# Patient Record
Sex: MALE | Race: WHITE | Employment: FULL TIME | URBAN - METROPOLITAN AREA
[De-identification: names, ages, dates, MRNs, and addresses within clinical notes are randomized per-mention and may not be internally consistent; named-entity substitution may affect disease eponyms.]

---

## 2017-01-09 ENCOUNTER — OFFICE VISIT (OUTPATIENT)
Dept: FAMILY MEDICINE CLINIC | Facility: CLINIC | Age: 52
End: 2017-01-09

## 2017-01-09 ENCOUNTER — HOSPITAL ENCOUNTER (OUTPATIENT)
Dept: CT IMAGING | Facility: HOSPITAL | Age: 52
Discharge: HOME | End: 2017-01-09
Attending: INTERNAL MEDICINE

## 2017-01-09 VITALS
WEIGHT: 286.25 LBS | HEIGHT: 72 IN | OXYGEN SATURATION: 98 % | RESPIRATION RATE: 16 BRPM | HEART RATE: 87 BPM | DIASTOLIC BLOOD PRESSURE: 90 MMHG | SYSTOLIC BLOOD PRESSURE: 122 MMHG | TEMPERATURE: 97 F | BODY MASS INDEX: 38.77 KG/M2

## 2017-01-09 DIAGNOSIS — R07.9 CHEST PAIN, UNSPECIFIED TYPE: ICD-10-CM

## 2017-01-09 DIAGNOSIS — E66.09 NON MORBID OBESITY DUE TO EXCESS CALORIES: ICD-10-CM

## 2017-01-09 DIAGNOSIS — I10 ESSENTIAL HYPERTENSION: Primary | ICD-10-CM

## 2017-01-09 DIAGNOSIS — Z13.9 SCREENING: ICD-10-CM

## 2017-01-09 PROCEDURE — 99214 OFFICE O/P EST MOD 30 MIN: CPT | Performed by: FAMILY MEDICINE

## 2017-01-09 RX ORDER — LISINOPRIL 40 MG/1
40 TABLET ORAL DAILY
Qty: 30 TABLET | Refills: 3 | Status: SHIPPED | OUTPATIENT
Start: 2017-01-09 | End: 2017-02-08 | Stop reason: WASHOUT

## 2017-01-09 NOTE — PROGRESS NOTES
Jacek Ross is a 54year old male. HPI:   Joesph Figueredo is here for follow up after he was hosptialized for chest pain last month, had TCM appt then here for recheck on condition and BP, his BP at work emily been 604-092 and diastolic  99-98.  He feels better a clubbing or edema    ASSESSMENT AND PLAN:   Essential hypertension  (primary encounter diagnosis)  Non morbid obesity due to excess calories  Chest pain, unspecified type    Can double up  The lisinopril to 40 mg daily, and RTC in 1 month to rechceck BP ca

## 2017-01-11 ENCOUNTER — TELEPHONE (OUTPATIENT)
Dept: FAMILY MEDICINE CLINIC | Facility: CLINIC | Age: 52
End: 2017-01-11

## 2017-01-11 NOTE — TELEPHONE ENCOUNTER
CT Heart Score received via fax from [facility]. Written order from Dr. J Luis Gramajo: Can notify negative, which is great. Pt notified by phone and verbalized understanding.

## 2017-02-06 ENCOUNTER — OFFICE VISIT (OUTPATIENT)
Dept: FAMILY MEDICINE CLINIC | Facility: CLINIC | Age: 52
End: 2017-02-06

## 2017-02-06 VITALS
RESPIRATION RATE: 16 BRPM | WEIGHT: 287 LBS | HEART RATE: 88 BPM | BODY MASS INDEX: 39 KG/M2 | SYSTOLIC BLOOD PRESSURE: 120 MMHG | DIASTOLIC BLOOD PRESSURE: 79 MMHG | TEMPERATURE: 97 F

## 2017-02-06 DIAGNOSIS — J30.89 SEASONAL ALLERGIC RHINITIS DUE TO OTHER ALLERGIC TRIGGER: ICD-10-CM

## 2017-02-06 DIAGNOSIS — I10 ESSENTIAL HYPERTENSION: Primary | ICD-10-CM

## 2017-02-06 DIAGNOSIS — E66.9 OBESITY (BMI 35.0-39.9 WITHOUT COMORBIDITY): ICD-10-CM

## 2017-02-06 DIAGNOSIS — Z77.090 ASBESTOS EXPOSURE: ICD-10-CM

## 2017-02-06 PROCEDURE — 99214 OFFICE O/P EST MOD 30 MIN: CPT | Performed by: FAMILY MEDICINE

## 2017-02-06 NOTE — PROGRESS NOTES
Thompson Harvey is a 54year old male. HPI:   Patient presents for recheck of his hypertension. Pt has been taking medications as instructed, no medication side effects, home BP monitoring in the range of 132-509'O systolic and 42-45'G diastolic.     Wt Alcohol Use: Yes           0.0 oz/week       0 Standard drinks or equivalent per week       Comment: rare        REVIEW OF SYSTEMS:   GENERAL HEALTH: feels well otherwise  SKIN: denies any unusual skin lesions or rashes  RESPIRATORY: d

## 2017-05-08 ENCOUNTER — OFFICE VISIT (OUTPATIENT)
Dept: FAMILY MEDICINE CLINIC | Facility: CLINIC | Age: 52
End: 2017-05-08

## 2017-05-08 VITALS
SYSTOLIC BLOOD PRESSURE: 130 MMHG | DIASTOLIC BLOOD PRESSURE: 90 MMHG | RESPIRATION RATE: 16 BRPM | HEART RATE: 96 BPM | BODY MASS INDEX: 39 KG/M2 | TEMPERATURE: 98 F | WEIGHT: 288.63 LBS

## 2017-05-08 DIAGNOSIS — J30.89 SEASONAL ALLERGIC RHINITIS DUE TO OTHER ALLERGIC TRIGGER: ICD-10-CM

## 2017-05-08 DIAGNOSIS — I10 ESSENTIAL HYPERTENSION: Primary | ICD-10-CM

## 2017-05-08 DIAGNOSIS — Z00.00 ROUTINE HEALTH MAINTENANCE: ICD-10-CM

## 2017-05-08 DIAGNOSIS — E66.9 OBESITY (BMI 35.0-39.9 WITHOUT COMORBIDITY): ICD-10-CM

## 2017-05-08 PROCEDURE — 99214 OFFICE O/P EST MOD 30 MIN: CPT | Performed by: FAMILY MEDICINE

## 2017-05-08 RX ORDER — LISINOPRIL 40 MG/1
TABLET ORAL
Refills: 3 | COMMUNITY
Start: 2017-04-12 | End: 2017-05-08

## 2017-05-08 RX ORDER — LISINOPRIL 40 MG/1
TABLET ORAL
Qty: 90 TABLET | Refills: 3 | Status: SHIPPED | OUTPATIENT
Start: 2017-05-08 | End: 2018-05-05

## 2017-05-08 NOTE — PROGRESS NOTES
Anibal Mcdermott is a 50year old male. HPI:   Patient presents for recheck of his hypertension.  Pt has been taking medications as instructed, no medication side effects, home BP monitoring in the range of 437-618'V systolic and 76-49'I diastolic.he den Disp: 23 g Rfl: 0   Albuterol Sulfate HFA (PROAIR HFA) 108 (90 BASE) MCG/ACT Inhalation Aero Soln Inhale 2 puffs into the lungs every 6 (six) hours as needed for Wheezing.  Disp: 1 Inhaler Rfl: 0      Past Medical History   Diagnosis Date   • Kidney stones

## 2017-05-10 RX ORDER — LISINOPRIL 40 MG/1
TABLET ORAL
Qty: 30 TABLET | Refills: 3 | OUTPATIENT
Start: 2017-05-10

## 2017-11-08 ENCOUNTER — OFFICE VISIT (OUTPATIENT)
Dept: FAMILY MEDICINE CLINIC | Facility: CLINIC | Age: 52
End: 2017-11-08

## 2017-11-08 VITALS
HEART RATE: 70 BPM | TEMPERATURE: 98 F | SYSTOLIC BLOOD PRESSURE: 138 MMHG | DIASTOLIC BLOOD PRESSURE: 82 MMHG | OXYGEN SATURATION: 98 %

## 2017-11-08 DIAGNOSIS — J01.10 ACUTE NON-RECURRENT FRONTAL SINUSITIS: Primary | ICD-10-CM

## 2017-11-08 PROCEDURE — 99213 OFFICE O/P EST LOW 20 MIN: CPT | Performed by: PHYSICIAN ASSISTANT

## 2017-11-08 RX ORDER — AMOXICILLIN AND CLAVULANATE POTASSIUM 875; 125 MG/1; MG/1
1 TABLET, FILM COATED ORAL 2 TIMES DAILY
Qty: 20 TABLET | Refills: 0 | Status: SHIPPED | OUTPATIENT
Start: 2017-11-08 | End: 2017-11-18

## 2017-11-08 NOTE — PATIENT INSTRUCTIONS
1. Augmentin 875 mg twice daily for 10 days. 2.  Continue Flonase.     3.  Encourage fluids, humidifier/vaporizor at bedside, elevate head of bed (sleep with extra pillow), vapor rub to chest, steam therapy if no fever, warm compresses for sinus pressure · Over-the-counter decongestants may be used unless a similar medicine was prescribed. Nasal sprays work the fastest. Use one that contains phenylephrine or oxymetazoline. First blow the nose gently. Then use the spray.  Do not use these medicines more ofte © 0388-3736 [employer] [address] [facility], [facility] [place]. All rights reserved. This information is not intended as a substitute for professional medical care. Always follow your healthcare professional's instructions.

## 2017-11-08 NOTE — PROGRESS NOTES
CHIEF COMPLAINT:   No chief complaint on file. HPI:   Pk Mate is a 50year old male who presents for cold symptoms for  2  weeks. Symptoms have progressed into sinus congestion and been worsening since onset.  Sinus congestion/pain is described SKIN: no rashes or abnormal skin lesions  HEENT: See HPI. LUNGS: denies shortness of breath or wheezing, See HPI  CARDIOVASCULAR: denies chest pain or palpitations   GI: denies N/V/C or abdominal pain  NEURO: + sinus headaches.   No numbness or tingling 3.  Encourage fluids, humidifier/vaporizor at bedside, elevate head of bed (sleep with extra pillow), vapor rub to chest, steam therapy if no fever, warm compresses for sinus pressure if no fever, salt water gargles for sore throat, lozenges for sore throa · Over-the-counter decongestants may be used unless a similar medicine was prescribed. Nasal sprays work the fastest. Use one that contains phenylephrine or oxymetazoline. First blow the nose gently. Then use the spray.  Do not use these medicines more ofte © 2733-8951 [employer] [address]. [address] [facility], [facility] [place]. All rights reserved. This information is not intended as a substitute for professional medical care. Always follow your healthcare professional's instructions.             The

## 2018-02-11 ENCOUNTER — HOSPITAL ENCOUNTER (EMERGENCY)
Facility: HOSPITAL | Age: 53
Discharge: HOME | End: 2018-02-11
Attending: EMERGENCY MEDICINE
Payer: COMMERCIAL

## 2018-02-11 ENCOUNTER — APPOINTMENT (OUTPATIENT)
Dept: CT IMAGING | Facility: HOSPITAL | Age: 53
End: 2018-02-11
Attending: EMERGENCY MEDICINE
Payer: COMMERCIAL

## 2018-02-11 VITALS
RESPIRATION RATE: 14 BRPM | SYSTOLIC BLOOD PRESSURE: 127 MMHG | DIASTOLIC BLOOD PRESSURE: 76 MMHG | HEART RATE: 93 BPM | WEIGHT: 280 LBS | HEIGHT: 72 IN | OXYGEN SATURATION: 98 % | BODY MASS INDEX: 37.93 KG/M2 | TEMPERATURE: 99 F

## 2018-02-11 DIAGNOSIS — S76.211A GROIN STRAIN, RIGHT, INITIAL ENCOUNTER: Primary | ICD-10-CM

## 2018-02-11 LAB
ALBUMIN SERPL-MCNC: 3.7 G/DL (ref 3.5–4.8)
ALP LIVER SERPL-CCNC: 60 U/L (ref 45–117)
ALT SERPL-CCNC: 37 U/L (ref 17–63)
AST SERPL-CCNC: 23 U/L (ref 15–41)
BASOPHILS # BLD AUTO: 0.02 X10(3) UL (ref 0–0.1)
BASOPHILS NFR BLD AUTO: 0.3 %
BILIRUB SERPL-MCNC: 0.5 MG/DL (ref 0.1–2)
BILIRUB UR QL STRIP.AUTO: NEGATIVE
BUN BLD-MCNC: 19 MG/DL (ref 8–20)
CALCIUM BLD-MCNC: 9.4 MG/DL (ref 8.3–10.3)
CHLORIDE: 107 MMOL/L (ref 101–111)
CLARITY UR REFRACT.AUTO: CLEAR
CO2: 23 MMOL/L (ref 22–32)
COLOR UR AUTO: YELLOW
CREAT BLD-MCNC: 0.88 MG/DL (ref 0.7–1.3)
EOSINOPHIL # BLD AUTO: 0.09 X10(3) UL (ref 0–0.3)
EOSINOPHIL NFR BLD AUTO: 1.5 %
ERYTHROCYTE [DISTWIDTH] IN BLOOD BY AUTOMATED COUNT: 12.1 % (ref 11.5–16)
GLUCOSE BLD-MCNC: 110 MG/DL (ref 70–99)
GLUCOSE UR STRIP.AUTO-MCNC: NEGATIVE MG/DL
HCT VFR BLD AUTO: 44.1 % (ref 37–53)
HGB BLD-MCNC: 14.7 G/DL (ref 13–17)
IMMATURE GRANULOCYTE COUNT: 0.01 X10(3) UL (ref 0–1)
IMMATURE GRANULOCYTE RATIO %: 0.2 %
KETONES UR STRIP.AUTO-MCNC: NEGATIVE MG/DL
LEUKOCYTE ESTERASE UR QL STRIP.AUTO: NEGATIVE
LYMPHOCYTES # BLD AUTO: 1.87 X10(3) UL (ref 0.9–4)
LYMPHOCYTES NFR BLD AUTO: 30.3 %
M PROTEIN MFR SERPL ELPH: 7.4 G/DL (ref 6.1–8.3)
MCH RBC QN AUTO: 30.2 PG (ref 27–33.2)
MCHC RBC AUTO-ENTMCNC: 33.3 G/DL (ref 31–37)
MCV RBC AUTO: 90.7 FL (ref 80–99)
MONOCYTES # BLD AUTO: 0.46 X10(3) UL (ref 0.1–1)
MONOCYTES NFR BLD AUTO: 7.4 %
NEUTROPHIL ABS PRELIM: 3.73 X10 (3) UL (ref 1.3–6.7)
NEUTROPHILS # BLD AUTO: 3.73 X10(3) UL (ref 1.3–6.7)
NEUTROPHILS NFR BLD AUTO: 60.3 %
NITRITE UR QL STRIP.AUTO: NEGATIVE
PH UR STRIP.AUTO: 5 [PH] (ref 4.5–8)
PLATELET # BLD AUTO: 221 10(3)UL (ref 150–450)
POTASSIUM SERPL-SCNC: 4.1 MMOL/L (ref 3.6–5.1)
PROT UR STRIP.AUTO-MCNC: NEGATIVE MG/DL
RBC # BLD AUTO: 4.86 X10(6)UL (ref 4.3–5.7)
RBC UR QL AUTO: NEGATIVE
RED CELL DISTRIBUTION WIDTH-SD: 40.3 FL (ref 35.1–46.3)
SODIUM SERPL-SCNC: 138 MMOL/L (ref 136–144)
SP GR UR STRIP.AUTO: 1.02 (ref 1–1.03)
UROBILINOGEN UR STRIP.AUTO-MCNC: <2 MG/DL
WBC # BLD AUTO: 6.2 X10(3) UL (ref 4–13)

## 2018-02-11 PROCEDURE — 99284 EMERGENCY DEPT VISIT MOD MDM: CPT

## 2018-02-11 PROCEDURE — 36415 COLL VENOUS BLD VENIPUNCTURE: CPT

## 2018-02-11 PROCEDURE — 80053 COMPREHEN METABOLIC PANEL: CPT | Performed by: EMERGENCY MEDICINE

## 2018-02-11 PROCEDURE — 81003 URINALYSIS AUTO W/O SCOPE: CPT | Performed by: EMERGENCY MEDICINE

## 2018-02-11 PROCEDURE — 85025 COMPLETE CBC W/AUTO DIFF WBC: CPT | Performed by: EMERGENCY MEDICINE

## 2018-02-11 PROCEDURE — 74176 CT ABD & PELVIS W/O CONTRAST: CPT | Performed by: EMERGENCY MEDICINE

## 2018-02-11 NOTE — ED PROVIDER NOTES
Patient Seen in: [facility] Emergency Department    History   Patient presents with:  Abdomen/Flank Pain (GI/)    Stated Complaint: Abd pain     HPI    31-year-old male complaining of right lower quadrant abdominal pain the patient states this sta exam shows regular rate and rhythm without murmurs.   Abdomen soft with moderate right lower quadrant tenderness as slightly below McBurney's point just above the pubic bone there is no palpable masses no rebound the scrotum testicle penis appear normal is

## 2018-02-11 NOTE — ED INITIAL ASSESSMENT (HPI)
Pt here with R groin and low abdominal pain radiating around to back, +nausea and dizzy, pt reports pain after shoveling snow yesterday.  Hx kidney stones

## 2018-03-05 ENCOUNTER — OFFICE VISIT (OUTPATIENT)
Dept: FAMILY MEDICINE CLINIC | Facility: CLINIC | Age: 53
End: 2018-03-05

## 2018-03-05 ENCOUNTER — HOSPITAL ENCOUNTER (OUTPATIENT)
Dept: GENERAL RADIOLOGY | Age: 53
Discharge: HOME | End: 2018-03-05
Attending: FAMILY MEDICINE
Payer: COMMERCIAL

## 2018-03-05 VITALS
TEMPERATURE: 98 F | DIASTOLIC BLOOD PRESSURE: 84 MMHG | RESPIRATION RATE: 16 BRPM | WEIGHT: 289.81 LBS | HEIGHT: 72.5 IN | SYSTOLIC BLOOD PRESSURE: 128 MMHG | HEART RATE: 94 BPM | BODY MASS INDEX: 38.83 KG/M2

## 2018-03-05 DIAGNOSIS — M54.42 BILATERAL LOW BACK PAIN WITH BILATERAL SCIATICA, UNSPECIFIED CHRONICITY: Primary | ICD-10-CM

## 2018-03-05 DIAGNOSIS — R20.2 PARESTHESIA OF BOTH LOWER EXTREMITIES: ICD-10-CM

## 2018-03-05 DIAGNOSIS — J30.89 SEASONAL ALLERGIC RHINITIS DUE TO OTHER ALLERGIC TRIGGER: ICD-10-CM

## 2018-03-05 DIAGNOSIS — M54.42 BILATERAL LOW BACK PAIN WITH BILATERAL SCIATICA, UNSPECIFIED CHRONICITY: ICD-10-CM

## 2018-03-05 DIAGNOSIS — M54.41 BILATERAL LOW BACK PAIN WITH BILATERAL SCIATICA, UNSPECIFIED CHRONICITY: Primary | ICD-10-CM

## 2018-03-05 DIAGNOSIS — M54.41 BILATERAL LOW BACK PAIN WITH BILATERAL SCIATICA, UNSPECIFIED CHRONICITY: ICD-10-CM

## 2018-03-05 PROCEDURE — 72110 X-RAY EXAM L-2 SPINE 4/>VWS: CPT | Performed by: FAMILY MEDICINE

## 2018-03-05 PROCEDURE — 99214 OFFICE O/P EST MOD 30 MIN: CPT | Performed by: FAMILY MEDICINE

## 2018-03-05 RX ORDER — METAXALONE 800 MG/1
TABLET ORAL
Qty: 15 TABLET | Refills: 0 | Status: SHIPPED | OUTPATIENT
Start: 2018-03-05 | End: 2018-04-04

## 2018-03-05 RX ORDER — PREDNISONE 10 MG/1
TABLET ORAL
Qty: 18 TABLET | Refills: 0 | Status: SHIPPED | OUTPATIENT
Start: 2018-03-05 | End: 2018-04-04

## 2018-03-05 NOTE — PROGRESS NOTES
Ninoska Tay is a 48year old male.   HPI:   Davin Hardwick is here for probems with his lower back, that he initially developed several months ago,  He cannot recall the mechanism of injury, he was not able to work due to the [employer] [address] , and fortunately he did not hav denies any unusual skin lesions or rashes  RESPIRATORY: denies shortness of breath with exertion  CARDIOVASCULAR: denies chest pain on exertion  GI: denies abdominal pain and denies heartburn  NEURO: denies headaches, paresthesia in both LE, low back pain

## 2018-03-07 ENCOUNTER — TELEPHONE (OUTPATIENT)
Dept: FAMILY MEDICINE CLINIC | Facility: CLINIC | Age: 53
End: 2018-03-07

## 2018-03-07 RX ORDER — LORAZEPAM 0.5 MG/1
TABLET ORAL
Qty: 2 TABLET | Refills: 0 | Status: SHIPPED | OUTPATIENT
Start: 2018-03-07 | End: 2018-04-04

## 2018-03-07 NOTE — TELEPHONE ENCOUNTER
Well I can;t make him unconscious, but I can call something in that might relax him for the test, in which case someone should drive him

## 2018-03-07 NOTE — TELEPHONE ENCOUNTER
Pt is having an MRI on Monday and is wondering if he can get something to sedate him.    Please return call to [phone]

## 2018-03-09 ENCOUNTER — OFFICE VISIT (OUTPATIENT)
Dept: FAMILY MEDICINE CLINIC | Facility: CLINIC | Age: 53
End: 2018-03-09

## 2018-03-09 VITALS
DIASTOLIC BLOOD PRESSURE: 86 MMHG | RESPIRATION RATE: 14 BRPM | TEMPERATURE: 99 F | BODY MASS INDEX: 39 KG/M2 | WEIGHT: 291 LBS | HEART RATE: 96 BPM | OXYGEN SATURATION: 98 % | SYSTOLIC BLOOD PRESSURE: 142 MMHG

## 2018-03-09 DIAGNOSIS — R20.2 PARESTHESIA OF BOTH LOWER EXTREMITIES: ICD-10-CM

## 2018-03-09 DIAGNOSIS — N45.1 EPIDIDYMITIS: Primary | ICD-10-CM

## 2018-03-09 DIAGNOSIS — M54.42 ACUTE BILATERAL LOW BACK PAIN WITH BILATERAL SCIATICA: ICD-10-CM

## 2018-03-09 DIAGNOSIS — M54.41 ACUTE BILATERAL LOW BACK PAIN WITH BILATERAL SCIATICA: ICD-10-CM

## 2018-03-09 DIAGNOSIS — M54.16 LUMBAR RADICULOPATHY: ICD-10-CM

## 2018-03-09 PROCEDURE — 99214 OFFICE O/P EST MOD 30 MIN: CPT | Performed by: FAMILY MEDICINE

## 2018-03-09 RX ORDER — SULFAMETHOXAZOLE AND TRIMETHOPRIM 800; 160 MG/1; MG/1
1 TABLET ORAL 2 TIMES DAILY
Qty: 20 TABLET | Refills: 0 | Status: SHIPPED | OUTPATIENT
Start: 2018-03-09 | End: 2018-04-04

## 2018-03-09 RX ORDER — TRAMADOL HYDROCHLORIDE 50 MG/1
50 TABLET ORAL EVERY 6 HOURS PRN
Qty: 30 TABLET | Refills: 0 | Status: SHIPPED | OUTPATIENT
Start: 2018-03-09 | End: 2018-04-04

## 2018-03-09 NOTE — PROGRESS NOTES
Anibal Mcdermott is a 48year old male.   HPI:   Waqas Hancock is here for probems with his lower back, that he initially developed several months ago,  He cannot recall the mechanism of injury, he was not able to work due to the [employer] , and fortunately he did not hav Comment: rare       REVIEW OF SYSTEMS:   GENERAL HEALTH: feels well otherwise  SKIN: denies any unusual skin lesions or rashes  RESPIRATORY: denies shortness of breath with exertion  CARDIOVASCULAR: denies chest pain on exertion  GI: denies abdominal pain

## 2018-03-12 ENCOUNTER — HOSPITAL ENCOUNTER (OUTPATIENT)
Dept: MRI IMAGING | Facility: HOSPITAL | Age: 53
Discharge: HOME | End: 2018-03-12
Attending: FAMILY MEDICINE
Payer: COMMERCIAL

## 2018-03-12 DIAGNOSIS — R20.2 PARESTHESIA OF BOTH LOWER EXTREMITIES: ICD-10-CM

## 2018-03-12 DIAGNOSIS — M54.41 BILATERAL LOW BACK PAIN WITH BILATERAL SCIATICA, UNSPECIFIED CHRONICITY: ICD-10-CM

## 2018-03-12 DIAGNOSIS — M54.42 BILATERAL LOW BACK PAIN WITH BILATERAL SCIATICA, UNSPECIFIED CHRONICITY: ICD-10-CM

## 2018-03-12 PROCEDURE — 72148 MRI LUMBAR SPINE W/O DYE: CPT | Performed by: FAMILY MEDICINE

## 2018-03-13 ENCOUNTER — TELEPHONE (OUTPATIENT)
Dept: FAMILY MEDICINE CLINIC | Facility: CLINIC | Age: 53
End: 2018-03-13

## 2018-03-13 DIAGNOSIS — R20.2 PARESTHESIA OF BOTH LOWER EXTREMITIES: ICD-10-CM

## 2018-03-13 DIAGNOSIS — M54.42 ACUTE BILATERAL LOW BACK PAIN WITH BILATERAL SCIATICA: Primary | ICD-10-CM

## 2018-03-13 DIAGNOSIS — M54.41 ACUTE BILATERAL LOW BACK PAIN WITH BILATERAL SCIATICA: Primary | ICD-10-CM

## 2018-03-13 NOTE — TELEPHONE ENCOUNTER
----- Message from Liz Conklin DO sent at 3/13/2018 10:14 AM CDT -----  Can notify Chuy Colunga that his MRI shows some wear and tear changes, but no big disc herniation or compression of the spinal cord, I think he might benefit from some PT to help with his b

## 2018-03-13 NOTE — TELEPHONE ENCOUNTER
Pt advised of results- verbalized understanding. Pt stated he has PT through his work- he would just need a copy of the order. He requested that we mail it to him.   I advised mail does have to go through the hospital and can take over a week- pt was okay

## 2018-03-15 ENCOUNTER — TELEPHONE (OUTPATIENT)
Dept: FAMILY MEDICINE CLINIC | Facility: CLINIC | Age: 53
End: 2018-03-15

## 2018-03-15 DIAGNOSIS — N50.0 ATROPHIC TESTICLE: Primary | ICD-10-CM

## 2018-03-15 NOTE — TELEPHONE ENCOUNTER
Per Dr. Gerson Chris pt is to see Dr. Yari Chavez [phone]    Referral placed and authorized.     Tried to call pt at number provided and received busy signal- will try back

## 2018-03-26 ENCOUNTER — TELEPHONE (OUTPATIENT)
Dept: FAMILY MEDICINE CLINIC | Facility: CLINIC | Age: 53
End: 2018-03-26

## 2018-03-26 ENCOUNTER — MED REC SCAN ONLY (OUTPATIENT)
Dept: FAMILY MEDICINE CLINIC | Facility: CLINIC | Age: 53
End: 2018-03-26

## 2018-03-26 DIAGNOSIS — Z12.5 PROSTATE CANCER SCREENING: ICD-10-CM

## 2018-03-26 DIAGNOSIS — I10 ESSENTIAL HYPERTENSION: Primary | ICD-10-CM

## 2018-03-26 DIAGNOSIS — Z13.220 LIPID SCREENING: ICD-10-CM

## 2018-03-26 NOTE — TELEPHONE ENCOUNTER
Pt requesting labs for upcoming apptment for physical exam DOS 4/4/17. Pt. Would like to review labs at his physical apptment. Although pt is new to [facility], he is an established pt who has [facility] insurance and changing PCP to Dr. Coty De Leon.   Pt is requesting an

## 2018-03-28 ENCOUNTER — APPOINTMENT (OUTPATIENT)
Dept: LAB | Age: 53
End: 2018-03-28
Attending: FAMILY MEDICINE
Payer: COMMERCIAL

## 2018-03-28 DIAGNOSIS — Z13.220 LIPID SCREENING: ICD-10-CM

## 2018-03-28 DIAGNOSIS — I10 ESSENTIAL HYPERTENSION: ICD-10-CM

## 2018-03-28 DIAGNOSIS — Z12.5 PROSTATE CANCER SCREENING: ICD-10-CM

## 2018-03-28 PROCEDURE — 80053 COMPREHEN METABOLIC PANEL: CPT

## 2018-03-28 PROCEDURE — 36415 COLL VENOUS BLD VENIPUNCTURE: CPT

## 2018-03-28 PROCEDURE — 80061 LIPID PANEL: CPT

## 2018-04-04 ENCOUNTER — OFFICE VISIT (OUTPATIENT)
Dept: FAMILY MEDICINE CLINIC | Facility: CLINIC | Age: 53
End: 2018-04-04

## 2018-04-04 VITALS
WEIGHT: 290 LBS | HEIGHT: 72 IN | TEMPERATURE: 98 F | SYSTOLIC BLOOD PRESSURE: 136 MMHG | RESPIRATION RATE: 16 BRPM | DIASTOLIC BLOOD PRESSURE: 84 MMHG | HEART RATE: 80 BPM | BODY MASS INDEX: 39.28 KG/M2

## 2018-04-04 DIAGNOSIS — M54.42 ACUTE BILATERAL LOW BACK PAIN WITH BILATERAL SCIATICA: ICD-10-CM

## 2018-04-04 DIAGNOSIS — I10 ESSENTIAL HYPERTENSION: ICD-10-CM

## 2018-04-04 DIAGNOSIS — Z00.00 ANNUAL PHYSICAL EXAM: Primary | ICD-10-CM

## 2018-04-04 DIAGNOSIS — M54.41 ACUTE BILATERAL LOW BACK PAIN WITH BILATERAL SCIATICA: ICD-10-CM

## 2018-04-04 DIAGNOSIS — M25.551 RIGHT HIP PAIN: ICD-10-CM

## 2018-04-04 DIAGNOSIS — N50.0 ATROPHIC TESTICLE: ICD-10-CM

## 2018-04-04 DIAGNOSIS — N20.0 KIDNEY STONES: ICD-10-CM

## 2018-04-04 PROBLEM — R20.2 PARESTHESIA OF BOTH LOWER EXTREMITIES: Status: RESOLVED | Noted: 2018-03-09 | Resolved: 2018-04-04

## 2018-04-04 PROCEDURE — 99396 PREV VISIT EST AGE 40-64: CPT | Performed by: FAMILY MEDICINE

## 2018-04-04 NOTE — PROGRESS NOTES
Patient presents with:  Establish Care  Physical     HPI:   Sharad Guzman is a 48year old male who presents for a complete physical exam. He is a new patient to this office. Was seeing Анна Terry, but recently moved to [place].      Last colonoscopy:  Was CREATSERUM 0.91 03/28/2018 03:26 PM   CA 9.1 03/28/2018 03:26 PM   ALB 4.0 03/28/2018 03:26 PM   TP 7.6 03/28/2018 03:26 PM   ALKPHO 54 03/28/2018 03:26 PM   AST 29 03/28/2018 03:26 PM   ALT 43 03/28/2018 03:26 PM   BILT 0.8 03/28/2018 03:26 PM both ears  Nose: Nares normal. Septum midline. Mucosa normal. No drainage or sinus tenderness.   Throat: lips, mucosa, and tongue normal; teeth and gums normal  Neck: no adenopathy, no JVD, supple, symmetrical, trachea midline and thyroid not enlarged, symm hydrated  Not interested in Flomax due to SE  If pains, will refer to urology. RTC annually.

## 2018-04-09 ENCOUNTER — HOSPITAL ENCOUNTER (OUTPATIENT)
Dept: GENERAL RADIOLOGY | Age: 53
Discharge: HOME | End: 2018-04-09
Attending: FAMILY MEDICINE
Payer: COMMERCIAL

## 2018-04-09 DIAGNOSIS — M25.551 RIGHT HIP PAIN: ICD-10-CM

## 2018-04-09 PROCEDURE — 73502 X-RAY EXAM HIP UNI 2-3 VIEWS: CPT | Performed by: FAMILY MEDICINE

## 2018-05-05 RX ORDER — LISINOPRIL 40 MG/1
TABLET ORAL
Qty: 90 TABLET | Refills: 3 | Status: SHIPPED | OUTPATIENT
Start: 2018-05-05 | End: 2018-06-21

## 2018-05-05 NOTE — TELEPHONE ENCOUNTER
Last refill: 5- #90 with 3 refills  Last Visit: 3-  Next Visit: No future appointments. Forward to Dr. Wellington Cuadra please advise on refills. Pt's PCP is different, is this still your pt Dr. Wellington Cuadra? Thanks.

## 2018-06-21 ENCOUNTER — APPOINTMENT (OUTPATIENT)
Dept: ULTRASOUND IMAGING | Facility: HOSPITAL | Age: 53
End: 2018-06-21
Attending: EMERGENCY MEDICINE
Payer: COMMERCIAL

## 2018-06-21 ENCOUNTER — APPOINTMENT (OUTPATIENT)
Dept: GENERAL RADIOLOGY | Facility: HOSPITAL | Age: 53
End: 2018-06-21
Payer: COMMERCIAL

## 2018-06-21 ENCOUNTER — APPOINTMENT (OUTPATIENT)
Dept: CV DIAGNOSTICS | Facility: HOSPITAL | Age: 53
End: 2018-06-21
Attending: HOSPITALIST
Payer: COMMERCIAL

## 2018-06-21 ENCOUNTER — HOSPITAL ENCOUNTER (OUTPATIENT)
Facility: HOSPITAL | Age: 53
Setting detail: OBSERVATION
Discharge: HOME | End: 2018-06-22
Attending: EMERGENCY MEDICINE | Admitting: HOSPITALIST
Payer: COMMERCIAL

## 2018-06-21 DIAGNOSIS — R07.89 CHEST PAIN, ATYPICAL: Primary | ICD-10-CM

## 2018-06-21 PROCEDURE — 71045 X-RAY EXAM CHEST 1 VIEW: CPT | Performed by: EMERGENCY MEDICINE

## 2018-06-21 PROCEDURE — 99219 INITIAL OBSERVATION CARE,LEVL II: CPT | Performed by: HOSPITALIST

## 2018-06-21 PROCEDURE — 93306 TTE W/DOPPLER COMPLETE: CPT | Performed by: HOSPITALIST

## 2018-06-21 PROCEDURE — 76700 US EXAM ABDOM COMPLETE: CPT | Performed by: EMERGENCY MEDICINE

## 2018-06-21 RX ORDER — ASPIRIN 325 MG
325 TABLET ORAL DAILY
Status: DISCONTINUED | OUTPATIENT
Start: 2018-06-21 | End: 2018-06-22

## 2018-06-21 RX ORDER — PRAVASTATIN SODIUM 10 MG
10 TABLET ORAL NIGHTLY
Status: DISCONTINUED | OUTPATIENT
Start: 2018-06-21 | End: 2018-06-22

## 2018-06-21 RX ORDER — FEXOFENADINE HCL 180 MG/1
180 TABLET ORAL DAILY
COMMUNITY
End: 2018-08-20 | Stop reason: ALTCHOICE

## 2018-06-21 RX ORDER — TRIAMCINOLONE ACETONIDE 55 UG/1
2 SPRAY, METERED NASAL DAILY
COMMUNITY
End: 2020-08-26

## 2018-06-21 RX ORDER — ASPIRIN 81 MG/1
324 TABLET, CHEWABLE ORAL ONCE
Status: COMPLETED | OUTPATIENT
Start: 2018-06-21 | End: 2018-06-21

## 2018-06-21 RX ORDER — NITROGLYCERIN 0.4 MG/1
0.4 TABLET SUBLINGUAL EVERY 5 MIN PRN
Status: DISCONTINUED | OUTPATIENT
Start: 2018-06-21 | End: 2018-06-22

## 2018-06-21 RX ORDER — LISINOPRIL 40 MG/1
40 TABLET ORAL DAILY
Status: ON HOLD | COMMUNITY
End: 2018-06-22

## 2018-06-21 RX ORDER — SODIUM CHLORIDE 9 MG/ML
INJECTION, SOLUTION INTRAVENOUS CONTINUOUS
Status: ACTIVE | OUTPATIENT
Start: 2018-06-21 | End: 2018-06-21

## 2018-06-21 RX ORDER — LISINOPRIL 40 MG/1
40 TABLET ORAL DAILY
Status: DISCONTINUED | OUTPATIENT
Start: 2018-06-21 | End: 2018-06-22

## 2018-06-21 RX ORDER — HEPARIN SODIUM 5000 [USP'U]/ML
5000 INJECTION, SOLUTION INTRAVENOUS; SUBCUTANEOUS EVERY 8 HOURS SCHEDULED
Status: DISCONTINUED | OUTPATIENT
Start: 2018-06-21 | End: 2018-06-22

## 2018-06-21 RX ORDER — ACETAMINOPHEN 325 MG/1
650 TABLET ORAL EVERY 6 HOURS PRN
Status: DISCONTINUED | OUTPATIENT
Start: 2018-06-21 | End: 2018-06-22

## 2018-06-21 RX ORDER — ASPIRIN 81 MG/1
324 TABLET, CHEWABLE ORAL ONCE
Status: DISCONTINUED | OUTPATIENT
Start: 2018-06-21 | End: 2018-06-21

## 2018-06-21 RX ORDER — ONDANSETRON 2 MG/ML
4 INJECTION INTRAMUSCULAR; INTRAVENOUS EVERY 4 HOURS PRN
Status: DISCONTINUED | OUTPATIENT
Start: 2018-06-21 | End: 2018-06-22

## 2018-06-21 RX ORDER — POTASSIUM CHLORIDE 20 MEQ/1
40 TABLET, EXTENDED RELEASE ORAL ONCE
Status: COMPLETED | OUTPATIENT
Start: 2018-06-21 | End: 2018-06-21

## 2018-06-21 NOTE — ED INITIAL ASSESSMENT (HPI)
Pt here for chest pain and abdomen discomfort since yesterday. States he feels dizzy when episode starts. Been having some coughing and some mild SOB with exertion this past 2 weeks. Denies leg pain, fever, denies any recent travel or long plane rides.  Pt

## 2018-06-21 NOTE — ED PROVIDER NOTES
Patient Seen in: [facility] Emergency Department    History   Patient presents with:  Chest Pain Angina (cardiovascular)    Stated Complaint: chest pain    HPI    Patient is a 53-year-old male presents emergency room with a history of intermittent u (Temporal)   Resp 18   Ht 182.9 cm (6')   Wt 129.3 kg   SpO2 95%   BMI 38.65 kg/m²         Physical Exam    GENERAL: Well-developed, well-nourished male sitting up breathing easily in no apparent distress. Patient is nontoxic in appearance.   HEENT: Head i Please view results for these tests on the individual orders. RAINBOW DRAW BLUE   RAINBOW DRAW LAVENDER   RAINBOW DRAW LIGHT GREEN   RAINBOW DRAW GOLD   CBC W/ DIFFERENTIAL     EKG    Rate, intervals and axes as noted on EKG Report.   Rate: noted above. Patient liver function tests, troponin, and lipase are normal.  Patient's coags are unremarkable. Patient was placed on a continuous pulse ox and cardiac monitor. Patient given aspirin here in the emergency room.   In light of symptoms of ch

## 2018-06-21 NOTE — H&P
[facility]  History and Physical     Tracy Olmedo Patient Status:  Emergency    1965 MRN [id number]   Location [facility] [facility] Attending Stephanie Cruz MD   Hosp Day # 0 PCP Go Nickerson MD     Chief Complain oriented x 3. HEENT: Normocephalic atraumatic. Moist mucous membranes. EOM-I. PERRLA. Anicteric. Neck: No lymphadenopathy. No JVD. No carotid bruits. Respiratory: Clear to auscultation bilaterally. No wheezes. No rhonchi.   Cardiovascular: S1, S2. Regula

## 2018-06-21 NOTE — PROGRESS NOTES
06/21/18 1531 06/21/18 1534 06/21/18 1535   Vital Signs   /85 129/80 121/88   BP Location Left arm Right arm Left arm   BP Method Automatic Automatic Automatic   Patient Position Lying Sitting Standing     Orthostatics negative

## 2018-06-21 NOTE — CONSULTS
[facility]  Cardiology Consultation    Tania Anne Patient Status:  Observation    1965 MRN [id number]   [facility] 2NE-A Attending Belén Henriquez MD   Hosp Day # 0 PCP Horace Uriostegui MD     Reason for Consultation:  Chest gayle (Porcine) 5000 UNIT/ML injection 5,000 Units, 5,000 Units, Subcutaneous, Q8H [facility]  •  acetaminophen (TYLENOL) tab 650 mg, 650 mg, Oral, Q6H PRN  •  Pravastatin Sodium (PRAVACHOL) tab 10 mg, 10 mg, Oral, Nightly    Review of Systems:  A comprehensive review 0.93 06/21/2018   PTP 12.8 06/21/2018    06/21/2018   TROP <0.046 06/21/2018       Imaging:  EKG normal sinus rhythm with PVC  Echo 2016 with normal LVEF, normal study  Stress echo two years ago negative for ischemia    Impression:  Principal Proble

## 2018-06-22 ENCOUNTER — APPOINTMENT (OUTPATIENT)
Dept: CV DIAGNOSTICS | Facility: HOSPITAL | Age: 53
End: 2018-06-22
Attending: NURSE PRACTITIONER
Payer: COMMERCIAL

## 2018-06-22 VITALS
SYSTOLIC BLOOD PRESSURE: 112 MMHG | WEIGHT: 285 LBS | OXYGEN SATURATION: 98 % | RESPIRATION RATE: 18 BRPM | BODY MASS INDEX: 38.6 KG/M2 | DIASTOLIC BLOOD PRESSURE: 84 MMHG | TEMPERATURE: 99 F | HEART RATE: 88 BPM | HEIGHT: 72 IN

## 2018-06-22 PROCEDURE — 93017 CV STRESS TEST TRACING ONLY: CPT | Performed by: NURSE PRACTITIONER

## 2018-06-22 PROCEDURE — 78452 HT MUSCLE IMAGE SPECT MULT: CPT | Performed by: NURSE PRACTITIONER

## 2018-06-22 PROCEDURE — 93018 CV STRESS TEST I&R ONLY: CPT | Performed by: NURSE PRACTITIONER

## 2018-06-22 RX ORDER — METOPROLOL SUCCINATE 25 MG/1
25 TABLET, EXTENDED RELEASE ORAL
Status: DISCONTINUED | OUTPATIENT
Start: 2018-06-23 | End: 2018-06-22

## 2018-06-22 RX ORDER — PRAVASTATIN SODIUM 10 MG
10 TABLET ORAL NIGHTLY
Qty: 30 TABLET | Refills: 3 | Status: SHIPPED | OUTPATIENT
Start: 2018-06-22 | End: 2018-06-22

## 2018-06-22 RX ORDER — LISINOPRIL 40 MG/1
20 TABLET ORAL DAILY
Qty: 30 TABLET | Refills: 0 | Status: SHIPPED | OUTPATIENT
Start: 2018-06-22 | End: 2018-10-24

## 2018-06-22 RX ORDER — MAGNESIUM OXIDE 400 MG (241.3 MG MAGNESIUM) TABLET
400 TABLET DAILY
Qty: 30 TABLET | Refills: 0 | Status: ON HOLD | OUTPATIENT
Start: 2018-06-22 | End: 2018-10-25

## 2018-06-22 RX ORDER — METOPROLOL SUCCINATE 25 MG/1
25 TABLET, EXTENDED RELEASE ORAL
Qty: 60 TABLET | Refills: 0 | Status: SHIPPED | OUTPATIENT
Start: 2018-06-23 | End: 2018-08-13

## 2018-06-22 RX ORDER — PRAVASTATIN SODIUM 10 MG
10 TABLET ORAL NIGHTLY
Qty: 30 TABLET | Refills: 3 | Status: SHIPPED | OUTPATIENT
Start: 2018-06-22 | End: 2018-08-20

## 2018-06-22 NOTE — DISCHARGE SUMMARY
[facility] [place] [facility]  DISCHARGE SUMMARY     Lollie Closs Patient Status:  Observation    1965 MRN [id number]   [facility] 2NE-A Attending Sotero Dalal MD   Hosp Day # 0 PCP Jurgen Cuevas MD     Date of Admission: 2018  Date of Tabs  Commonly known as:  ALLEGRA      Take 180 mg by mouth daily. Refills:  0     lisinopril 40 MG Tabs  Commonly known as:  PRINIVIL,ZESTRIL      Take 40 mg by mouth daily. Refills:  0     TAB-A-MADDY MAXIMUM Tabs      Take 1 tablet by mouth daily.

## 2018-06-22 NOTE — PLAN OF CARE
Problem: Patient/Family Goals  Goal: Patient/Family Long Term Goal  Patient's Long Term Goal: Resolve chest pain     Interventions:  - pain management   - See additional Care Plan goals for specific interventions    Outcome: Progressing    Goal: Patient/Fa

## 2018-06-22 NOTE — PROGRESS NOTES
Nuclear stress test    Pt completed treadmill portion of stress test walking 10:41 on Stone protocol  Pt achieved  (92% APMHR)  Pt denied cardiac symptoms   Occasional isolated PVCs seen in recovery  Nuclear images pending  Pt tolerated well.     Joleen Fails

## 2018-06-22 NOTE — PLAN OF CARE
Patient discharged home  IV and monitor removed  Reviewed discharge instructions with patient who verbalized understanding  Patient to [employer] with RN

## 2018-06-22 NOTE — PROGRESS NOTES
06/22/18 3302   Clinical Encounter Type   Visited With Patient   Per consult,  invited patient into an Advance Directives conversation. Liss Nichols was created as per the patient's request. Honored patient's wishes.  The original [facility] was handed o

## 2018-06-22 NOTE — PLAN OF CARE
CARDIOVASCULAR - ADULT    • Maintains optimal cardiac output and hemodynamic stability Progressing    • Absence of cardiac arrhythmias or at baseline Progressing        **NSR on tele, VSS. Denies any CP, SOB, palpitations, or dizziness at this time.

## 2018-06-22 NOTE — PROGRESS NOTES
[facility]  Progress Note    Nj Able Patient Status:  Observation    1965 MRN [id number]   [facility] 2NE-A Attending Himanshu Weaver MD   Hosp Day # 0 PCP Cuba Moura MD       Assessment:    · Chest pain  · HTN  · Hyper Nightly           Klever Ordoñez MD  6/22/2018  9:30 AM

## 2018-08-13 RX ORDER — METOPROLOL SUCCINATE 25 MG/1
TABLET, EXTENDED RELEASE ORAL
Qty: 60 TABLET | Refills: 6 | Status: SHIPPED | OUTPATIENT
Start: 2018-08-13 | End: 2018-09-12 | Stop reason: WASHOUT

## 2018-08-13 NOTE — TELEPHONE ENCOUNTER
Last refilled on 6/23/18 for # 60 with 0 refills (pt has different PCP in Epic)  Last seen on 3/9/18, /84  Future Appointments  Date Time Provider Steve Ardon   8/20/2018 3:15 PM Jenny Franz MD [facility] 3 [facility] [place]        Thank you.

## 2018-08-20 ENCOUNTER — OFFICE VISIT (OUTPATIENT)
Dept: FAMILY MEDICINE CLINIC | Facility: CLINIC | Age: 53
End: 2018-08-20

## 2018-08-20 VITALS
DIASTOLIC BLOOD PRESSURE: 80 MMHG | RESPIRATION RATE: 12 BRPM | SYSTOLIC BLOOD PRESSURE: 130 MMHG | HEIGHT: 72 IN | TEMPERATURE: 98 F | WEIGHT: 290 LBS | HEART RATE: 84 BPM | BODY MASS INDEX: 39.28 KG/M2

## 2018-08-20 DIAGNOSIS — I10 ESSENTIAL HYPERTENSION: ICD-10-CM

## 2018-08-20 DIAGNOSIS — I49.3 FREQUENT PVCS: Primary | ICD-10-CM

## 2018-08-20 DIAGNOSIS — E66.9 OBESITY (BMI 35.0-39.9 WITHOUT COMORBIDITY): ICD-10-CM

## 2018-08-20 DIAGNOSIS — G47.9 SLEEP DIFFICULTIES: ICD-10-CM

## 2018-08-20 PROCEDURE — 1111F DSCHRG MED/CURRENT MED MERGE: CPT | Performed by: FAMILY MEDICINE

## 2018-08-20 PROCEDURE — 99214 OFFICE O/P EST MOD 30 MIN: CPT | Performed by: FAMILY MEDICINE

## 2018-08-20 RX ORDER — LORATADINE 10 MG/1
10 CAPSULE, LIQUID FILLED ORAL NIGHTLY
COMMUNITY
End: 2020-08-26

## 2018-08-20 RX ORDER — ATENOLOL 25 MG/1
12.5 TABLET ORAL DAILY
Qty: 30 TABLET | Refills: 0 | Status: SHIPPED | OUTPATIENT
Start: 2018-08-20 | End: 2018-10-05

## 2018-08-20 NOTE — PROGRESS NOTES
Patient presents with:  Hospital F/U: chest pain      HPI:   Anca Bergman is a 48year old male who presents to the office for f/u after hospitalization for chest pain. Was admitted with chest discomfort, lightheadedness 6/21-6/22/18.   Workup was negat 2. Sleep difficulties  Likely medication SE. Trial of atenolol in lieu of metoprolol  Can consider 10mg melatonin as well. 3. Obesity (BMI 35.0-39.9 without comorbidity)  Need to get this down  290lbs - work on portion sizes.       4. Essential hyp

## 2018-09-23 ENCOUNTER — HOSPITAL ENCOUNTER (EMERGENCY)
Facility: HOSPITAL | Age: 53
Discharge: HOME | End: 2018-09-23
Attending: EMERGENCY MEDICINE
Payer: COMMERCIAL

## 2018-09-23 ENCOUNTER — APPOINTMENT (OUTPATIENT)
Dept: ULTRASOUND IMAGING | Facility: HOSPITAL | Age: 53
End: 2018-09-23
Attending: EMERGENCY MEDICINE
Payer: COMMERCIAL

## 2018-09-23 VITALS
BODY MASS INDEX: 38.6 KG/M2 | DIASTOLIC BLOOD PRESSURE: 72 MMHG | HEIGHT: 72 IN | RESPIRATION RATE: 14 BRPM | HEART RATE: 81 BPM | TEMPERATURE: 98 F | SYSTOLIC BLOOD PRESSURE: 103 MMHG | WEIGHT: 285 LBS | OXYGEN SATURATION: 97 %

## 2018-09-23 DIAGNOSIS — S86.911A MUSCLE STRAIN OF RIGHT LOWER LEG, INITIAL ENCOUNTER: Primary | ICD-10-CM

## 2018-09-23 PROCEDURE — 99284 EMERGENCY DEPT VISIT MOD MDM: CPT

## 2018-09-23 PROCEDURE — 93971 EXTREMITY STUDY: CPT | Performed by: EMERGENCY MEDICINE

## 2018-09-23 NOTE — ED NOTES
Patient is resting comfortably. Pt reevaluated by er physician. Informed of his ultrasound report and plan of care.  Pt verbalizing understanding

## 2018-09-23 NOTE — ED PROVIDER NOTES
Patient Seen in: [facility] Emergency Department    History   Patient presents with:  Deep Vein Thrombosis (cardiovascular)    Stated Complaint: R/O DVT RIGHT LEG    HPI    22-year-old male presents with pain to the right lower extremity.   Symptoms Lungs: good air exchange and clear   Heart: regular rate rhythm and no murmur   Extremities: Negative Homans. No discoloration to the right calf. No swelling to the right calf.   On deep palpation of the proximal quadriceps on the right side he has some the right lower extremity. Instructed to follow-up with PCP in 1-2 days. To return with any concerns.             Disposition and Plan     Clinical Impression:  Muscle strain of right lower leg, initial encounter  (primary encounter diagnosis)    Disposit

## 2018-10-05 ENCOUNTER — TELEPHONE (OUTPATIENT)
Dept: FAMILY MEDICINE CLINIC | Facility: CLINIC | Age: 53
End: 2018-10-05

## 2018-10-05 DIAGNOSIS — I49.3 FREQUENT PVCS: ICD-10-CM

## 2018-10-05 RX ORDER — ATENOLOL 25 MG/1
TABLET ORAL
Qty: 60 TABLET | Refills: 0 | Status: SHIPPED | OUTPATIENT
Start: 2018-10-05 | End: 2018-10-22

## 2018-10-05 NOTE — TELEPHONE ENCOUNTER
Patient requesting to speak to nurse regarding his atenalol medication. Patient was advised to take a full tablet if half of tablet did not work. Patient stated medication is not helping would like to change to something else.    Please contact patient at c

## 2018-10-05 NOTE — TELEPHONE ENCOUNTER
Called and talked to patient explained Dr Mami Burton recommendations and then sent in new order for atenolol

## 2018-10-05 NOTE — TELEPHONE ENCOUNTER
Called and talked to patient he is taking the full 25 mg of atenolol and is still having PVC's by the end of the day, but he is able to sleep on this medication. He is wondering if he needs a different medication or to increase this one.

## 2018-10-05 NOTE — TELEPHONE ENCOUNTER
Happy to hear the sleeping is getting better. We can try a momentary increase in the med - increase to 1.5 or 2 pills and see if that doesn't calm the heart down a little more.   Certainly at those higher doses watch for grogginess, lightheaded, etc.   Diana Vasquez

## 2018-10-19 DIAGNOSIS — I49.3 FREQUENT PVCS: ICD-10-CM

## 2018-10-22 ENCOUNTER — TELEPHONE (OUTPATIENT)
Dept: FAMILY MEDICINE CLINIC | Facility: CLINIC | Age: 53
End: 2018-10-22

## 2018-10-22 RX ORDER — ATENOLOL 25 MG/1
12.5 TABLET ORAL DAILY
Qty: 30 TABLET | Refills: 2 | Status: SHIPPED | OUTPATIENT
Start: 2018-10-22 | End: 2018-10-22

## 2018-10-22 NOTE — TELEPHONE ENCOUNTER
Reviewed directive with pt and he verbalized understanding. Metoprolol Succinate ER does not come in 12.5 mg.I 'm not sure if it is advisable to break a 25 mg tablet. Please advise. Routed to Dr Jose Angel Walters.

## 2018-10-22 NOTE — TELEPHONE ENCOUNTER
I tried to order Metoprolol Tartrate 12.5 mg, but it states that it cannot be ordered. Please advise. Routed to Dr Rosalva Levine.

## 2018-10-22 NOTE — TELEPHONE ENCOUNTER
TC from patient taking Atenolol and doesn't like the way it makes him feel. He states he feels dizzy and \"out of it\".   Please call back at [phone]

## 2018-10-22 NOTE — TELEPHONE ENCOUNTER
Lets have him stop the atenolol, and go on the metoprolol ER at a lower dose - 12.5mg ER  Se if this doesn't help energy some, and stop the sleep issues he was experiencing    Ok to given 30 days for this trial here

## 2018-10-22 NOTE — TELEPHONE ENCOUNTER
LOV 8/20/18 and at that time, pt was switched from Metoprolol to Atenolol because pt found that he was waking up several times nightly. Pt reports that BP yesterday was 128/83, HR 71. He states that BP has been between 120-130/80's.     Pt has been [place]

## 2018-10-24 ENCOUNTER — APPOINTMENT (OUTPATIENT)
Dept: GENERAL RADIOLOGY | Facility: HOSPITAL | Age: 53
End: 2018-10-24
Attending: EMERGENCY MEDICINE
Payer: COMMERCIAL

## 2018-10-24 ENCOUNTER — HOSPITAL ENCOUNTER (OUTPATIENT)
Facility: HOSPITAL | Age: 53
Setting detail: OBSERVATION
Discharge: HOME HEALTH | End: 2018-10-25
Attending: EMERGENCY MEDICINE | Admitting: HOSPITALIST
Payer: COMMERCIAL

## 2018-10-24 ENCOUNTER — APPOINTMENT (OUTPATIENT)
Dept: CT IMAGING | Facility: HOSPITAL | Age: 53
End: 2018-10-24
Attending: INTERNAL MEDICINE
Payer: COMMERCIAL

## 2018-10-24 ENCOUNTER — PRIOR ORIGINAL RECORDS (OUTPATIENT)
Dept: OTHER | Age: 53
End: 2018-10-24

## 2018-10-24 DIAGNOSIS — I10 BENIGN ESSENTIAL HTN: ICD-10-CM

## 2018-10-24 DIAGNOSIS — R53.83 OTHER FATIGUE: ICD-10-CM

## 2018-10-24 DIAGNOSIS — R00.2 PALPITATIONS: Primary | ICD-10-CM

## 2018-10-24 DIAGNOSIS — R07.9 CHEST PAIN OF UNCERTAIN ETIOLOGY: ICD-10-CM

## 2018-10-24 PROBLEM — R73.9 HYPERGLYCEMIA: Status: ACTIVE | Noted: 2018-10-24

## 2018-10-24 PROCEDURE — 71045 X-RAY EXAM CHEST 1 VIEW: CPT | Performed by: EMERGENCY MEDICINE

## 2018-10-24 PROCEDURE — 99219 INITIAL OBSERVATION CARE,LEVL II: CPT | Performed by: INTERNAL MEDICINE

## 2018-10-24 PROCEDURE — 71275 CT ANGIOGRAPHY CHEST: CPT | Performed by: INTERNAL MEDICINE

## 2018-10-24 RX ORDER — ONDANSETRON 2 MG/ML
4 INJECTION INTRAMUSCULAR; INTRAVENOUS EVERY 6 HOURS PRN
Status: DISCONTINUED | OUTPATIENT
Start: 2018-10-24 | End: 2018-10-25

## 2018-10-24 RX ORDER — METOPROLOL TARTRATE 50 MG/1
50 TABLET, FILM COATED ORAL ONCE AS NEEDED
Status: COMPLETED | OUTPATIENT
Start: 2018-10-25 | End: 2018-10-24

## 2018-10-24 RX ORDER — METOPROLOL TARTRATE 5 MG/5ML
INJECTION INTRAVENOUS
Status: DISCONTINUED
Start: 2018-10-24 | End: 2018-10-24 | Stop reason: WASHOUT

## 2018-10-24 RX ORDER — ALPRAZOLAM 0.5 MG/1
0.25 TABLET ORAL ONCE AS NEEDED
Status: DISCONTINUED | OUTPATIENT
Start: 2018-10-25 | End: 2018-10-25

## 2018-10-24 RX ORDER — NITROGLYCERIN 0.4 MG/1
TABLET SUBLINGUAL
Status: COMPLETED
Start: 2018-10-24 | End: 2018-10-24

## 2018-10-24 RX ORDER — ACETAMINOPHEN 325 MG/1
650 TABLET ORAL EVERY 6 HOURS PRN
Status: DISCONTINUED | OUTPATIENT
Start: 2018-10-24 | End: 2018-10-25

## 2018-10-24 RX ORDER — POTASSIUM CHLORIDE 20 MEQ/1
40 TABLET, EXTENDED RELEASE ORAL ONCE
Status: COMPLETED | OUTPATIENT
Start: 2018-10-24 | End: 2018-10-24

## 2018-10-24 RX ORDER — METOPROLOL TARTRATE 50 MG/1
100 TABLET, FILM COATED ORAL ONCE
Status: DISCONTINUED | OUTPATIENT
Start: 2018-10-24 | End: 2018-10-24

## 2018-10-24 RX ORDER — NITROGLYCERIN 0.4 MG/1
0.4 TABLET SUBLINGUAL ONCE
Status: COMPLETED | OUTPATIENT
Start: 2018-10-24 | End: 2018-10-24

## 2018-10-24 RX ORDER — METOPROLOL TARTRATE 50 MG/1
100 TABLET, FILM COATED ORAL ONCE
Status: COMPLETED | OUTPATIENT
Start: 2018-10-24 | End: 2018-10-24

## 2018-10-24 RX ORDER — MULTIPLE VITAMINS W/ MINERALS TAB 9MG-400MCG
1 TAB ORAL DAILY
Status: DISCONTINUED | OUTPATIENT
Start: 2018-10-24 | End: 2018-10-25

## 2018-10-24 RX ORDER — METOPROLOL TARTRATE 50 MG/1
50 TABLET, FILM COATED ORAL ONCE
Status: COMPLETED | OUTPATIENT
Start: 2018-10-24 | End: 2018-10-24

## 2018-10-24 RX ORDER — ASPIRIN 81 MG/1
324 TABLET, CHEWABLE ORAL ONCE
Status: COMPLETED | OUTPATIENT
Start: 2018-10-24 | End: 2018-10-24

## 2018-10-24 RX ORDER — METOCLOPRAMIDE HYDROCHLORIDE 5 MG/ML
10 INJECTION INTRAMUSCULAR; INTRAVENOUS EVERY 8 HOURS PRN
Status: DISCONTINUED | OUTPATIENT
Start: 2018-10-24 | End: 2018-10-25

## 2018-10-24 RX ORDER — MAGNESIUM OXIDE 400 MG (241.3 MG MAGNESIUM) TABLET
400 TABLET DAILY
Status: DISCONTINUED | OUTPATIENT
Start: 2018-10-24 | End: 2018-10-25

## 2018-10-24 RX ORDER — METOPROLOL TARTRATE 5 MG/5ML
5 INJECTION INTRAVENOUS ONCE
Status: COMPLETED | OUTPATIENT
Start: 2018-10-24 | End: 2018-10-24

## 2018-10-24 RX ORDER — METOPROLOL TARTRATE 50 MG/1
50 TABLET, FILM COATED ORAL ONCE
Status: DISCONTINUED | OUTPATIENT
Start: 2018-10-25 | End: 2018-10-25

## 2018-10-24 RX ORDER — LISINOPRIL 20 MG/1
40 TABLET ORAL DAILY
Status: DISCONTINUED | OUTPATIENT
Start: 2018-10-25 | End: 2018-10-25

## 2018-10-24 RX ORDER — HEPARIN SODIUM 5000 [USP'U]/ML
5000 INJECTION, SOLUTION INTRAVENOUS; SUBCUTANEOUS EVERY 8 HOURS SCHEDULED
Status: DISCONTINUED | OUTPATIENT
Start: 2018-10-24 | End: 2018-10-25

## 2018-10-24 RX ORDER — METOPROLOL TARTRATE 50 MG/1
100 TABLET, FILM COATED ORAL ONCE
Status: DISCONTINUED | OUTPATIENT
Start: 2018-10-25 | End: 2018-10-25

## 2018-10-24 RX ORDER — LORAZEPAM 2 MG/ML
0.5 INJECTION INTRAMUSCULAR ONCE
Status: COMPLETED | OUTPATIENT
Start: 2018-10-24 | End: 2018-10-24

## 2018-10-24 RX ORDER — LISINOPRIL 40 MG/1
40 TABLET ORAL DAILY
Status: ON HOLD | COMMUNITY
End: 2018-10-25

## 2018-10-24 RX ORDER — METOPROLOL TARTRATE 50 MG/1
50 TABLET, FILM COATED ORAL ONCE
Status: DISCONTINUED | OUTPATIENT
Start: 2018-10-24 | End: 2018-10-24

## 2018-10-24 RX ORDER — CETIRIZINE HYDROCHLORIDE 10 MG/1
10 TABLET ORAL DAILY
Status: DISCONTINUED | OUTPATIENT
Start: 2018-10-24 | End: 2018-10-25

## 2018-10-24 RX ORDER — FLUTICASONE PROPIONATE 50 MCG
2 SPRAY, SUSPENSION (ML) NASAL NIGHTLY
Status: DISCONTINUED | OUTPATIENT
Start: 2018-10-24 | End: 2018-10-25

## 2018-10-24 NOTE — PLAN OF CARE
NURSING ADMISSION NOTE      Patient admitted via cart  oriented to room. Safety precautions initiated. Bed in low position. Call light in reach.

## 2018-10-24 NOTE — ED PROVIDER NOTES
Patient Seen in: [facility] Emergency Department    History   Patient presents with:  Chest Pain Angina (cardiovascular)    Stated Complaint: chest pain, skakey, dizzy, BP was up.     HPI    Chest pain-patient presents to the emergency department sta (36.8 °C) (Temporal)   Resp 18   Ht 182.9 cm (6')   Wt 127.9 kg   SpO2 95%   BMI 38.25 kg/m²         Physical Exam    Pleasant obese middle-aged man lying on the emergency department bed he appears slightly anxious his HEENT exam reveals dry oral mucosa a EKG, for seen in this field, there does seem to be an intraventricular conduction delay when made in comparison to an EKG dated June 21, 2018, there are no significant EKG changes to suggest ischemia              XR CHEST AP PORTABLE  (CPT=71045)    (Resul

## 2018-10-24 NOTE — CONSULTS
[facility]  Report of Consultation    Anastasia Hernandez Patient Status:  Observation    1965 MRN [id number]   [facility] 0SW-A Attending Alexander Oliveros MD   Hosp Day # 0 PCP Negrita Boland MD     Reason for Consultation: palpit drugs.     Allergies:    Bactrim [Sulfametho*    RASH    Medications:    Current Facility-Administered Medications:  Potassium Chloride ER (K-DUR M20) CR tab 40 mEq 40 mEq Oral Once   [START ON 10/25/2018] influenza vaccine split quad (FLULAVAL) ages 5 flakito Benign essential HTN     Obesity (BMI 35.0-39.9 without comorbidity)     Asbestos exposure     Acute bilateral low back pain with bilateral sciatica     Chest pain, atypical     Hyperglycemia     Palpitations     Chest pain of uncertain etiology     Other

## 2018-10-24 NOTE — H&P
[facility]  History and Physical     Tariqlena Naylor Patient Status:  Emergency    1965 MRN [id number]   Location 6[facility] Attending Caesar Tracey MD   [address] Day # 0 PCP Jalen Polanco MD     Chief Complaint: Encounter:  metoprolol Tartrate 25 MG Oral Tab Take 0.5 tablets (12.5 mg total) by mouth daily. Disp: 30 tablet Rfl: 0   Loratadine (CLARITIN) 10 MG Oral Cap Take by mouth.  Disp:  Rfl:    lisinopril 40 MG Oral Tab Take 0.5 tablets (20 mg total) by mouth da 40   BILT  1.0   TP  8.1       Estimated Creatinine Clearance: 87.2 mL/min (based on SCr of 1.1 mg/dL).     Recent Labs   Lab  10/24/18   0611   PTP  13.2   INR  0.96       Recent Labs   Lab  10/24/18   0611   TROP  <0.046       Imaging: Imaging data revie

## 2018-10-24 NOTE — PLAN OF CARE
CARDIOVASCULAR - ADULT    • Maintains optimal cardiac output and hemodynamic stability Progressing    • Absence of cardiac arrhythmias or at baseline Progressing        METABOLIC/FLUID AND ELECTROLYTES - ADULT    • Electrolytes maintained within normal lopez

## 2018-10-24 NOTE — HISTORICAL OFFICE NOTE
Adelita Rob  [phone]  : 1965  ACCOUNT:  [id number]  PCP: Deneen Pal MD  CARDIOLOGIST: Patrick Cm MD  [facility]  Admitted: 2016  Discharged: 2016    DISCHARGE SUMMARY    DISCHARGE DIAGNOSES:  1. Chest pain.    2. Hyperten substernal chest discomfort that he says would come in waves lasting for 20-30 seconds at a time. These episodes lasted and were bothersome enough that he went to the emergency room at [facility].  Evidently the workup there was negative although that is not av vicoden    MEDS: Aspirin 325 Mg, 1 PO QD    VITAL SIGNS: B/P - 138/108, Pulse - 88, Weight - 246.00, Height - 70   CONS: well developed, well nourished. HEAD/FACE: no trauma and normocephalic. EYES: conjunctivae not injected and no xanthelasma.  ENT: mucosa fluttering heart sensation in his chest. We will see him back here in the office after he has his stress test, echocardiogram, and 24-hour Holter monitor.  I suspect that we will not have a positive stress test. He may be having some supraventricular arrhyt

## 2018-10-25 VITALS
OXYGEN SATURATION: 97 % | RESPIRATION RATE: 18 BRPM | WEIGHT: 282 LBS | SYSTOLIC BLOOD PRESSURE: 108 MMHG | HEIGHT: 72 IN | TEMPERATURE: 98 F | HEART RATE: 75 BPM | BODY MASS INDEX: 38.19 KG/M2 | DIASTOLIC BLOOD PRESSURE: 67 MMHG

## 2018-10-25 PROCEDURE — 99217 OBSERVATION CARE DISCHARGE: CPT | Performed by: INTERNAL MEDICINE

## 2018-10-25 RX ORDER — MAGNESIUM OXIDE 400 MG (241.3 MG MAGNESIUM) TABLET
400 TABLET DAILY
Qty: 30 TABLET | Refills: 0 | Status: SHIPPED | COMMUNITY
Start: 2018-10-25

## 2018-10-25 RX ORDER — LISINOPRIL 40 MG/1
40 TABLET ORAL DAILY
Refills: 0 | Status: SHIPPED | COMMUNITY
Start: 2018-10-25 | End: 2019-05-01

## 2018-10-25 RX ORDER — POTASSIUM CHLORIDE 20 MEQ/1
10 TABLET, EXTENDED RELEASE ORAL DAILY
Status: DISCONTINUED | OUTPATIENT
Start: 2018-10-26 | End: 2018-10-25

## 2018-10-25 RX ORDER — POTASSIUM CHLORIDE 750 MG/1
10 TABLET, EXTENDED RELEASE ORAL DAILY
Qty: 30 TABLET | Refills: 3 | Status: SHIPPED | OUTPATIENT
Start: 2018-10-26 | End: 2019-04-09

## 2018-10-25 RX ORDER — METOPROLOL SUCCINATE 25 MG/1
25 TABLET, EXTENDED RELEASE ORAL
Qty: 30 TABLET | Refills: 3 | Status: SHIPPED | OUTPATIENT
Start: 2018-10-26 | End: 2019-04-18 | Stop reason: DRUGHIGH

## 2018-10-25 NOTE — PROGRESS NOTES
[facility]  Progress Note     Renetta Alfred Patient Status:  Observation    1965 MRN [id number]   [facility] 0SW-A Attending Jessica Downey MD   Hosp Day # 0 PCP Cem Israel MD     Chief Complaint: palpitations    S: oxide  400 mg Oral Daily   • multivitamin with minerals  1 tablet Oral Daily   • Fluticasone Propionate  2 spray Each Nare Nightly   • Heparin Sodium (Porcine)  5,000 Units Subcutaneous Q8H [facility]   • metoprolol tartrate  50 mg Oral Once    Or   • metoprolol

## 2018-10-25 NOTE — DISCHARGE SUMMARY
[facility]  DISCHARGE SUMMARY     Rohan Mack Patient Status:  Observation    1965 MRN [id number]   [facility] 0SW-A Attending Santo Fajardo MD   Hosp Day # 0 PCP Shukri Gustafson MD     Date of Admission: 10/24/2018  D details   Metoprolol Succinate ER 25 MG Tb24  Commonly known as: Toprol XL      Take 1 tablet (25 mg total) by mouth Daily Beta Blocker.    Quantity:  30 tablet  Refills:  3     Potassium Chloride ER 10 MEQ Tbcr  Commonly known as:  K-DUR      Take 1 table

## 2018-10-25 NOTE — PROGRESS NOTES
[facility]  Cardiology Progress Note    Subjective:  No chest pain or shortness of breath. Denies further palpitations. Verbalizes that yesterday shortly after taking Potassium Supplement, his PVC's essentially \"disappeared\" and he felt better.   D setting of K+ 3.7. Appears to be tolerating very low dose Toprol XL 12.5mg PO daily. Echo 6/18 showed EF normal.  · CTA gated thoracic aorta and coronaries shows no obvious CAD with \"0\" Calcium Score.     · Normal caliber thoracic aorta at upper limits

## 2018-10-25 NOTE — PLAN OF CARE
CARDIOVASCULAR - ADULT    • Absence of cardiac arrhythmias or at baseline Progressing        METABOLIC/FLUID AND ELECTROLYTES - ADULT    • Electrolytes maintained within normal limits Progressing        Patient/Family Goals    • Patient/Family Long Term Go

## 2018-10-28 ENCOUNTER — HOSPITAL ENCOUNTER (OUTPATIENT)
Age: 53
Discharge: HOME | End: 2018-10-28
Attending: FAMILY MEDICINE
Payer: COMMERCIAL

## 2018-10-28 ENCOUNTER — APPOINTMENT (OUTPATIENT)
Dept: CT IMAGING | Facility: HOSPITAL | Age: 53
End: 2018-10-28
Attending: FAMILY MEDICINE
Payer: COMMERCIAL

## 2018-10-28 VITALS
OXYGEN SATURATION: 98 % | TEMPERATURE: 98 F | HEIGHT: 72 IN | BODY MASS INDEX: 38.33 KG/M2 | RESPIRATION RATE: 18 BRPM | HEART RATE: 72 BPM | SYSTOLIC BLOOD PRESSURE: 130 MMHG | DIASTOLIC BLOOD PRESSURE: 89 MMHG | WEIGHT: 283 LBS

## 2018-10-28 DIAGNOSIS — N20.0 RENAL CALCULUS, RIGHT: ICD-10-CM

## 2018-10-28 DIAGNOSIS — N23 RENAL COLIC ON RIGHT SIDE: Primary | ICD-10-CM

## 2018-10-28 DIAGNOSIS — R10.9 RIGHT FLANK PAIN: ICD-10-CM

## 2018-10-28 PROCEDURE — 99214 OFFICE O/P EST MOD 30 MIN: CPT

## 2018-10-28 PROCEDURE — 81002 URINALYSIS NONAUTO W/O SCOPE: CPT | Performed by: FAMILY MEDICINE

## 2018-10-28 PROCEDURE — 74176 CT ABD & PELVIS W/O CONTRAST: CPT | Performed by: FAMILY MEDICINE

## 2018-10-28 NOTE — ED PROVIDER NOTES
Patient Seen in: [facility]    History   Patient presents with:  Urinary Symptoms (urologic)    Stated Complaint: [address] in with concerns of right flank pain since yesterday.   Symptoms were gradual in onset a negative except as noted above.     Physical Exam     ED Triage Vitals [10/28/18 1239]   /89   Pulse 72   Resp 18   Temp 98.3 °F (36.8 °C)   Temp src Temporal   SpO2 98 %   O2 Device None (Room air)       Current:/89   Pulse 72   Temp 98.3 °F (3 get CT abdomen and pelvis and call you with results and management. Patient verbalized understanding agreed with the plan of care , will be going to [facility] to get the CT abdomen. Will follow results and call him as needed.   Patient le  No evidence of hydronephrosis.  No ureteral or bladder stones.  No contour deforming masses.  No other specific abnormality.           Dictated by: Garrison Davila MD on 10/28/2018 at 14:09       Approved by: Garrison Davila MD        Called and updated pat

## 2018-10-28 NOTE — ED INITIAL ASSESSMENT (HPI)
Pt thinks he has a kidney stone. Pt states that the pain started yesterday. Pt states that he has right flank pain. Pt states that the pain is a little better today but wants to see if there is still a kidney stone there.  Pt denies visible blood in the ur

## 2018-11-02 ENCOUNTER — OFFICE VISIT (OUTPATIENT)
Dept: FAMILY MEDICINE CLINIC | Facility: CLINIC | Age: 53
End: 2018-11-02

## 2018-11-02 VITALS
TEMPERATURE: 99 F | HEIGHT: 72 IN | RESPIRATION RATE: 12 BRPM | SYSTOLIC BLOOD PRESSURE: 110 MMHG | HEART RATE: 84 BPM | DIASTOLIC BLOOD PRESSURE: 68 MMHG | WEIGHT: 290 LBS | BODY MASS INDEX: 39.28 KG/M2

## 2018-11-02 DIAGNOSIS — N20.0 NEPHROLITHIASIS: ICD-10-CM

## 2018-11-02 DIAGNOSIS — I10 ESSENTIAL HYPERTENSION: ICD-10-CM

## 2018-11-02 DIAGNOSIS — I49.3 PVC'S (PREMATURE VENTRICULAR CONTRACTIONS): Primary | ICD-10-CM

## 2018-11-02 PROCEDURE — 1111F DSCHRG MED/CURRENT MED MERGE: CPT | Performed by: FAMILY MEDICINE

## 2018-11-02 PROCEDURE — 99214 OFFICE O/P EST MOD 30 MIN: CPT | Performed by: FAMILY MEDICINE

## 2018-11-02 RX ORDER — TAMSULOSIN HYDROCHLORIDE 0.4 MG/1
0.4 CAPSULE ORAL DAILY
Qty: 30 CAPSULE | Refills: 0 | Status: SHIPPED | OUTPATIENT
Start: 2018-11-02 | End: 2018-11-29

## 2018-11-02 NOTE — PROGRESS NOTES
Patient presents with:  Hospital F/U: PVC and kidney stone      HPI:   Farida Garcia is a 48year old male who presents to the office for hospital f/u. Prior to admission, we changed med for PVCs.   This was ineffective, so we went back to prev med at a l tenderness/mass/nodules  Lungs: clear to auscultation bilaterally  Heart: S1, S2 normal, no murmur, click, rub or gallop, regular rate and rhythm.   No PVCs heard on exam  Abdomen: soft, non-tender; bowel sounds normal; no masses,  no organomegaly  Extremit 275 - 295 mOsm/kg 290 290   GFR, Non-      >=60 85 77   GFR, -American      >=60 98 89   AST (SGOT)      15 - 41 U/L  23   ALT (SGPT)      17 - 63 U/L  40   ALKALINE PHOSPHATASE      45 - 117 U/L  68   Total Bilirubin      0.1 - 2.0

## 2018-11-04 DIAGNOSIS — I49.3 FREQUENT PVCS: ICD-10-CM

## 2018-11-06 RX ORDER — ATENOLOL 25 MG/1
TABLET ORAL
Qty: 60 TABLET | Refills: 3 | Status: SHIPPED | OUTPATIENT
Start: 2018-11-06 | End: 2018-11-08

## 2018-11-08 ENCOUNTER — TELEPHONE (OUTPATIENT)
Dept: FAMILY MEDICINE CLINIC | Facility: CLINIC | Age: 53
End: 2018-11-08

## 2018-11-08 DIAGNOSIS — I49.3 FREQUENT PVCS: ICD-10-CM

## 2018-11-08 RX ORDER — ATENOLOL 25 MG/1
TABLET ORAL
Qty: 135 TABLET | Refills: 1 | Status: SHIPPED | OUTPATIENT
Start: 2018-11-08 | End: 2018-12-26 | Stop reason: ALTCHOICE

## 2018-11-08 NOTE — TELEPHONE ENCOUNTER
Fax received from [facility] requesting a 90 day refill of Atenolol. Refilled per protocol.

## 2018-11-16 ENCOUNTER — MYAURORA ACCOUNT LINK (OUTPATIENT)
Dept: OTHER | Age: 53
End: 2018-11-16

## 2018-11-16 ENCOUNTER — PRIOR ORIGINAL RECORDS (OUTPATIENT)
Dept: OTHER | Age: 53
End: 2018-11-16

## 2018-11-29 DIAGNOSIS — N20.0 NEPHROLITHIASIS: ICD-10-CM

## 2018-11-29 NOTE — TELEPHONE ENCOUNTER
Called pt to inquire whether he had passed the kidney stones. He has passed one stone but thinks he needs another refill of the Tamsolusin. 0.4mg    Pended refill routed to Dr Erwin Costa

## 2018-11-30 RX ORDER — TAMSULOSIN HYDROCHLORIDE 0.4 MG/1
CAPSULE ORAL
Qty: 30 CAPSULE | Refills: 0 | Status: SHIPPED | OUTPATIENT
Start: 2018-11-30 | End: 2018-12-26 | Stop reason: ALTCHOICE

## 2018-12-04 LAB
ALBUMIN: 4 G/DL
ALKALINE PHOSPHATATE(ALK PHOS): 68 IU/L
BILIRUBIN TOTAL: 1 MG/DL
BUN: 22 MG/DL
CALCIUM: 9.9 MG/DL
CHLORIDE: 103 MEQ/L
CREATININE, SERUM: 1.1 MG/DL
GLOBULIN: 4.1 G/DL
GLUCOSE: 117 MG/DL
HEMATOCRIT: 47.9 %
HEMOGLOBIN: 16.7 G/DL
PLATELETS: 222 K/UL
POTASSIUM, SERUM: 3.7 MEQ/L
PROTEIN, TOTAL: 8.1 G/DL
RED BLOOD COUNT: 5.35 X 10-6/U
SGOT (AST): 23 IU/L
SGPT (ALT): 40 IU/L
SODIUM: 138 MEQ/L
THYROID STIMULATING HORMONE: 1.59 MLU/L
WHITE BLOOD COUNT: 7.5 X 10-3/U

## 2018-12-11 ENCOUNTER — LAB ENCOUNTER (OUTPATIENT)
Dept: LAB | Age: 53
End: 2018-12-11
Attending: INTERNAL MEDICINE
Payer: COMMERCIAL

## 2018-12-11 ENCOUNTER — PRIOR ORIGINAL RECORDS (OUTPATIENT)
Dept: OTHER | Age: 53
End: 2018-12-11

## 2018-12-11 DIAGNOSIS — R00.2 PALPITATIONS: ICD-10-CM

## 2018-12-11 DIAGNOSIS — I10 BENIGN ESSENTIAL HTN: ICD-10-CM

## 2018-12-11 DIAGNOSIS — I10 HYPERTENSION: Primary | ICD-10-CM

## 2018-12-11 DIAGNOSIS — R07.2 PRECORDIAL PAIN: ICD-10-CM

## 2018-12-11 PROCEDURE — 36415 COLL VENOUS BLD VENIPUNCTURE: CPT

## 2018-12-11 PROCEDURE — 80048 BASIC METABOLIC PNL TOTAL CA: CPT

## 2018-12-11 PROCEDURE — 83735 ASSAY OF MAGNESIUM: CPT

## 2018-12-13 ENCOUNTER — PRIOR ORIGINAL RECORDS (OUTPATIENT)
Dept: OTHER | Age: 53
End: 2018-12-13

## 2018-12-26 ENCOUNTER — OFFICE VISIT (OUTPATIENT)
Dept: FAMILY MEDICINE CLINIC | Facility: CLINIC | Age: 53
End: 2018-12-26

## 2018-12-26 VITALS
SYSTOLIC BLOOD PRESSURE: 122 MMHG | RESPIRATION RATE: 14 BRPM | WEIGHT: 299 LBS | DIASTOLIC BLOOD PRESSURE: 80 MMHG | BODY MASS INDEX: 40.5 KG/M2 | HEART RATE: 76 BPM | TEMPERATURE: 98 F | HEIGHT: 72 IN

## 2018-12-26 DIAGNOSIS — N45.1 EPIDIDYMITIS: ICD-10-CM

## 2018-12-26 DIAGNOSIS — M62.830 BACK MUSCLE SPASM: Primary | ICD-10-CM

## 2018-12-26 PROCEDURE — 99213 OFFICE O/P EST LOW 20 MIN: CPT | Performed by: FAMILY MEDICINE

## 2018-12-26 RX ORDER — POTASSIUM CHLORIDE 750 MG/1
10 TABLET, FILM COATED, EXTENDED RELEASE ORAL
Refills: 3 | COMMUNITY
Start: 2018-11-20 | End: 2019-10-30

## 2018-12-26 RX ORDER — CYCLOBENZAPRINE HCL 10 MG
10 TABLET ORAL 2 TIMES DAILY PRN
Qty: 30 TABLET | Refills: 0 | Status: SHIPPED | OUTPATIENT
Start: 2018-12-26 | End: 2019-01-15

## 2018-12-26 RX ORDER — LEVOFLOXACIN 500 MG/1
500 TABLET, FILM COATED ORAL DAILY
Qty: 10 TABLET | Refills: 0 | Status: SHIPPED | OUTPATIENT
Start: 2018-12-26 | End: 2019-01-02

## 2018-12-26 NOTE — PROGRESS NOTES
Patient presents with:  Low Back Pain     HPI:   Juan A Fuller is a 48year old male who presents to the office for back injury. He recalls had similar episode in March. He woke up 7 days ago and back was stiff and has not improved since.     Ibuprofen

## 2018-12-31 LAB
BUN: 15 MG/DL
CALCIUM: 8.7 MG/DL
CHLORIDE: 105 MEQ/L
CREATININE, SERUM: 0.9 MG/DL
GLUCOSE: 94 MG/DL
MAGNESIUM: 2.3 MG/DL
POTASSIUM, SERUM: 4.1 MEQ/L
SODIUM: 138 MEQ/L

## 2019-01-02 ENCOUNTER — TELEPHONE (OUTPATIENT)
Dept: FAMILY MEDICINE CLINIC | Facility: CLINIC | Age: 54
End: 2019-01-02

## 2019-01-02 NOTE — TELEPHONE ENCOUNTER
LOV 12/26/18 placed on Levaquin for epididymitis. Medication was taken for 6 days. He sx included HA,feeling anxious,sick stomach and bloating. He did not take the levaquin yesterday and his symptoms are relieved this morning.   The epididymitis symptoms

## 2019-01-02 NOTE — TELEPHONE ENCOUNTER
Yes, I think the med is responsible  Stop Levaquin, add to allergy list    No further treatment of epididymits at this time. Let's keep an ey lisa it for now.

## 2019-01-02 NOTE — TELEPHONE ENCOUNTER
Patient was put on an antibiotic, states it is not sitting well with him, headache, nausea, bloating, and lethargic.

## 2019-01-02 NOTE — TELEPHONE ENCOUNTER
Called and talked to patient told him to stop Levaquin and just monitor the condition patient agreed to this

## 2019-01-07 ENCOUNTER — OFFICE VISIT (OUTPATIENT)
Dept: FAMILY MEDICINE CLINIC | Facility: CLINIC | Age: 54
End: 2019-01-07

## 2019-01-07 VITALS
BODY MASS INDEX: 39.14 KG/M2 | DIASTOLIC BLOOD PRESSURE: 74 MMHG | RESPIRATION RATE: 16 BRPM | HEART RATE: 101 BPM | WEIGHT: 289 LBS | SYSTOLIC BLOOD PRESSURE: 128 MMHG | OXYGEN SATURATION: 96 % | TEMPERATURE: 98 F | HEIGHT: 72 IN

## 2019-01-07 DIAGNOSIS — N45.1 EPIDIDYMITIS: Primary | ICD-10-CM

## 2019-01-07 DIAGNOSIS — R10.2 PELVIC PAIN IN MALE: ICD-10-CM

## 2019-01-07 DIAGNOSIS — R30.0 DYSURIA: ICD-10-CM

## 2019-01-07 LAB
BILIRUBIN: NEGATIVE
GLUCOSE (URINE DIPSTICK): NEGATIVE MG/DL
KETONES (URINE DIPSTICK): NEGATIVE MG/DL
LEUKOCYTES: NEGATIVE
MULTISTIX LOT#: NORMAL NUMERIC
NITRITE, URINE: NEGATIVE
OCCULT BLOOD: NEGATIVE
PH, URINE: 6.5 (ref 4.5–8)
PROTEIN (URINE DIPSTICK): NEGATIVE MG/DL
SPECIFIC GRAVITY: 1.02 (ref 1–1.03)
URINE-COLOR: YELLOW
UROBILINOGEN,SEMI-QN: 0.2 MG/DL (ref 0–1.9)

## 2019-01-07 PROCEDURE — 99213 OFFICE O/P EST LOW 20 MIN: CPT | Performed by: FAMILY MEDICINE

## 2019-01-07 PROCEDURE — 81003 URINALYSIS AUTO W/O SCOPE: CPT | Performed by: FAMILY MEDICINE

## 2019-01-07 RX ORDER — CIPROFLOXACIN 500 MG/1
500 TABLET, FILM COATED ORAL 2 TIMES DAILY
Qty: 20 TABLET | Refills: 0 | Status: SHIPPED | OUTPATIENT
Start: 2019-01-07 | End: 2019-01-14

## 2019-01-07 NOTE — PROGRESS NOTES
Patient presents with:  Back Pain: F/U on back pain and epididymitus.  Pt states he has lower abdominal discomfort and nausea x 3 days     HPI:   Anastasia Hernandez is a 48year old male who presents to the office for f/u/  Seen 12/26/18 for low back pain, and

## 2019-01-14 ENCOUNTER — HOSPITAL ENCOUNTER (EMERGENCY)
Facility: HOSPITAL | Age: 54
Discharge: HOME | End: 2019-01-14
Attending: EMERGENCY MEDICINE
Payer: COMMERCIAL

## 2019-01-14 VITALS
HEART RATE: 75 BPM | HEIGHT: 72 IN | SYSTOLIC BLOOD PRESSURE: 107 MMHG | WEIGHT: 285 LBS | BODY MASS INDEX: 38.6 KG/M2 | RESPIRATION RATE: 16 BRPM | DIASTOLIC BLOOD PRESSURE: 78 MMHG | TEMPERATURE: 99 F | OXYGEN SATURATION: 94 %

## 2019-01-14 DIAGNOSIS — H81.10 BENIGN PAROXYSMAL POSITIONAL VERTIGO, UNSPECIFIED LATERALITY: Primary | ICD-10-CM

## 2019-01-14 LAB
ATRIAL RATE: 88 BPM
P AXIS: 46 DEGREES
P-R INTERVAL: 184 MS
Q-T INTERVAL: 378 MS
QRS DURATION: 108 MS
QTC CALCULATION (BEZET): 457 MS
R AXIS: 58 DEGREES
T AXIS: 11 DEGREES
VENTRICULAR RATE: 88 BPM

## 2019-01-14 PROCEDURE — 96374 THER/PROPH/DIAG INJ IV PUSH: CPT

## 2019-01-14 PROCEDURE — 99285 EMERGENCY DEPT VISIT HI MDM: CPT

## 2019-01-14 PROCEDURE — 99284 EMERGENCY DEPT VISIT MOD MDM: CPT

## 2019-01-14 PROCEDURE — 93005 ELECTROCARDIOGRAM TRACING: CPT

## 2019-01-14 PROCEDURE — 93010 ELECTROCARDIOGRAM REPORT: CPT

## 2019-01-14 RX ORDER — MECLIZINE HYDROCHLORIDE 25 MG/1
50 TABLET ORAL ONCE
Status: COMPLETED | OUTPATIENT
Start: 2019-01-14 | End: 2019-01-14

## 2019-01-14 RX ORDER — ONDANSETRON 2 MG/ML
4 INJECTION INTRAMUSCULAR; INTRAVENOUS ONCE
Status: COMPLETED | OUTPATIENT
Start: 2019-01-14 | End: 2019-01-14

## 2019-01-14 RX ORDER — MECLIZINE HYDROCHLORIDE 25 MG/1
25 TABLET ORAL 3 TIMES DAILY PRN
Qty: 30 TABLET | Refills: 0 | Status: SHIPPED | OUTPATIENT
Start: 2019-01-14 | End: 2019-04-09

## 2019-01-14 RX ORDER — ONDANSETRON 4 MG/1
4 TABLET, ORALLY DISINTEGRATING ORAL EVERY 4 HOURS PRN
Qty: 10 TABLET | Refills: 0 | Status: SHIPPED | OUTPATIENT
Start: 2019-01-14 | End: 2019-01-21

## 2019-01-14 NOTE — ED PROVIDER NOTES
Patient Seen in: [facility] Emergency Department    History   Patient presents with:  Dizziness (neurologic)  Nausea/Vomiting/Diarrhea (gastrointestinal)    Stated Complaint: dizzy,     HPI    \"I have vertigo really bad\"- spinning of room with tj sentences his facial symmetry is normal his pupils are equal round and reactive to light his extraocular movements are intact his TMs are bulging with clear effusions bilaterally he does have some postnasal drip and some boggy nasal mucosa and somewhat of Given his very benign exam and his negative cerebellar testing I do believe that this is positional vertigo and patient would definitely improve with Epley's maneuvers. I have given him information for such and follow-up with ENT.             Disposition a

## 2019-02-28 VITALS
BODY MASS INDEX: 39.22 KG/M2 | DIASTOLIC BLOOD PRESSURE: 72 MMHG | WEIGHT: 274 LBS | HEART RATE: 84 BPM | HEIGHT: 70 IN | SYSTOLIC BLOOD PRESSURE: 120 MMHG

## 2019-03-15 ENCOUNTER — TELEPHONE (OUTPATIENT)
Dept: CARDIOLOGY | Age: 54
End: 2019-03-15

## 2019-03-20 ENCOUNTER — TELEPHONE (OUTPATIENT)
Dept: CARDIOLOGY | Age: 54
End: 2019-03-20

## 2019-03-21 ENCOUNTER — TELEPHONE (OUTPATIENT)
Dept: CARDIOLOGY | Age: 54
End: 2019-03-21

## 2019-03-22 ENCOUNTER — LAB ENCOUNTER (OUTPATIENT)
Dept: LAB | Age: 54
End: 2019-03-22
Attending: FAMILY MEDICINE
Payer: COMMERCIAL

## 2019-03-22 ENCOUNTER — HOSPITAL ENCOUNTER (OUTPATIENT)
Dept: GENERAL RADIOLOGY | Age: 54
Discharge: HOME | End: 2019-03-22
Attending: FAMILY MEDICINE
Payer: COMMERCIAL

## 2019-03-22 ENCOUNTER — OFFICE VISIT (OUTPATIENT)
Dept: FAMILY MEDICINE CLINIC | Facility: CLINIC | Age: 54
End: 2019-03-22

## 2019-03-22 VITALS
HEIGHT: 72 IN | SYSTOLIC BLOOD PRESSURE: 110 MMHG | TEMPERATURE: 98 F | HEART RATE: 84 BPM | WEIGHT: 282 LBS | DIASTOLIC BLOOD PRESSURE: 70 MMHG | RESPIRATION RATE: 16 BRPM | BODY MASS INDEX: 38.19 KG/M2

## 2019-03-22 DIAGNOSIS — R53.83 FATIGUE, UNSPECIFIED TYPE: ICD-10-CM

## 2019-03-22 DIAGNOSIS — I49.3 FREQUENT PVCS: ICD-10-CM

## 2019-03-22 DIAGNOSIS — R59.0 AXILLARY LYMPHADENOPATHY: Primary | ICD-10-CM

## 2019-03-22 DIAGNOSIS — Z80.7 FAMILY HISTORY OF HODGKIN'S LYMPHOMA: ICD-10-CM

## 2019-03-22 DIAGNOSIS — R59.0 AXILLARY LYMPHADENOPATHY: ICD-10-CM

## 2019-03-22 LAB
ALBUMIN SERPL-MCNC: 4 G/DL (ref 3.4–5)
ALBUMIN/GLOB SERPL: 1.1 {RATIO} (ref 1–2)
ALP LIVER SERPL-CCNC: 55 U/L (ref 45–117)
ALT SERPL-CCNC: 41 U/L (ref 16–61)
ANION GAP SERPL CALC-SCNC: 7 MMOL/L (ref 0–18)
AST SERPL-CCNC: 22 U/L (ref 15–37)
BASOPHILS # BLD AUTO: 0.02 X10(3) UL (ref 0–0.2)
BASOPHILS NFR BLD AUTO: 0.3 %
BILIRUB SERPL-MCNC: 0.7 MG/DL (ref 0.1–2)
BUN BLD-MCNC: 25 MG/DL (ref 7–18)
BUN/CREAT SERPL: 22.7 (ref 10–20)
CALCIUM BLD-MCNC: 9.3 MG/DL (ref 8.5–10.1)
CHLORIDE SERPL-SCNC: 108 MMOL/L (ref 98–107)
CO2 SERPL-SCNC: 26 MMOL/L (ref 21–32)
CREAT BLD-MCNC: 1.1 MG/DL (ref 0.7–1.3)
CRP SERPL-MCNC: <0.29 MG/DL (ref ?–0.3)
DEPRECATED RDW RBC AUTO: 42.6 FL (ref 35.1–46.3)
EOSINOPHIL # BLD AUTO: 0.03 X10(3) UL (ref 0–0.7)
EOSINOPHIL NFR BLD AUTO: 0.5 %
ERYTHROCYTE [DISTWIDTH] IN BLOOD BY AUTOMATED COUNT: 12.4 % (ref 11–15)
GLOBULIN PLAS-MCNC: 3.8 G/DL (ref 2.8–4.4)
GLUCOSE BLD-MCNC: 108 MG/DL (ref 70–99)
HCT VFR BLD AUTO: 46.4 % (ref 39–53)
HGB BLD-MCNC: 15.2 G/DL (ref 13–17.5)
IMM GRANULOCYTES # BLD AUTO: 0.02 X10(3) UL (ref 0–1)
IMM GRANULOCYTES NFR BLD: 0.3 %
LDH SERPL L TO P-CCNC: 198 U/L (ref 87–241)
LYMPHOCYTES # BLD AUTO: 2.11 X10(3) UL (ref 1–4)
LYMPHOCYTES NFR BLD AUTO: 32.5 %
M PROTEIN MFR SERPL ELPH: 7.8 G/DL (ref 6.4–8.2)
MCH RBC QN AUTO: 30.7 PG (ref 26–34)
MCHC RBC AUTO-ENTMCNC: 32.8 G/DL (ref 31–37)
MCV RBC AUTO: 93.7 FL (ref 80–100)
MONOCYTES # BLD AUTO: 0.69 X10(3) UL (ref 0.1–1)
MONOCYTES NFR BLD AUTO: 10.6 %
NEUTROPHILS # BLD AUTO: 3.62 X10 (3) UL (ref 1.5–7.7)
NEUTROPHILS # BLD AUTO: 3.62 X10(3) UL (ref 1.5–7.7)
NEUTROPHILS NFR BLD AUTO: 55.8 %
OSMOLALITY SERPL CALC.SUM OF ELEC: 297 MOSM/KG (ref 275–295)
PLATELET # BLD AUTO: 242 10(3)UL (ref 150–450)
POTASSIUM SERPL-SCNC: 4.4 MMOL/L (ref 3.5–5.1)
RBC # BLD AUTO: 4.95 X10(6)UL (ref 4.3–5.7)
SED RATE-ML: 6 MM/HR (ref 0–12)
SODIUM SERPL-SCNC: 141 MMOL/L (ref 136–145)
TSI SER-ACNC: 1.18 MIU/ML (ref 0.36–3.74)
WBC # BLD AUTO: 6.5 X10(3) UL (ref 4–11)

## 2019-03-22 PROCEDURE — 80053 COMPREHEN METABOLIC PANEL: CPT

## 2019-03-22 PROCEDURE — 99214 OFFICE O/P EST MOD 30 MIN: CPT | Performed by: FAMILY MEDICINE

## 2019-03-22 PROCEDURE — 71046 X-RAY EXAM CHEST 2 VIEWS: CPT | Performed by: FAMILY MEDICINE

## 2019-03-22 PROCEDURE — 84443 ASSAY THYROID STIM HORMONE: CPT

## 2019-03-22 PROCEDURE — 85025 COMPLETE CBC W/AUTO DIFF WBC: CPT

## 2019-03-22 PROCEDURE — 36415 COLL VENOUS BLD VENIPUNCTURE: CPT

## 2019-03-22 PROCEDURE — 85652 RBC SED RATE AUTOMATED: CPT

## 2019-03-22 PROCEDURE — 83615 LACTATE (LD) (LDH) ENZYME: CPT

## 2019-03-22 PROCEDURE — 86140 C-REACTIVE PROTEIN: CPT

## 2019-03-22 NOTE — PROGRESS NOTES
Patient presents with:  Arm Pain: dull pain under arms,  noticed dime size lump underneath both arms  Groin Pain: dull pain on both sides of groin radiating into thighs, small lump on inner left thigh     HPI:   Sharad Guzman is a 48year old male who pr both sides of groin radiating into thighs, small lump on inner left thigh). 1. Axillary lymphadenopathy  Concerning symptoms and lymph nodes. Will check blood work. Get CXR to look for lung pathology, mediastinal status.    - CBC WITH DIFFERENTIAL

## 2019-03-25 RX ORDER — MAGNESIUM OXIDE 400 MG/1
400 TABLET ORAL
COMMUNITY
Start: 2018-10-25

## 2019-03-25 RX ORDER — METOPROLOL SUCCINATE 25 MG/1
25 TABLET, EXTENDED RELEASE ORAL DAILY
COMMUNITY
Start: 2018-10-26 | End: 2019-11-19 | Stop reason: ALTCHOICE

## 2019-03-25 RX ORDER — LORATADINE 10 MG/1
10 CAPSULE, LIQUID FILLED ORAL
COMMUNITY

## 2019-03-25 RX ORDER — POTASSIUM CHLORIDE 750 MG/1
10 TABLET, FILM COATED, EXTENDED RELEASE ORAL
COMMUNITY
Start: 2018-11-20 | End: 2019-03-26 | Stop reason: SDUPTHER

## 2019-03-25 RX ORDER — MECLIZINE HYDROCHLORIDE 25 MG/1
25 TABLET ORAL
COMMUNITY
Start: 2019-01-14 | End: 2019-11-19 | Stop reason: ALTCHOICE

## 2019-03-25 RX ORDER — POTASSIUM CHLORIDE 750 MG/1
1 TABLET, EXTENDED RELEASE ORAL DAILY
COMMUNITY
Start: 2019-02-15 | End: 2019-11-19 | Stop reason: SDUPTHER

## 2019-03-26 ENCOUNTER — OFFICE VISIT (OUTPATIENT)
Dept: CARDIOLOGY | Age: 54
End: 2019-03-26

## 2019-03-26 ENCOUNTER — ANCILLARY PROCEDURE (OUTPATIENT)
Dept: CARDIOLOGY | Age: 54
End: 2019-03-26
Attending: CLINICAL NURSE SPECIALIST

## 2019-03-26 VITALS
DIASTOLIC BLOOD PRESSURE: 80 MMHG | HEIGHT: 70 IN | SYSTOLIC BLOOD PRESSURE: 122 MMHG | WEIGHT: 288 LBS | BODY MASS INDEX: 41.23 KG/M2 | HEART RATE: 58 BPM

## 2019-03-26 DIAGNOSIS — I49.3 PVCS (PREMATURE VENTRICULAR CONTRACTIONS): ICD-10-CM

## 2019-03-26 DIAGNOSIS — R00.2 PALPITATIONS: ICD-10-CM

## 2019-03-26 DIAGNOSIS — R06.83 SNORING: ICD-10-CM

## 2019-03-26 DIAGNOSIS — R53.83 OTHER FATIGUE: Primary | ICD-10-CM

## 2019-03-26 DIAGNOSIS — E66.9 OBESITY (BMI 30-39.9): ICD-10-CM

## 2019-03-26 PROCEDURE — 99205 OFFICE O/P NEW HI 60 MIN: CPT | Performed by: CLINICAL NURSE SPECIALIST

## 2019-03-26 PROCEDURE — 93000 ELECTROCARDIOGRAM COMPLETE: CPT | Performed by: CLINICAL NURSE SPECIALIST

## 2019-03-26 PROCEDURE — 93224 XTRNL ECG REC UP TO 48 HRS: CPT | Performed by: INTERNAL MEDICINE

## 2019-03-26 PROCEDURE — 3079F DIAST BP 80-89 MM HG: CPT | Performed by: CLINICAL NURSE SPECIALIST

## 2019-03-26 PROCEDURE — 3074F SYST BP LT 130 MM HG: CPT | Performed by: CLINICAL NURSE SPECIALIST

## 2019-03-26 RX ORDER — LISINOPRIL 20 MG/1
20 TABLET ORAL DAILY
COMMUNITY

## 2019-03-26 ASSESSMENT — ENCOUNTER SYMPTOMS
FEVER: 0
HEMOPTYSIS: 0
WEIGHT LOSS: 0
SUSPICIOUS LESIONS: 0
BRUISES/BLEEDS EASILY: 0
HEMATOCHEZIA: 0
WEIGHT GAIN: 0
CHILLS: 0
ALLERGIC/IMMUNOLOGIC COMMENTS: NO NEW FOOD ALLERGIES
COUGH: 0

## 2019-04-04 ENCOUNTER — TELEPHONE (OUTPATIENT)
Dept: CARDIOLOGY | Age: 54
End: 2019-04-04

## 2019-04-09 ENCOUNTER — OFFICE VISIT (OUTPATIENT)
Dept: FAMILY MEDICINE CLINIC | Facility: CLINIC | Age: 54
End: 2019-04-09

## 2019-04-09 VITALS
DIASTOLIC BLOOD PRESSURE: 80 MMHG | BODY MASS INDEX: 38.74 KG/M2 | RESPIRATION RATE: 16 BRPM | HEIGHT: 72 IN | TEMPERATURE: 99 F | WEIGHT: 286 LBS | SYSTOLIC BLOOD PRESSURE: 110 MMHG | HEART RATE: 72 BPM

## 2019-04-09 DIAGNOSIS — R59.0 AXILLARY LYMPHADENOPATHY: Primary | ICD-10-CM

## 2019-04-09 DIAGNOSIS — I49.3 PVCS (PREMATURE VENTRICULAR CONTRACTIONS): ICD-10-CM

## 2019-04-09 DIAGNOSIS — Z12.11 COLON CANCER SCREENING: ICD-10-CM

## 2019-04-09 PROBLEM — R06.83 SNORING: Status: ACTIVE | Noted: 2019-03-26

## 2019-04-09 PROCEDURE — 99213 OFFICE O/P EST LOW 20 MIN: CPT | Performed by: FAMILY MEDICINE

## 2019-04-09 RX ORDER — METOPROLOL SUCCINATE 50 MG/1
50 TABLET, EXTENDED RELEASE ORAL DAILY
Qty: 90 TABLET | Refills: 3 | Status: SHIPPED | OUTPATIENT
Start: 2019-04-09 | End: 2019-10-30

## 2019-04-09 NOTE — PROGRESS NOTES
Patient presents with:  Lump: follow up on lumps under arm pit,  still has lumps under arms and onlegs  Fatigue: still dealing with fatigue, getting sleep study done in two weeks     HPI:   Pk Bardales is a 48year old male who presents to the office f uL 0.03   Basophils Absolute      0.00 - 0.20 x10(3) uL 0.02   Immature Granulocyte Absolute      0.00 - 1.00 x10(3) uL 0.02   Neutrophils %      % 55.8   Lymphocytes %      % 32.5   Monocytes %      % 10.6   Eosinophils %      % 0.5   Basophils %      % 0 tablet;  Refill: 3        Deon Jeter M.D.   [facility] 3  04/09/19

## 2019-04-16 ENCOUNTER — TELEPHONE (OUTPATIENT)
Dept: FAMILY MEDICINE CLINIC | Facility: CLINIC | Age: 54
End: 2019-04-16

## 2019-04-16 ENCOUNTER — OFFICE VISIT (OUTPATIENT)
Dept: SURGERY | Facility: CLINIC | Age: 54
End: 2019-04-16

## 2019-04-16 VITALS — HEART RATE: 81 BPM | SYSTOLIC BLOOD PRESSURE: 103 MMHG | DIASTOLIC BLOOD PRESSURE: 69 MMHG

## 2019-04-16 DIAGNOSIS — R22.33 AXILLARY MASS, BILATERAL: Primary | ICD-10-CM

## 2019-04-16 DIAGNOSIS — K21.9 GASTROESOPHAGEAL REFLUX DISEASE, ESOPHAGITIS PRESENCE NOT SPECIFIED: Primary | ICD-10-CM

## 2019-04-16 DIAGNOSIS — R22.42 MASS OF LEFT THIGH: ICD-10-CM

## 2019-04-16 PROCEDURE — 99243 OFF/OP CNSLTJ NEW/EST LOW 30: CPT | Performed by: SURGERY

## 2019-04-16 NOTE — TELEPHONE ENCOUNTER
Left message for patient that we have placed referral at the request of [facility] for Dr. Karlene Salguero M.D.

## 2019-04-16 NOTE — TELEPHONE ENCOUNTER
Referral request Dr. Ena Souza M.D.,Gastroenterology    Patient seen by Dr. Vanessa Mckenna M.D. 4/9/19  Office notes from Dr. Vanessa Mckenna M.D. 4/9/19  Bowels - feels incomplete evacuation at times  Feels done, but there is still stool in the re

## 2019-04-16 NOTE — TELEPHONE ENCOUNTER
Received faX from [address] [place] [address] requesting a referral for pt.  (Diagnosis Gastro Esophageal Reflux) Form in Pat's INBOX

## 2019-04-16 NOTE — H&P
New Patient Visit Note       Active Problems      1. Axillary mass, bilateral    2.  Mass of left thigh        Chief Complaint   Patient presents with:  New Patient: NP, ref by PCP for bilat Axillary lymphadenopathy- needs biopsy on bilateral axillary and l Concerns:        Caffeine Concern: Yes          2-4 servings of coffee daily        Exercise: No       Current Outpatient Medications:   •  Metoprolol Succinate ER 50 MG Oral Tablet 24 Hr, Take 1 tablet (50 mg total) by mouth daily. , Disp: 90 tablet, Rfl: 103/69   Pulse 81   Physical Exam   Constitutional: He appears well-developed and well-nourished. No distress. Cardiovascular: Normal rate and regular rhythm. Pulmonary/Chest: Effort normal and breath sounds normal. No respiratory distress.  He has no w

## 2019-04-19 ENCOUNTER — OFFICE VISIT (OUTPATIENT)
Dept: SLEEP CENTER | Facility: HOSPITAL | Age: 54
End: 2019-04-19
Attending: INTERNAL MEDICINE
Payer: COMMERCIAL

## 2019-04-19 DIAGNOSIS — R00.2 PALPITATIONS: ICD-10-CM

## 2019-04-19 DIAGNOSIS — I49.3 PVC (PREMATURE VENTRICULAR CONTRACTION): ICD-10-CM

## 2019-04-19 DIAGNOSIS — R06.83 SNORING: ICD-10-CM

## 2019-04-19 PROCEDURE — 95810 POLYSOM 6/> YRS 4/> PARAM: CPT

## 2019-04-21 NOTE — PROCEDURES
[address] [address],[facility] [address]       Accredited by the [facility] ([facility])    PATIENT'S NAME:        Mode Terry  ATTENDING PHYSICIAN:   Ana Llamas M.D. REFERRING PHYSICIAN:   JERRY Lewis occupied 23% of total sleep time with a REM latency of 204.9 minutes. Sleep-disordered breathing was seen during the study. Sixty hypopneas were tallied. The overall AHI was 10.7 events per hour of sleep. There was a strong REM dominance.   The REM AH the potential dangers associated with reduced daytime vigilance. 5.   Consider further evaluation for restless legs syndrome. Thank you for your confidence in the [place] [facility].   If you have any questions, please feel free to contact us at [phone]

## 2019-04-23 ENCOUNTER — OFFICE VISIT (OUTPATIENT)
Dept: CARDIOLOGY | Age: 54
End: 2019-04-23

## 2019-04-23 VITALS
HEIGHT: 72 IN | SYSTOLIC BLOOD PRESSURE: 114 MMHG | BODY MASS INDEX: 39.82 KG/M2 | DIASTOLIC BLOOD PRESSURE: 78 MMHG | WEIGHT: 294 LBS | HEART RATE: 72 BPM

## 2019-04-23 DIAGNOSIS — R00.2 PALPITATIONS: ICD-10-CM

## 2019-04-23 DIAGNOSIS — R06.83 SNORING: ICD-10-CM

## 2019-04-23 DIAGNOSIS — I49.3 PVCS (PREMATURE VENTRICULAR CONTRACTIONS): Primary | ICD-10-CM

## 2019-04-23 DIAGNOSIS — I10 HYPERTENSION, BENIGN: ICD-10-CM

## 2019-04-23 PROCEDURE — 3074F SYST BP LT 130 MM HG: CPT | Performed by: CLINICAL NURSE SPECIALIST

## 2019-04-23 PROCEDURE — 3078F DIAST BP <80 MM HG: CPT | Performed by: CLINICAL NURSE SPECIALIST

## 2019-04-23 PROCEDURE — 99214 OFFICE O/P EST MOD 30 MIN: CPT | Performed by: CLINICAL NURSE SPECIALIST

## 2019-04-23 RX ORDER — METOPROLOL SUCCINATE 50 MG/1
50 TABLET, EXTENDED RELEASE ORAL DAILY
COMMUNITY
End: 2019-11-19 | Stop reason: DRUGHIGH

## 2019-04-23 ASSESSMENT — ENCOUNTER SYMPTOMS
HEMATOCHEZIA: 0
BRUISES/BLEEDS EASILY: 0
WEIGHT LOSS: 0
HEMOPTYSIS: 0
WEIGHT GAIN: 0
SUSPICIOUS LESIONS: 0
FEVER: 0
CHILLS: 0
COUGH: 0
ALLERGIC/IMMUNOLOGIC COMMENTS: NO NEW FOOD ALLERGIES

## 2019-04-24 ENCOUNTER — HOSPITAL ENCOUNTER (OUTPATIENT)
Dept: MAMMOGRAPHY | Facility: HOSPITAL | Age: 54
Discharge: HOME | End: 2019-04-24
Attending: SURGERY
Payer: COMMERCIAL

## 2019-04-24 DIAGNOSIS — R22.33 AXILLARY MASS, BILATERAL: ICD-10-CM

## 2019-04-24 PROCEDURE — 76882 US LMTD JT/FCL EVL NVASC XTR: CPT | Performed by: SURGERY

## 2019-04-29 ENCOUNTER — OFFICE VISIT (OUTPATIENT)
Dept: SURGERY | Facility: CLINIC | Age: 54
End: 2019-04-29

## 2019-04-29 VITALS
SYSTOLIC BLOOD PRESSURE: 120 MMHG | BODY MASS INDEX: 39 KG/M2 | WEIGHT: 290 LBS | HEART RATE: 76 BPM | TEMPERATURE: 98 F | DIASTOLIC BLOOD PRESSURE: 79 MMHG

## 2019-04-29 DIAGNOSIS — R22.33 AXILLARY MASS, BILATERAL: Primary | ICD-10-CM

## 2019-04-29 PROCEDURE — 99213 OFFICE O/P EST LOW 20 MIN: CPT | Performed by: SURGERY

## 2019-04-29 NOTE — PROGRESS NOTES
Follow Up Visit Note       Active Problems      1. Axillary mass, bilateral          Chief Complaint   Patient presents with: Follow - Up: US bilateral axilla results        History of Present Illness    Here for follow-up after his ultrasound.   US result History   Problem Relation Age of Onset   • Cancer Father 68        melanoma   • Depression Father    • Cancer Mother 58        lymphoma   • Heart Disorder Mother    • Depression Mother    • Asthma Mother    • Asthma Sister    • Hypertension Sister    • Ki Cardiovascular: Negative for chest pain, palpitations and leg swelling. Gastrointestinal: Negative for abdominal distention, abdominal pain, anal bleeding, blood in stool, constipation, diarrhea, nausea and vomiting.    Genitourinary: Negative for diffi

## 2019-05-01 ENCOUNTER — OFFICE VISIT (OUTPATIENT)
Dept: FAMILY MEDICINE CLINIC | Facility: CLINIC | Age: 54
End: 2019-05-01

## 2019-05-01 VITALS
HEIGHT: 72 IN | DIASTOLIC BLOOD PRESSURE: 70 MMHG | TEMPERATURE: 98 F | SYSTOLIC BLOOD PRESSURE: 122 MMHG | BODY MASS INDEX: 38.74 KG/M2 | RESPIRATION RATE: 16 BRPM | HEART RATE: 78 BPM | WEIGHT: 286 LBS

## 2019-05-01 DIAGNOSIS — R19.8 IRREGULAR BOWEL HABITS: ICD-10-CM

## 2019-05-01 DIAGNOSIS — I49.3 PVCS (PREMATURE VENTRICULAR CONTRACTIONS): ICD-10-CM

## 2019-05-01 DIAGNOSIS — I10 BENIGN ESSENTIAL HTN: Primary | ICD-10-CM

## 2019-05-01 DIAGNOSIS — R09.82 POST-NASAL DRIP: ICD-10-CM

## 2019-05-01 DIAGNOSIS — K21.9 GASTROESOPHAGEAL REFLUX DISEASE, ESOPHAGITIS PRESENCE NOT SPECIFIED: ICD-10-CM

## 2019-05-01 PROCEDURE — 99213 OFFICE O/P EST LOW 20 MIN: CPT | Performed by: FAMILY MEDICINE

## 2019-05-01 RX ORDER — LISINOPRIL 40 MG/1
TABLET ORAL
Qty: 90 TABLET | Refills: 3 | OUTPATIENT
Start: 2019-05-01

## 2019-05-01 RX ORDER — LISINOPRIL 40 MG/1
40 TABLET ORAL DAILY
Qty: 90 TABLET | Refills: 3 | Status: ON HOLD | OUTPATIENT
Start: 2019-05-01 | End: 2019-10-18

## 2019-05-01 RX ORDER — POLYETHYLENE GLYCOL-3350 AND ELECTROLYTES 236; 6.74; 5.86; 2.97; 22.74 G/274.31G; G/274.31G; G/274.31G; G/274.31G; G/274.31G
POWDER, FOR SOLUTION ORAL
Refills: 0 | COMMUNITY
Start: 2019-04-19 | End: 2019-10-12 | Stop reason: ALTCHOICE

## 2019-05-01 NOTE — PROGRESS NOTES
Patient presents with:  Lump: F/u on Lump on Bilateral Axillary, had US of Axillary done on 04-     HPI:   Anastasia Hernandez is a 48year old male who presents to the office for f/u swollen lymph nodes. First appeared earlier in March.   These persist thinning is identified. No masses seen. A pathologic process is not identified.        ASSESSMENT AND PLAN:     Armond Juan Danielwilver was seen in the office today:  had concerns including Lump (F/u on Lump on Bilateral Axillary, had US of Axillary done on 04-24

## 2019-05-02 ENCOUNTER — TELEPHONE (OUTPATIENT)
Dept: CARDIOLOGY | Age: 54
End: 2019-05-02

## 2019-05-05 ENCOUNTER — APPOINTMENT (OUTPATIENT)
Dept: CT IMAGING | Facility: HOSPITAL | Age: 54
End: 2019-05-05
Attending: EMERGENCY MEDICINE
Payer: COMMERCIAL

## 2019-05-05 ENCOUNTER — HOSPITAL ENCOUNTER (EMERGENCY)
Facility: HOSPITAL | Age: 54
Discharge: HOME | End: 2019-05-05
Attending: EMERGENCY MEDICINE
Payer: COMMERCIAL

## 2019-05-05 VITALS
WEIGHT: 285.94 LBS | RESPIRATION RATE: 20 BRPM | OXYGEN SATURATION: 96 % | HEIGHT: 72 IN | HEART RATE: 75 BPM | TEMPERATURE: 98 F | DIASTOLIC BLOOD PRESSURE: 79 MMHG | BODY MASS INDEX: 38.73 KG/M2 | SYSTOLIC BLOOD PRESSURE: 111 MMHG

## 2019-05-05 DIAGNOSIS — R10.9 ABDOMINAL PAIN OF UNKNOWN ETIOLOGY: Primary | ICD-10-CM

## 2019-05-05 PROCEDURE — 74177 CT ABD & PELVIS W/CONTRAST: CPT | Performed by: EMERGENCY MEDICINE

## 2019-05-05 PROCEDURE — 80053 COMPREHEN METABOLIC PANEL: CPT | Performed by: EMERGENCY MEDICINE

## 2019-05-05 PROCEDURE — 85025 COMPLETE CBC W/AUTO DIFF WBC: CPT | Performed by: EMERGENCY MEDICINE

## 2019-05-05 PROCEDURE — 99284 EMERGENCY DEPT VISIT MOD MDM: CPT

## 2019-05-05 PROCEDURE — 81003 URINALYSIS AUTO W/O SCOPE: CPT | Performed by: EMERGENCY MEDICINE

## 2019-05-05 PROCEDURE — 96374 THER/PROPH/DIAG INJ IV PUSH: CPT

## 2019-05-05 PROCEDURE — 83690 ASSAY OF LIPASE: CPT | Performed by: EMERGENCY MEDICINE

## 2019-05-05 RX ORDER — MULTIVIT-MIN/IRON FUM/FOLIC AC 7.5 MG-4
1 TABLET ORAL DAILY
COMMUNITY

## 2019-05-05 RX ORDER — KETOROLAC TROMETHAMINE 30 MG/ML
15 INJECTION, SOLUTION INTRAMUSCULAR; INTRAVENOUS ONCE
Status: COMPLETED | OUTPATIENT
Start: 2019-05-05 | End: 2019-05-05

## 2019-05-05 RX ORDER — TRAMADOL HYDROCHLORIDE 50 MG/1
TABLET ORAL EVERY 6 HOURS PRN
Qty: 10 TABLET | Refills: 0 | Status: SHIPPED | OUTPATIENT
Start: 2019-05-05 | End: 2019-05-12

## 2019-05-05 NOTE — ED INITIAL ASSESSMENT (HPI)
Pt presents to the ED with complaints of lower abdominal discomfort since last night. Pt states he has been having some abdominal issues and has a colonoscopy scheduled this week, but this pain is worse today. Pt states he did have a bowel movement today.

## 2019-05-05 NOTE — ED PROVIDER NOTES
Patient Seen in: [facility] Emergency Department    History   Patient presents with:  Abdomen/Flank Pain (GI/)    Stated Complaint: lower abd pain    HPI    Luca Needle is a pleasant 61-year-old male presenting to the emergency department for abdominal 1010]   /77   Pulse 91   Resp 18   Temp 98.6 °F (37 °C)   Temp src Temporal   SpO2 97 %   O2 Device None (Room air)       Current:/77   Pulse 91   Temp 98.6 °F (37 °C) (Temporal)   Resp 18   Ht 182.9 cm (6')   Wt 129.7 kg   SpO2 97%   BMI 38.78 days          Medications Prescribed:  Current Discharge Medication List    START taking these medications    traMADol HCl 50 MG Oral Tab  Take 1-2 tablets ( mg total) by mouth every 6 (six) hours as needed for Pain.   Qty: 10 tablet Refills: 0

## 2019-05-06 ENCOUNTER — TELEPHONE (OUTPATIENT)
Dept: FAMILY MEDICINE CLINIC | Facility: CLINIC | Age: 54
End: 2019-05-06

## 2019-05-06 NOTE — TELEPHONE ENCOUNTER
Attempted to contact Arianna Jean Baptiste for a courtesy call from his ER visit on 05-, pt did not answer, left voicemail to give us a call back if a Follow-up appointment is needed.

## 2019-05-09 PROBLEM — K21.9 GERD (GASTROESOPHAGEAL REFLUX DISEASE): Status: ACTIVE | Noted: 2019-05-09

## 2019-05-09 PROBLEM — K63.5 COLON POLYP: Status: ACTIVE | Noted: 2019-05-09

## 2019-05-09 PROBLEM — Z12.11 SPECIAL SCREENING FOR MALIGNANT NEOPLASMS, COLON: Status: ACTIVE | Noted: 2019-05-09

## 2019-05-09 PROCEDURE — 88305 TISSUE EXAM BY PATHOLOGIST: CPT | Performed by: STUDENT IN AN ORGANIZED HEALTH CARE EDUCATION/TRAINING PROGRAM

## 2019-10-12 ENCOUNTER — HOSPITAL ENCOUNTER (OUTPATIENT)
Age: 54
Discharge: HOME | End: 2019-10-12
Attending: FAMILY MEDICINE
Payer: COMMERCIAL

## 2019-10-12 ENCOUNTER — APPOINTMENT (OUTPATIENT)
Dept: GENERAL RADIOLOGY | Age: 54
End: 2019-10-12
Attending: FAMILY MEDICINE
Payer: COMMERCIAL

## 2019-10-12 VITALS
RESPIRATION RATE: 18 BRPM | TEMPERATURE: 98 F | HEART RATE: 88 BPM | SYSTOLIC BLOOD PRESSURE: 111 MMHG | OXYGEN SATURATION: 95 % | DIASTOLIC BLOOD PRESSURE: 81 MMHG

## 2019-10-12 DIAGNOSIS — J01.90 SUBACUTE SINUSITIS, UNSPECIFIED LOCATION: Primary | ICD-10-CM

## 2019-10-12 DIAGNOSIS — R10.9 ABDOMINAL PAIN, UNSPECIFIED ABDOMINAL LOCATION: ICD-10-CM

## 2019-10-12 PROCEDURE — 99215 OFFICE O/P EST HI 40 MIN: CPT

## 2019-10-12 PROCEDURE — 71046 X-RAY EXAM CHEST 2 VIEWS: CPT | Performed by: FAMILY MEDICINE

## 2019-10-12 PROCEDURE — 84484 ASSAY OF TROPONIN QUANT: CPT

## 2019-10-12 PROCEDURE — 93010 ELECTROCARDIOGRAM REPORT: CPT | Performed by: INTERNAL MEDICINE

## 2019-10-12 PROCEDURE — 93010 ELECTROCARDIOGRAM REPORT: CPT

## 2019-10-12 PROCEDURE — 36415 COLL VENOUS BLD VENIPUNCTURE: CPT

## 2019-10-12 PROCEDURE — 80047 BASIC METABLC PNL IONIZED CA: CPT

## 2019-10-12 PROCEDURE — 93005 ELECTROCARDIOGRAM TRACING: CPT

## 2019-10-12 PROCEDURE — 85025 COMPLETE CBC W/AUTO DIFF WBC: CPT | Performed by: FAMILY MEDICINE

## 2019-10-12 RX ORDER — PANTOPRAZOLE SODIUM 40 MG/1
40 TABLET, DELAYED RELEASE ORAL DAILY
Qty: 30 TABLET | Refills: 0 | Status: SHIPPED | OUTPATIENT
Start: 2019-10-12 | End: 2019-10-30

## 2019-10-12 RX ORDER — AMOXICILLIN AND CLAVULANATE POTASSIUM 875; 125 MG/1; MG/1
1 TABLET, FILM COATED ORAL 2 TIMES DAILY
Qty: 20 TABLET | Refills: 0 | Status: SHIPPED | OUTPATIENT
Start: 2019-10-12 | End: 2019-10-22

## 2019-10-12 NOTE — ED INITIAL ASSESSMENT (HPI)
C/o sinus congestion and pressure for few weeks. Feeling pressure behind eyes. Also with 3 week hx epigastric discomfort.   Reports as feeling like a muscle spasm, with referral up into chest.  He does have a hx of PVC's , but does not feel like it his

## 2019-10-12 NOTE — ED PROVIDER NOTES
Patient Seen in: [facility]      History   Patient presents with:  Sinus Problem  Abdomen/Flank Pain (GI/)    Stated Complaint: possible sinus infection and stomache pain x 3 weeks     HPI    61-year-old male presents wit disturbance     Daily   • Wears glasses     Since late teens              Past Surgical History:   Procedure Laterality Date   • COLONOSCOPY                  Family history reviewed with patient/caregiver and is not pertinent to presenting problem.     Soci POCT CBC     EKG    Rate, intervals and axes as noted on EKG Report. Rate: 90  Rhythm: Sinus Rhythm  Reading: Normal sinus rhythm with no ST-T wave changes. MDM     Patient with sinus symptoms for the past several weeks.   Finding co

## 2019-10-17 ENCOUNTER — HOSPITAL ENCOUNTER (OUTPATIENT)
Facility: HOSPITAL | Age: 54
Setting detail: OBSERVATION
Discharge: HOME | End: 2019-10-18
Attending: EMERGENCY MEDICINE | Admitting: HOSPITALIST
Payer: COMMERCIAL

## 2019-10-17 ENCOUNTER — APPOINTMENT (OUTPATIENT)
Dept: GENERAL RADIOLOGY | Facility: HOSPITAL | Age: 54
End: 2019-10-17
Attending: EMERGENCY MEDICINE
Payer: COMMERCIAL

## 2019-10-17 DIAGNOSIS — R07.9 CHEST PAIN OF UNCERTAIN ETIOLOGY: Primary | ICD-10-CM

## 2019-10-17 PROBLEM — E87.2 METABOLIC ACIDOSIS: Status: ACTIVE | Noted: 2019-10-17

## 2019-10-17 PROBLEM — E87.1 HYPONATREMIA: Status: ACTIVE | Noted: 2019-10-17

## 2019-10-17 PROBLEM — D69.6 THROMBOCYTOPENIA (HCC): Status: ACTIVE | Noted: 2019-10-17

## 2019-10-17 PROBLEM — D69.6 THROMBOCYTOPENIA: Status: ACTIVE | Noted: 2019-10-17

## 2019-10-17 PROBLEM — E87.20 METABOLIC ACIDOSIS: Status: ACTIVE | Noted: 2019-10-17

## 2019-10-17 PROCEDURE — 99220 INITIAL OBSERVATION CARE,LEVL III: CPT | Performed by: HOSPITALIST

## 2019-10-17 PROCEDURE — 71045 X-RAY EXAM CHEST 1 VIEW: CPT | Performed by: EMERGENCY MEDICINE

## 2019-10-17 RX ORDER — METOCLOPRAMIDE HYDROCHLORIDE 5 MG/ML
10 INJECTION INTRAMUSCULAR; INTRAVENOUS EVERY 8 HOURS PRN
Status: DISCONTINUED | OUTPATIENT
Start: 2019-10-17 | End: 2019-10-18

## 2019-10-17 RX ORDER — ONDANSETRON 2 MG/ML
4 INJECTION INTRAMUSCULAR; INTRAVENOUS EVERY 6 HOURS PRN
Status: DISCONTINUED | OUTPATIENT
Start: 2019-10-17 | End: 2019-10-18

## 2019-10-17 RX ORDER — ASPIRIN 81 MG/1
324 TABLET, CHEWABLE ORAL ONCE
Status: COMPLETED | OUTPATIENT
Start: 2019-10-17 | End: 2019-10-17

## 2019-10-17 RX ORDER — ENOXAPARIN SODIUM 100 MG/ML
40 INJECTION SUBCUTANEOUS DAILY
Status: DISCONTINUED | OUTPATIENT
Start: 2019-10-18 | End: 2019-10-18

## 2019-10-17 RX ORDER — ONDANSETRON 2 MG/ML
4 INJECTION INTRAMUSCULAR; INTRAVENOUS EVERY 4 HOURS PRN
Status: DISCONTINUED | OUTPATIENT
Start: 2019-10-17 | End: 2019-10-17

## 2019-10-17 RX ORDER — DOCUSATE SODIUM 100 MG/1
100 CAPSULE, LIQUID FILLED ORAL 2 TIMES DAILY
Status: DISCONTINUED | OUTPATIENT
Start: 2019-10-17 | End: 2019-10-18

## 2019-10-17 RX ORDER — ASPIRIN 325 MG
325 TABLET ORAL DAILY
Status: DISCONTINUED | OUTPATIENT
Start: 2019-10-17 | End: 2019-10-18

## 2019-10-17 RX ORDER — NITROGLYCERIN 0.4 MG/1
0.4 TABLET SUBLINGUAL EVERY 5 MIN PRN
Status: DISCONTINUED | OUTPATIENT
Start: 2019-10-17 | End: 2019-10-18

## 2019-10-17 RX ORDER — SODIUM PHOSPHATE, DIBASIC AND SODIUM PHOSPHATE, MONOBASIC 7; 19 G/133ML; G/133ML
1 ENEMA RECTAL ONCE AS NEEDED
Status: DISCONTINUED | OUTPATIENT
Start: 2019-10-17 | End: 2019-10-18

## 2019-10-17 RX ORDER — SODIUM CHLORIDE 9 MG/ML
INJECTION, SOLUTION INTRAVENOUS CONTINUOUS
Status: ACTIVE | OUTPATIENT
Start: 2019-10-17 | End: 2019-10-18

## 2019-10-17 RX ORDER — KETOROLAC TROMETHAMINE 30 MG/ML
30 INJECTION, SOLUTION INTRAMUSCULAR; INTRAVENOUS ONCE
Status: COMPLETED | OUTPATIENT
Start: 2019-10-17 | End: 2019-10-17

## 2019-10-17 RX ORDER — ACETAMINOPHEN 325 MG/1
650 TABLET ORAL EVERY 6 HOURS PRN
Status: DISCONTINUED | OUTPATIENT
Start: 2019-10-17 | End: 2019-10-18

## 2019-10-17 RX ORDER — BISACODYL 10 MG
10 SUPPOSITORY, RECTAL RECTAL
Status: DISCONTINUED | OUTPATIENT
Start: 2019-10-17 | End: 2019-10-18

## 2019-10-17 RX ORDER — POLYETHYLENE GLYCOL 3350 17 G/17G
17 POWDER, FOR SOLUTION ORAL DAILY PRN
Status: DISCONTINUED | OUTPATIENT
Start: 2019-10-17 | End: 2019-10-18

## 2019-10-18 ENCOUNTER — APPOINTMENT (OUTPATIENT)
Dept: CV DIAGNOSTICS | Facility: HOSPITAL | Age: 54
End: 2019-10-18
Attending: HOSPITALIST
Payer: COMMERCIAL

## 2019-10-18 ENCOUNTER — APPOINTMENT (OUTPATIENT)
Dept: CV DIAGNOSTICS | Facility: HOSPITAL | Age: 54
End: 2019-10-18
Attending: INTERNAL MEDICINE
Payer: COMMERCIAL

## 2019-10-18 VITALS
WEIGHT: 267.63 LBS | RESPIRATION RATE: 18 BRPM | OXYGEN SATURATION: 96 % | BODY MASS INDEX: 36.25 KG/M2 | TEMPERATURE: 98 F | DIASTOLIC BLOOD PRESSURE: 71 MMHG | HEART RATE: 87 BPM | HEIGHT: 72 IN | SYSTOLIC BLOOD PRESSURE: 97 MMHG

## 2019-10-18 PROCEDURE — 93017 CV STRESS TEST TRACING ONLY: CPT | Performed by: INTERNAL MEDICINE

## 2019-10-18 PROCEDURE — 93018 CV STRESS TEST I&R ONLY: CPT | Performed by: INTERNAL MEDICINE

## 2019-10-18 PROCEDURE — 93350 STRESS TTE ONLY: CPT | Performed by: INTERNAL MEDICINE

## 2019-10-18 PROCEDURE — 93306 TTE W/DOPPLER COMPLETE: CPT | Performed by: HOSPITALIST

## 2019-10-18 PROCEDURE — 99245 OFF/OP CONSLTJ NEW/EST HI 55: CPT | Performed by: INTERNAL MEDICINE

## 2019-10-18 PROCEDURE — 99217 OBSERVATION CARE DISCHARGE: CPT | Performed by: HOSPITALIST

## 2019-10-18 RX ORDER — LISINOPRIL 40 MG/1
40 TABLET ORAL DAILY
Status: DISCONTINUED | OUTPATIENT
Start: 2019-10-18 | End: 2019-10-18

## 2019-10-18 RX ORDER — LISINOPRIL 20 MG/1
20 TABLET ORAL DAILY
Status: DISCONTINUED | OUTPATIENT
Start: 2019-10-19 | End: 2019-10-18

## 2019-10-18 RX ORDER — PANTOPRAZOLE SODIUM 20 MG/1
20 TABLET, DELAYED RELEASE ORAL DAILY
Qty: 30 TABLET | Refills: 1 | Status: SHIPPED | OUTPATIENT
Start: 2019-10-18 | End: 2019-10-18

## 2019-10-18 RX ORDER — PANTOPRAZOLE SODIUM 40 MG/1
40 TABLET, DELAYED RELEASE ORAL DAILY
Status: DISCONTINUED | OUTPATIENT
Start: 2019-10-18 | End: 2019-10-18

## 2019-10-18 RX ORDER — AMOXICILLIN AND CLAVULANATE POTASSIUM 875; 125 MG/1; MG/1
1 TABLET, FILM COATED ORAL 2 TIMES DAILY
Status: DISCONTINUED | OUTPATIENT
Start: 2019-10-18 | End: 2019-10-18

## 2019-10-18 RX ORDER — LISINOPRIL 20 MG/1
20 TABLET ORAL DAILY
Qty: 30 TABLET | Refills: 0 | Status: SHIPPED | OUTPATIENT
Start: 2019-10-19 | End: 2019-10-30

## 2019-10-18 RX ORDER — METOPROLOL SUCCINATE 25 MG/1
25 TABLET, EXTENDED RELEASE ORAL
Status: DISCONTINUED | OUTPATIENT
Start: 2019-10-18 | End: 2019-10-18

## 2019-10-18 RX ORDER — SODIUM CHLORIDE 9 MG/ML
INJECTION, SOLUTION INTRAVENOUS ONCE
Status: COMPLETED | OUTPATIENT
Start: 2019-10-18 | End: 2019-10-18

## 2019-10-18 RX ORDER — CETIRIZINE HYDROCHLORIDE 10 MG/1
10 TABLET ORAL DAILY
Status: DISCONTINUED | OUTPATIENT
Start: 2019-10-18 | End: 2019-10-18

## 2019-10-18 RX ORDER — ASPIRIN 81 MG/1
81 TABLET, CHEWABLE ORAL DAILY
Qty: 30 TABLET | Refills: 11 | Status: SHIPPED | COMMUNITY
Start: 2019-10-18 | End: 2022-01-14

## 2019-10-18 RX ORDER — FLUTICASONE PROPIONATE 50 MCG
2 SPRAY, SUSPENSION (ML) NASAL DAILY
Status: DISCONTINUED | OUTPATIENT
Start: 2019-10-18 | End: 2019-10-18

## 2019-10-18 RX ORDER — POTASSIUM CHLORIDE 20 MEQ/1
40 TABLET, EXTENDED RELEASE ORAL ONCE
Status: COMPLETED | OUTPATIENT
Start: 2019-10-18 | End: 2019-10-18

## 2019-10-18 NOTE — ED PROVIDER NOTES
Patient Seen in: [facility] Emergency Department      History   Patient presents with:  Chest Pain Angina (cardiovascular)    Stated Complaint: chest pain     HPI  This is a 63-year-old male who presents with complaints of chest pain,.   He states th Smokeless tobacco: Never Used    Alcohol use: Yes      Alcohol/week: 2.0 standard drinks      Types: 2 Cans of beer per week      Comment: Rarely    Drug use:  No             Review of Systems    Positive for stated complaint: chest pain   Other systems are for panel order CBC WITH DIFFERENTIAL WITH PLATELET.   Procedure                               Abnormality         Status                     ---------                               -----------         ------                     CBC W/ DIFFERENTIAL[[id number] (CPT=71045)  TECHNIQUE:  AP chest radiograph was obtained. COMPARISON:  [place], XR, XR CHEST PA + LAT CHEST (CPT=71046), 10/12/2019, 13:27. [facility] XR, XR CHEST PA + LAT CHEST (MEO=36818), 3/22/2019, 10:18.   INDICATIONS:  chest pain  PATIENT ST Hyponatremia E87.1 82/43/6163 Yes    Metabolic acidosis L60.5 35/72/3695 Yes    [facility]ocyt[facility] D69.6 10/17/2019 Yes

## 2019-10-18 NOTE — PLAN OF CARE
Assumed patient care at 0720 this AM. Patient resting in chair, A&O x4, up ad beau to chair. SPO2 >94% on RA, no c/o SOB/WOOD. NSR, patient denies chest pain, dizziness, left arm numbness at this time. Troponin (-) this AM (negative x4 total).  POC reviewed w

## 2019-10-18 NOTE — PLAN OF CARE
Stress echo results are normal.Ok from a cardiology standpoint to be discharged to home today. Follow up Dr. Brittany Chun as previously directed.  DEcrase ACE and start baby ASA daily, along with PPI.   2:13 PM

## 2019-10-18 NOTE — ED INITIAL ASSESSMENT (HPI)
Pt to ED with complaints of intermittent chest pressure for past 2 weeks with new onset left arm numbness starting today.

## 2019-10-18 NOTE — PROGRESS NOTES
10/18/19 0421 10/18/19 0423 10/18/19 0424   Vital Signs   Pulse 71 77 88   Heart Rate Source Monitor Monitor Monitor   Resp 18  --   --    Respiratory Quality Normal  --   --    /68 102/68 94/67   BP Location Right arm Right arm Right arm   BP Met

## 2019-10-18 NOTE — H&P
[facility]  History and Physical     Anastasia Hernandez Patient Status:  Emergency    1965 MRN [id number]   Location [facility] Attending Sotero Ryan MD   Hosp Day # 0 PCP Negrita Boland MD     Chief Complain use of about 2.0 standard drinks of alcohol per week. He reports that he does not use drugs.     Family History:   Family History   Problem Relation Age of Onset   • Cancer Father 68        melanoma   • Depression Father    • Cancer Mother 58        lymphom /76   Pulse 72   Temp 98 °F (36.7 °C) (Temporal)   Resp 20   Ht 6' (1.829 m)   Wt 267 lb (121.1 kg)   SpO2 96%   BMI 36.21 kg/m²   General: No acute distress. Alert and oriented x 3. HEENT: Normocephalic atraumatic. Moist mucous membranes.  EOM-I. Lovenox  · CODE status: Full  · Becker: None    Plan of care discussed with patient at bedside.     Magi Razo DO  10/17/2019

## 2019-10-18 NOTE — PROGRESS NOTES
10/17/19 2321 10/17/19 2323 10/17/19 2324   Vital Signs   /74 110/76 98/76   BP Location Right arm Right arm Right arm   BP Method Automatic Automatic Automatic   Patient Position Lying Sitting Standing     Pt c/o intermittent dizziness.  MD notifi

## 2019-10-18 NOTE — PROGRESS NOTES
Seen and examined; d/c later if stress test negative and if cleared by cards. Echo  Pending as well. Consider outpatient MRI of cervical spine to look for myelopathy. Patient understands and agrees with plan.

## 2019-10-18 NOTE — PLAN OF CARE
NURSING ADMISSION NOTE      Patient admitted via Cart  Oriented to room. Safety precautions initiated. Bed in low position. Call light in reach. Admission navigator completed. Pt AOx4. On RA, denies SOB. NSR on tele.  Pt denies chest pain at this

## 2019-10-18 NOTE — PLAN OF CARE
Patient is able to discharge. Discharge instructions explained, to patient and wife at bedside. Patient verbalized understanding. Patient was escorted via wheel chair to the lobby.

## 2019-10-18 NOTE — PROGRESS NOTES
PRELIMINARY CARDIODIAGNOSTICS REPORT    No ST segment changes noted with exercise. Pt c/o dizziness throughout the entire test. Rare PVCs. Pt walked for 9:11 on Stone protocol achieving MHR of 148 (88% APMHR) and peak BP of 162/82.   Test was terminated d

## 2019-10-18 NOTE — CONSULTS
[facility] Cardiology  Report of Consultation    Jeremyberto Gabrielgi Patient Status:  Observation    1965 MRN [id number]   [facility] 2NE-A Attending Kade Dutta MD   Hosp Day # 0 PCP Brody Logan MD     Reason for Co profile    Chest x-ray -no acute changes. Lung fields clear. EKG: Sinus rhythm 73 bpm, normal EKG, no ischemia and no pathological Q waves. Normal axis and normal intervals. Two normal EKGs since admission.     Telemetry: sinus stable    Last nuclea uncle had CABG early in life but not parents or siblings. Social history: patient denies smoking. He has office work. He denies smoking alcohol drug abuse. reports that he has never smoked.  He has never used smokeless tobacco. He reports current al significant weight changes. Obesity  Eyes: Patient denies significant visual changes. Ears, Nose, Mouth, Throat:  Negative for any new hearing loss. No sore throat. No nasal congestion.   Cardiovascular: see HPI -intermittent atypical chest pain over last Without clubbing, cyanosis or edema. Peripheral pulses are 2+. Neurologic: Alert and oriented x3. Cranial nerves intact. Normal sensation and motor function. No focal deficits.   Musculoskeletal: normal range of motion, normal muscle strength, no joint e Lesley De La Rosa MD on 10/17/2019 at 20:38            Labs:     Lab Results   Component Value Date    INR 0.96 10/24/2018    INR 0.93 06/21/2018    INR 0.89 12/27/2016     Lab Results   Component Value Date    LDL 81 10/18/2019    HDL 45 10/18/2019    TRIG changed to Protonix. Plan:  -Recheck troponin now and if negative -schedule stress echo this morning. We will use Definity contrast agent if needed to better delineate endocardium.   Chest pain is atypical  -Echo was basically normal study few years ago

## 2019-10-19 NOTE — DISCHARGE SUMMARY
[facility]  DISCHARGE SUMMARY     Yasir Fox Patient Status:  Observation    1965 MRN [id number]   [facility] 2NE-A Attending No att. providers found   Hosp Day # 0 PCP Ubaldo Harper MD     Date of Admission: 10/17/2019 30 tablet  Refills:  11        CHANGE how you take these medications      Instructions Prescription details   Amoxicillin-Pot Clavulanate 875-125 MG Tabs  Commonly known as:  AUGMENTIN  What changed:  additional instructions      Take 1 tablet by mouth 2 Ask your nurse or doctor about these medications  · aspirin 81 MG Chew         [place]    Follow-up appointment:   Louise Armenta MD  [address] [facility] [address] [facility] [place] [phone]    In 1 week      Aleshia Nelson MD  [address] [facility]

## 2019-10-21 ENCOUNTER — PATIENT OUTREACH (OUTPATIENT)
Dept: CASE MANAGEMENT | Age: 54
End: 2019-10-21

## 2019-10-21 DIAGNOSIS — Z02.9 ENCOUNTERS FOR UNSPECIFIED ADMINISTRATIVE PURPOSE: ICD-10-CM

## 2019-10-21 NOTE — PROGRESS NOTES
NCM sent [employer] message requesting a call back with a condition update and a request to call the [facility] clinic at [phone] to confirm [facility] appointment on 10/24/19 at 4:00 pm.

## 2019-10-29 ENCOUNTER — TELEPHONE (OUTPATIENT)
Dept: FAMILY MEDICINE CLINIC | Facility: CLINIC | Age: 54
End: 2019-10-29

## 2019-10-29 NOTE — TELEPHONE ENCOUNTER
Attempted to call patient twice - phone did not ring. Will plan to see patient at scheduled appointment this afternoon.

## 2019-10-30 ENCOUNTER — OFFICE VISIT (OUTPATIENT)
Dept: FAMILY MEDICINE CLINIC | Facility: CLINIC | Age: 54
End: 2019-10-30

## 2019-10-30 VITALS
HEIGHT: 73 IN | HEART RATE: 76 BPM | DIASTOLIC BLOOD PRESSURE: 88 MMHG | RESPIRATION RATE: 16 BRPM | BODY MASS INDEX: 37.11 KG/M2 | WEIGHT: 280 LBS | SYSTOLIC BLOOD PRESSURE: 120 MMHG | TEMPERATURE: 98 F

## 2019-10-30 DIAGNOSIS — I10 BENIGN ESSENTIAL HTN: ICD-10-CM

## 2019-10-30 DIAGNOSIS — K21.9 GASTROESOPHAGEAL REFLUX DISEASE, ESOPHAGITIS PRESENCE NOT SPECIFIED: ICD-10-CM

## 2019-10-30 DIAGNOSIS — R09.82 PND (POST-NASAL DRIP): ICD-10-CM

## 2019-10-30 DIAGNOSIS — I49.3 PVCS (PREMATURE VENTRICULAR CONTRACTIONS): ICD-10-CM

## 2019-10-30 DIAGNOSIS — R20.2 NUMBNESS AND TINGLING IN LEFT ARM: ICD-10-CM

## 2019-10-30 DIAGNOSIS — R20.0 NUMBNESS AND TINGLING IN LEFT ARM: ICD-10-CM

## 2019-10-30 DIAGNOSIS — R07.89 CHEST TIGHTNESS: Primary | ICD-10-CM

## 2019-10-30 DIAGNOSIS — R42 DIZZINESS OF UNKNOWN CAUSE: ICD-10-CM

## 2019-10-30 PROCEDURE — 99495 TRANSJ CARE MGMT MOD F2F 14D: CPT | Performed by: FAMILY MEDICINE

## 2019-10-30 RX ORDER — METOPROLOL SUCCINATE 25 MG/1
25 TABLET, EXTENDED RELEASE ORAL DAILY
Qty: 90 TABLET | Refills: 1 | Status: SHIPPED | OUTPATIENT
Start: 2019-10-30 | End: 2020-04-21

## 2019-10-30 RX ORDER — LISINOPRIL 20 MG/1
20 TABLET ORAL DAILY
Qty: 30 TABLET | Refills: 0 | Status: SHIPPED | OUTPATIENT
Start: 2019-10-30 | End: 2019-11-13

## 2019-10-30 RX ORDER — POTASSIUM CHLORIDE 750 MG/1
10 TABLET, FILM COATED, EXTENDED RELEASE ORAL
Qty: 90 TABLET | Refills: 3 | Status: SHIPPED | OUTPATIENT
Start: 2019-10-30 | End: 2020-08-26

## 2019-10-30 RX ORDER — IPRATROPIUM BROMIDE 21 UG/1
2 SPRAY, METERED NASAL EVERY 12 HOURS
Qty: 1 BOTTLE | Refills: 1 | Status: SHIPPED | OUTPATIENT
Start: 2019-10-30 | End: 2019-11-22

## 2019-10-30 RX ORDER — PANTOPRAZOLE SODIUM 20 MG/1
20 TABLET, DELAYED RELEASE ORAL DAILY
Qty: 30 TABLET | Refills: 2 | Status: SHIPPED | OUTPATIENT
Start: 2019-10-30 | End: 2020-01-22

## 2019-10-30 NOTE — PROGRESS NOTES
Patient presents with:  ER F/U: Pt was seen in ER for chest tightness on 10/18/19    HPI:    Mee Timmons is a 48year old male here today for hospital follow up.    He was discharged from Inpatient hospital, [facility] to Home   Admission Date: 10/ and cardiologist thought this could be thoracic related causing this chest pain. Also possibly contributing to the dizziness. Still feeling chest tightness in bilateral upper chest.    Still feeling dizziness.   Vertigo feeling but without the room sp in his mother; Hypertension in his sister and sister; Kidney Disease in his maternal grandmother; B[place] Hatchet in his paternal grandfather. He  reports that he has never smoked.  He has never used smokeless tobacco. He reports current alcohol use of about 2 nerves are intact, motor and sensory are grossly intact. Arms strength equal bilateral.  Normal monofilament and vibratory sense bilaterally. Cranial nerves normal.     CXR: FINDINGS:  Cardiac size and pulmonary vasculature are within normal limits.  No pl 24 Hr; Take 1 tablet (25 mg total) by mouth daily. Dispense: 90 tablet; Refill: 1    6. Gastroesophageal reflux disease, esophagitis presence not specified  Stay on PPI  Will eventually wean off this med.   Dose changed to 20mg from 40mg  - Pantoprazole So

## 2019-11-01 NOTE — PROGRESS NOTES
Multiple attempts to reach pt and messages left with no return call. Pt went in for HFU appt with PCP on 10/30/19. Encounter closing.

## 2019-11-12 RX ORDER — POTASSIUM CHLORIDE 750 MG/1
TABLET, FILM COATED, EXTENDED RELEASE ORAL
Qty: 90 TABLET | Refills: 1 | Status: SHIPPED | OUTPATIENT
Start: 2019-11-12 | End: 2020-07-17

## 2019-11-13 ENCOUNTER — TELEPHONE (OUTPATIENT)
Dept: FAMILY MEDICINE CLINIC | Facility: CLINIC | Age: 54
End: 2019-11-13

## 2019-11-13 DIAGNOSIS — I10 BENIGN ESSENTIAL HTN: ICD-10-CM

## 2019-11-13 RX ORDER — LISINOPRIL 20 MG/1
20 TABLET ORAL DAILY
Qty: 90 TABLET | Refills: 1 | Status: SHIPPED | OUTPATIENT
Start: 2019-11-13 | End: 2020-05-11

## 2019-11-19 ENCOUNTER — OFFICE VISIT (OUTPATIENT)
Dept: CARDIOLOGY | Age: 54
End: 2019-11-19

## 2019-11-19 VITALS
BODY MASS INDEX: 37.65 KG/M2 | WEIGHT: 278 LBS | DIASTOLIC BLOOD PRESSURE: 86 MMHG | SYSTOLIC BLOOD PRESSURE: 120 MMHG | HEIGHT: 72 IN | HEART RATE: 82 BPM

## 2019-11-19 DIAGNOSIS — I49.3 PVC (PREMATURE VENTRICULAR CONTRACTION): Primary | ICD-10-CM

## 2019-11-19 PROCEDURE — 3074F SYST BP LT 130 MM HG: CPT | Performed by: INTERNAL MEDICINE

## 2019-11-19 PROCEDURE — 99215 OFFICE O/P EST HI 40 MIN: CPT | Performed by: INTERNAL MEDICINE

## 2019-11-19 PROCEDURE — 3079F DIAST BP 80-89 MM HG: CPT | Performed by: INTERNAL MEDICINE

## 2019-11-19 ASSESSMENT — ENCOUNTER SYMPTOMS
BRUISES/BLEEDS EASILY: 0
WEIGHT GAIN: 0
HEMOPTYSIS: 0
FEVER: 0
COUGH: 0
HEMATOCHEZIA: 0
ALLERGIC/IMMUNOLOGIC COMMENTS: NO NEW FOOD ALLERGIES
SUSPICIOUS LESIONS: 0
WEIGHT LOSS: 0
CHILLS: 0

## 2019-11-19 ASSESSMENT — PATIENT HEALTH QUESTIONNAIRE - PHQ9
2. FEELING DOWN, DEPRESSED OR HOPELESS: NOT AT ALL
1. LITTLE INTEREST OR PLEASURE IN DOING THINGS: NOT AT ALL
SUM OF ALL RESPONSES TO PHQ9 QUESTIONS 1 AND 2: 0
SUM OF ALL RESPONSES TO PHQ9 QUESTIONS 1 AND 2: 0

## 2019-11-22 ENCOUNTER — OFFICE VISIT (OUTPATIENT)
Dept: FAMILY MEDICINE CLINIC | Facility: CLINIC | Age: 54
End: 2019-11-22

## 2019-11-22 ENCOUNTER — TELEPHONE (OUTPATIENT)
Dept: FAMILY MEDICINE CLINIC | Facility: CLINIC | Age: 54
End: 2019-11-22

## 2019-11-22 VITALS
WEIGHT: 280 LBS | OXYGEN SATURATION: 98 % | HEIGHT: 73 IN | HEART RATE: 79 BPM | TEMPERATURE: 98 F | SYSTOLIC BLOOD PRESSURE: 122 MMHG | RESPIRATION RATE: 18 BRPM | DIASTOLIC BLOOD PRESSURE: 80 MMHG | BODY MASS INDEX: 37.11 KG/M2

## 2019-11-22 DIAGNOSIS — J01.40 ACUTE PANSINUSITIS, RECURRENCE NOT SPECIFIED: Primary | ICD-10-CM

## 2019-11-22 DIAGNOSIS — R09.82 PND (POST-NASAL DRIP): ICD-10-CM

## 2019-11-22 PROCEDURE — 99213 OFFICE O/P EST LOW 20 MIN: CPT | Performed by: PHYSICIAN ASSISTANT

## 2019-11-22 RX ORDER — DOXYCYCLINE HYCLATE 100 MG/1
100 CAPSULE ORAL 2 TIMES DAILY
Qty: 20 CAPSULE | Refills: 0 | Status: SHIPPED | OUTPATIENT
Start: 2019-11-22 | End: 2019-12-02

## 2019-11-22 NOTE — PATIENT INSTRUCTIONS
Patient Declined AVS    Verbal Instructions given      1. Doxycycline  2. Continue Nasacort and Claritin  3.  Follow up with PCP or ENT

## 2019-11-22 NOTE — TELEPHONE ENCOUNTER
Patient is scheduled for an MRI 11/27/19 and is requesting something for claustraphobia, would like sent to [facility]

## 2019-11-22 NOTE — PROGRESS NOTES
CHIEF COMPLAINT:   Patient presents with:  Sinus Problem: sinus pain and pressure, bl ear pain, sore throat x 1 month       HPI:   Laura Ibrahim is a 48year old male who presents for cold symptoms for  over a month.   Symptoms have progressed into sinus Take 1 tablet by mouth daily. • magnesium oxide 400 MG Oral Tab Take 1 tablet (400 mg total) by mouth daily. 30 tablet 0   • Loratadine (CLARITIN) 10 MG Oral Cap Take 10 mg by mouth nightly.        • Triamcinolone Acetonide 55 MCG/ACT Nasal Aerosol 2 sp lesions  HEENT: See HPI. LUNGS: denies shortness of breath or wheezing, See HPI  CARDIOVASCULAR: denies chest pain or palpitations   GI: denies N/V/C or abdominal pain  NEURO: + sinus headaches. No numbness or tingling in face.     EXAM:   /80 (BP side effects of medication addressed and explained. Patient Instructions   Patient Declined AVS    Verbal Instructions given      1. Doxycycline  2. Continue Nasacort and Claritin  3.  Follow up with PCP or ENT            The patient indicates understand

## 2019-11-26 RX ORDER — ALPRAZOLAM 0.25 MG/1
TABLET ORAL
Refills: 0 | COMMUNITY
Start: 2019-11-22 | End: 2019-12-10

## 2019-11-26 NOTE — TELEPHONE ENCOUNTER
IPRATROPIUM 0.03% SPRAY          Will file in chart as: IPRATROPIUM BROMIDE 0.03 % Nasal Solution         Sig: SPRAY 2 SPRAYS BY NASAL ROUTE EVERY 12 (TWELVE) HOURS.     Disp:  Not specified (Pharmacy requested: 30 spray)    Refills:

## 2019-11-27 ENCOUNTER — HOSPITAL ENCOUNTER (OUTPATIENT)
Dept: MRI IMAGING | Age: 54
Discharge: HOME | End: 2019-11-27
Attending: FAMILY MEDICINE
Payer: COMMERCIAL

## 2019-11-27 DIAGNOSIS — R20.0 NUMBNESS AND TINGLING IN LEFT ARM: ICD-10-CM

## 2019-11-27 DIAGNOSIS — R20.2 NUMBNESS AND TINGLING IN LEFT ARM: ICD-10-CM

## 2019-11-27 DIAGNOSIS — R07.89 CHEST TIGHTNESS: ICD-10-CM

## 2019-11-27 DIAGNOSIS — R42 DIZZINESS OF UNKNOWN CAUSE: ICD-10-CM

## 2019-11-27 PROCEDURE — 72146 MRI CHEST SPINE W/O DYE: CPT | Performed by: FAMILY MEDICINE

## 2019-11-27 PROCEDURE — 72141 MRI NECK SPINE W/O DYE: CPT | Performed by: FAMILY MEDICINE

## 2019-11-27 RX ORDER — IPRATROPIUM BROMIDE 21 UG/1
2 SPRAY, METERED NASAL EVERY 12 HOURS
Qty: 30 ML | Refills: 0 | Status: SHIPPED | OUTPATIENT
Start: 2019-11-27 | End: 2019-12-10 | Stop reason: ALTCHOICE

## 2019-12-10 ENCOUNTER — OFFICE VISIT (OUTPATIENT)
Dept: FAMILY MEDICINE CLINIC | Facility: CLINIC | Age: 54
End: 2019-12-10

## 2019-12-10 VITALS
HEART RATE: 84 BPM | OXYGEN SATURATION: 96 % | DIASTOLIC BLOOD PRESSURE: 80 MMHG | WEIGHT: 279 LBS | TEMPERATURE: 98 F | RESPIRATION RATE: 16 BRPM | BODY MASS INDEX: 36.19 KG/M2 | HEIGHT: 73.5 IN | SYSTOLIC BLOOD PRESSURE: 120 MMHG

## 2019-12-10 DIAGNOSIS — R42 DIZZINESS: Primary | ICD-10-CM

## 2019-12-10 DIAGNOSIS — R51.9 LEFT FACIAL PAIN: ICD-10-CM

## 2019-12-10 DIAGNOSIS — R20.0 LEFT FACIAL NUMBNESS: ICD-10-CM

## 2019-12-10 PROCEDURE — 99214 OFFICE O/P EST MOD 30 MIN: CPT | Performed by: FAMILY MEDICINE

## 2019-12-10 RX ORDER — PREDNISONE 20 MG/1
40 TABLET ORAL DAILY
Qty: 10 TABLET | Refills: 0 | Status: SHIPPED | OUTPATIENT
Start: 2019-12-10 | End: 2019-12-15

## 2019-12-10 NOTE — PROGRESS NOTES
Chief Complaint:  Patient presents with:  Dizziness: Ongoing for x 3 weeks  Ear Problem: Pressure and ringing sensation feeling on Left Ear and Feels Left side of Face is swollen    HPI:  This is a 48year old male patient presenting for Dizziness (Ongoing below.    HISTORY:  Past Medical History:   Diagnosis Date   • Abdominal pain     Occasional   • Back pain    • Back problem    • Bloating     Occasional   • Calculus of kidney    • Constipation     Occasional   • Diarrhea, unspecified     Occasional   • D aspirin 81 MG Oral Chew Tab Chew 1 tablet (81 mg total) by mouth daily. 30 tablet 11   • Lactobacillus (PROBIOTIC ACIDOPHILUS OR) Take by mouth daily. • Multiple Vitamins-Minerals (MULTI-VITAMIN/MINERALS) Oral Tab Take 1 tablet by mouth daily.      • symptoms could be related to sinusitis vs trigeminal neuralgia. At this time, will give trial of prednisone. Will have close follow up and if symptoms not improving, will consider imaging vs seeing ENT. -     predniSONE 20 MG Oral Tab;  Take 2 tablets (40

## 2019-12-16 RX ORDER — PREDNISONE 10 MG/1
TABLET ORAL
Refills: 0 | COMMUNITY
Start: 2019-12-10 | End: 2019-12-19 | Stop reason: ALTCHOICE

## 2019-12-19 ENCOUNTER — OFFICE VISIT (OUTPATIENT)
Dept: FAMILY MEDICINE CLINIC | Facility: CLINIC | Age: 54
End: 2019-12-19

## 2019-12-19 VITALS
SYSTOLIC BLOOD PRESSURE: 116 MMHG | BODY MASS INDEX: 37.37 KG/M2 | DIASTOLIC BLOOD PRESSURE: 72 MMHG | RESPIRATION RATE: 16 BRPM | WEIGHT: 282 LBS | TEMPERATURE: 98 F | HEART RATE: 70 BPM | OXYGEN SATURATION: 97 % | HEIGHT: 73 IN

## 2019-12-19 DIAGNOSIS — R51.9 LEFT FACIAL PAIN: ICD-10-CM

## 2019-12-19 DIAGNOSIS — R20.0 LEFT FACIAL NUMBNESS: ICD-10-CM

## 2019-12-19 DIAGNOSIS — R07.89 CHEST TIGHTNESS: ICD-10-CM

## 2019-12-19 DIAGNOSIS — R42 DIZZINESS: Primary | ICD-10-CM

## 2019-12-19 DIAGNOSIS — R20.0 NUMBNESS AND TINGLING IN LEFT ARM: ICD-10-CM

## 2019-12-19 DIAGNOSIS — R20.2 NUMBNESS AND TINGLING IN LEFT ARM: ICD-10-CM

## 2019-12-19 PROCEDURE — 99214 OFFICE O/P EST MOD 30 MIN: CPT | Performed by: FAMILY MEDICINE

## 2019-12-19 RX ORDER — PREDNISONE 10 MG/1
TABLET ORAL
Qty: 32 TABLET | Refills: 0 | Status: SHIPPED | OUTPATIENT
Start: 2019-12-19 | End: 2020-01-13 | Stop reason: ALTCHOICE

## 2019-12-19 RX ORDER — MECLIZINE HCL 12.5 MG/1
12.5 TABLET ORAL 3 TIMES DAILY PRN
Qty: 30 TABLET | Refills: 0 | Status: SHIPPED | OUTPATIENT
Start: 2019-12-19 | End: 2020-02-03

## 2019-12-19 NOTE — PROGRESS NOTES
Chief Complaint:  Patient presents with:  Dizziness: Follow Up, states he is still feeling dizzy   Ear Problem:  Follow Up from Left Ear, states Fullness and swelling has not improved     HPI:  This is a 48year old male patient presenting for Dizziness (Fo Pneumonia due to organism    • Sleep apnea     Possible- sleep study scheduled 4/19/19   • Sleep disturbance     Daily   • Wears glasses     Since late teens      Past Surgical History:   Procedure Laterality Date   • COLONOSCOPY        Family History   Pr sprays by Nasal route daily. Allergies:    Bactrim Ds              HIVES  Bactrim [Sulfametho*    RASH  Levofloxacin            NAUSEA ONLY    Comment:Other reaction(s): NAUSEA    EXAM:   12/19/19  1526   BP: 116/72   Pulse: 70   Resp: 16   Temp: 97. predniSONE 10 MG Oral Tab    Meclizine HCl 12.5 MG Oral Tab    Other Relevant Orders    MRI BRAIN (CPT=70551)    ENT - INTERNAL          Advised the following:  Naeem Fountain was seen today for dizziness and ear problem.     Diagnoses and all orders for this vis

## 2019-12-30 DIAGNOSIS — R09.82 PND (POST-NASAL DRIP): ICD-10-CM

## 2019-12-30 RX ORDER — IPRATROPIUM BROMIDE 21 UG/1
SPRAY, METERED NASAL
Qty: 1 BOTTLE | Refills: 1 | Status: SHIPPED | OUTPATIENT
Start: 2019-12-30 | End: 2020-02-03

## 2019-12-30 NOTE — TELEPHONE ENCOUNTER
LOV 12/19/2019     Patient was asked to follow-up in:     Appointment scheduled: Visit date not found     Refill request for:    Requested Prescriptions     Pending Prescriptions Disp Refills   • IPRATROPIUM BROMIDE 0.03 % Nasal Solution [Pharmacy Med Name

## 2020-01-02 DIAGNOSIS — F41.8 SITUATIONAL ANXIETY: ICD-10-CM

## 2020-01-02 RX ORDER — ALPRAZOLAM 0.25 MG/1
0.25 TABLET ORAL ONCE
Qty: 2 TABLET | Refills: 0 | Status: SHIPPED | OUTPATIENT
Start: 2020-01-02 | End: 2020-01-02

## 2020-01-06 ENCOUNTER — HOSPITAL ENCOUNTER (OUTPATIENT)
Dept: MRI IMAGING | Facility: HOSPITAL | Age: 55
Discharge: HOME | End: 2020-01-06
Attending: FAMILY MEDICINE
Payer: COMMERCIAL

## 2020-01-06 DIAGNOSIS — R07.89 CHEST TIGHTNESS: ICD-10-CM

## 2020-01-06 DIAGNOSIS — R20.0 LEFT FACIAL NUMBNESS: ICD-10-CM

## 2020-01-06 DIAGNOSIS — R20.2 NUMBNESS AND TINGLING IN LEFT ARM: ICD-10-CM

## 2020-01-06 DIAGNOSIS — R20.0 NUMBNESS AND TINGLING IN LEFT ARM: ICD-10-CM

## 2020-01-06 DIAGNOSIS — R42 DIZZINESS: ICD-10-CM

## 2020-01-06 DIAGNOSIS — R51.9 LEFT FACIAL PAIN: ICD-10-CM

## 2020-01-06 PROCEDURE — 70551 MRI BRAIN STEM W/O DYE: CPT | Performed by: FAMILY MEDICINE

## 2020-01-22 DIAGNOSIS — K21.9 GASTROESOPHAGEAL REFLUX DISEASE, ESOPHAGITIS PRESENCE NOT SPECIFIED: ICD-10-CM

## 2020-01-22 RX ORDER — PANTOPRAZOLE SODIUM 20 MG/1
TABLET, DELAYED RELEASE ORAL
Qty: 90 TABLET | Refills: 1 | Status: SHIPPED | OUTPATIENT
Start: 2020-01-22 | End: 2021-01-29

## 2020-04-21 DIAGNOSIS — I49.3 PVCS (PREMATURE VENTRICULAR CONTRACTIONS): ICD-10-CM

## 2020-04-21 DIAGNOSIS — I10 BENIGN ESSENTIAL HTN: ICD-10-CM

## 2020-04-21 RX ORDER — METOPROLOL SUCCINATE 25 MG/1
TABLET, EXTENDED RELEASE ORAL
Qty: 90 TABLET | Refills: 0 | Status: SHIPPED | OUTPATIENT
Start: 2020-04-21 | End: 2020-07-27

## 2020-04-21 NOTE — TELEPHONE ENCOUNTER
Medication refilled per protocol.      Requested Prescriptions     Signed Prescriptions Disp Refills   • METOPROLOL SUCCINATE ER 25 MG Oral Tablet 24 Hr 90 tablet 0     Sig: TAKE 1 TABLET BY MOUTH EVERY DAY     Authorizing Provider: Erika Chao

## 2020-05-10 DIAGNOSIS — I10 BENIGN ESSENTIAL HTN: ICD-10-CM

## 2020-05-11 RX ORDER — LISINOPRIL 20 MG/1
TABLET ORAL
Qty: 90 TABLET | Refills: 0 | Status: SHIPPED | OUTPATIENT
Start: 2020-05-11 | End: 2020-08-26

## 2020-05-11 NOTE — TELEPHONE ENCOUNTER
Requested Prescriptions     Pending Prescriptions Disp Refills   • LISINOPRIL 20 MG Oral Tab [Pharmacy Med Name: LISINOPRIL 20 MG TABLET] 90 tablet 1     Sig: TAKE 1 TABLET BY MOUTH EVERY DAY     LOV 12/19/2019         Appointment scheduled: Visit date not

## 2020-05-22 ENCOUNTER — APPOINTMENT (OUTPATIENT)
Dept: CARDIOLOGY | Age: 55
End: 2020-05-22

## 2020-07-17 RX ORDER — POTASSIUM CHLORIDE 750 MG/1
TABLET, FILM COATED, EXTENDED RELEASE ORAL
Qty: 90 TABLET | Refills: 1 | Status: SHIPPED | OUTPATIENT
Start: 2020-07-17 | End: 2021-01-18

## 2020-07-24 DIAGNOSIS — I49.3 PVCS (PREMATURE VENTRICULAR CONTRACTIONS): ICD-10-CM

## 2020-07-24 DIAGNOSIS — I10 BENIGN ESSENTIAL HTN: ICD-10-CM

## 2020-07-27 RX ORDER — METOPROLOL SUCCINATE 25 MG/1
TABLET, EXTENDED RELEASE ORAL
Qty: 90 TABLET | Refills: 0 | Status: SHIPPED | OUTPATIENT
Start: 2020-07-27 | End: 2020-08-26

## 2020-07-27 NOTE — TELEPHONE ENCOUNTER
Requested Prescriptions     Pending Prescriptions Disp Refills   • METOPROLOL SUCCINATE ER 25 MG Oral Tablet 24 Hr [Pharmacy Med Name: METOPROLOL SUCC ER 25 MG TAB] 90 tablet 0     Sig: TAKE 1 TABLET BY MOUTH EVERY DAY     LOV 12/19/2019   Labs completed 1

## 2020-07-27 NOTE — TELEPHONE ENCOUNTER
called patient and scheduled appointment for 08/20 with Sada Lindsey, asked if can get a temporary refill to last until appointment time?

## 2020-07-27 NOTE — TELEPHONE ENCOUNTER
Requested Prescriptions     Pending Prescriptions Disp Refills   • METOPROLOL SUCCINATE ER 25 MG Oral Tablet 24 Hr [Pharmacy Med Name: METOPROLOL SUCC ER 25 MG TAB] 90 tablet 0     Sig: TAKE 1 TABLET BY MOUTH EVERY DAY     LOV 12/19/2019     \    Appointme

## 2020-07-31 ENCOUNTER — APPOINTMENT (OUTPATIENT)
Dept: CARDIOLOGY | Age: 55
End: 2020-07-31

## 2020-08-26 ENCOUNTER — OFFICE VISIT (OUTPATIENT)
Dept: FAMILY MEDICINE CLINIC | Facility: CLINIC | Age: 55
End: 2020-08-26

## 2020-08-26 VITALS
SYSTOLIC BLOOD PRESSURE: 122 MMHG | WEIGHT: 290 LBS | BODY MASS INDEX: 39.28 KG/M2 | DIASTOLIC BLOOD PRESSURE: 82 MMHG | HEIGHT: 72 IN | TEMPERATURE: 98 F | RESPIRATION RATE: 16 BRPM | HEART RATE: 72 BPM

## 2020-08-26 DIAGNOSIS — I10 BENIGN ESSENTIAL HTN: Primary | ICD-10-CM

## 2020-08-26 DIAGNOSIS — R42 DIZZINESS: ICD-10-CM

## 2020-08-26 DIAGNOSIS — Z13.220 SCREENING, LIPID: ICD-10-CM

## 2020-08-26 DIAGNOSIS — Z87.438 HISTORY OF EPIDIDYMITIS: ICD-10-CM

## 2020-08-26 DIAGNOSIS — I49.3 PVCS (PREMATURE VENTRICULAR CONTRACTIONS): ICD-10-CM

## 2020-08-26 DIAGNOSIS — Z12.5 SCREENING FOR PROSTATE CANCER: ICD-10-CM

## 2020-08-26 DIAGNOSIS — Z13.29 SCREENING FOR THYROID DISORDER: ICD-10-CM

## 2020-08-26 PROCEDURE — 99214 OFFICE O/P EST MOD 30 MIN: CPT | Performed by: PHYSICIAN ASSISTANT

## 2020-08-26 PROCEDURE — 3074F SYST BP LT 130 MM HG: CPT | Performed by: PHYSICIAN ASSISTANT

## 2020-08-26 PROCEDURE — 3008F BODY MASS INDEX DOCD: CPT | Performed by: PHYSICIAN ASSISTANT

## 2020-08-26 PROCEDURE — 3079F DIAST BP 80-89 MM HG: CPT | Performed by: PHYSICIAN ASSISTANT

## 2020-08-26 PROCEDURE — 90471 IMMUNIZATION ADMIN: CPT | Performed by: PHYSICIAN ASSISTANT

## 2020-08-26 PROCEDURE — 90732 PPSV23 VACC 2 YRS+ SUBQ/IM: CPT | Performed by: PHYSICIAN ASSISTANT

## 2020-08-26 RX ORDER — METOPROLOL SUCCINATE 25 MG/1
25 TABLET, EXTENDED RELEASE ORAL DAILY
Qty: 90 TABLET | Refills: 1 | Status: SHIPPED | OUTPATIENT
Start: 2020-08-26 | End: 2021-01-29

## 2020-08-26 RX ORDER — LISINOPRIL 20 MG/1
20 TABLET ORAL DAILY
Qty: 90 TABLET | Refills: 1 | Status: SHIPPED | OUTPATIENT
Start: 2020-08-26 | End: 2021-01-18

## 2020-08-26 RX ORDER — DULOXETIN HYDROCHLORIDE 20 MG/1
20 CAPSULE, DELAYED RELEASE ORAL DAILY
Qty: 90 CAPSULE | Refills: 0 | Status: SHIPPED | OUTPATIENT
Start: 2020-08-26 | End: 2020-11-18

## 2020-08-27 PROBLEM — E87.1 HYPONATREMIA: Status: RESOLVED | Noted: 2019-10-17 | Resolved: 2020-08-27

## 2020-08-27 PROBLEM — M54.41 ACUTE BILATERAL LOW BACK PAIN WITH BILATERAL SCIATICA: Status: RESOLVED | Noted: 2018-03-09 | Resolved: 2020-08-27

## 2020-08-27 PROBLEM — E87.2 METABOLIC ACIDOSIS: Status: RESOLVED | Noted: 2019-10-17 | Resolved: 2020-08-27

## 2020-08-27 PROBLEM — R53.83 OTHER FATIGUE: Status: RESOLVED | Noted: 2018-10-24 | Resolved: 2020-08-27

## 2020-08-27 PROBLEM — R22.33 AXILLARY MASS, BILATERAL: Status: RESOLVED | Noted: 2019-04-16 | Resolved: 2020-08-27

## 2020-08-27 PROBLEM — R22.42 MASS OF LEFT THIGH: Status: RESOLVED | Noted: 2019-04-16 | Resolved: 2020-08-27

## 2020-08-27 PROBLEM — M54.42 ACUTE BILATERAL LOW BACK PAIN WITH BILATERAL SCIATICA: Status: RESOLVED | Noted: 2018-03-09 | Resolved: 2020-08-27

## 2020-08-27 PROBLEM — E87.20 METABOLIC ACIDOSIS: Status: RESOLVED | Noted: 2019-10-17 | Resolved: 2020-08-27

## 2020-08-27 PROBLEM — Z12.11 SPECIAL SCREENING FOR MALIGNANT NEOPLASMS, COLON: Status: RESOLVED | Noted: 2019-05-09 | Resolved: 2020-08-27

## 2020-08-27 NOTE — PROGRESS NOTES
Patient presents with:  Blood Pressure: refill on metoprolol and lisinopril       HISTORY OF PRESENT ILLNESS  Laura Ibrahim is a 50year old male who presents for medication follow up.  He has been taking metoprolol which helps with PVCs and lisinopril fo of malignant melanoma of skin     Fatty liver     Benign essential HTN     Obesity (BMI 35.0-39.9 without comorbidity)     Asbestos exposure     Chest pain, atypical     Hyperglycemia     Palpitations     Chest pain of uncertain etiology     Snoring     PV

## 2020-11-18 RX ORDER — DULOXETIN HYDROCHLORIDE 20 MG/1
CAPSULE, DELAYED RELEASE ORAL
Qty: 90 CAPSULE | Refills: 0 | Status: SHIPPED | OUTPATIENT
Start: 2020-11-18 | End: 2021-01-29

## 2021-01-17 ENCOUNTER — TELEPHONE (OUTPATIENT)
Dept: CARDIOLOGY | Age: 56
End: 2021-01-17

## 2021-01-18 DIAGNOSIS — I10 BENIGN ESSENTIAL HTN: ICD-10-CM

## 2021-01-18 RX ORDER — LISINOPRIL 20 MG/1
TABLET ORAL
Qty: 90 TABLET | Refills: 0 | Status: SHIPPED | OUTPATIENT
Start: 2021-01-18 | End: 2021-05-07

## 2021-01-18 RX ORDER — POTASSIUM CHLORIDE 750 MG/1
TABLET, FILM COATED, EXTENDED RELEASE ORAL
Qty: 30 TABLET | Refills: 0 | Status: SHIPPED | OUTPATIENT
Start: 2021-01-18 | End: 2021-02-11

## 2021-01-18 NOTE — TELEPHONE ENCOUNTER
Refill request for:    Requested Prescriptions     Pending Prescriptions Disp Refills   • LISINOPRIL 20 MG Oral Tab [Pharmacy Med Name: LISINOPRIL 20 MG TABLET] 90 tablet 1     Sig: TAKE 1 TABLET BY MOUTH EVERY DAY        Last Prescribed Quantity Refills

## 2021-01-22 ENCOUNTER — LAB ENCOUNTER (OUTPATIENT)
Dept: LAB | Age: 56
End: 2021-01-22
Attending: PHYSICIAN ASSISTANT
Payer: COMMERCIAL

## 2021-01-22 DIAGNOSIS — Z12.5 SCREENING FOR PROSTATE CANCER: ICD-10-CM

## 2021-01-22 DIAGNOSIS — I10 BENIGN ESSENTIAL HTN: ICD-10-CM

## 2021-01-22 DIAGNOSIS — Z13.220 SCREENING, LIPID: ICD-10-CM

## 2021-01-22 DIAGNOSIS — Z13.29 SCREENING FOR THYROID DISORDER: ICD-10-CM

## 2021-01-22 LAB
ALBUMIN SERPL-MCNC: 4 G/DL (ref 3.4–5)
ALBUMIN/GLOB SERPL: 1.1 {RATIO} (ref 1–2)
ALP LIVER SERPL-CCNC: 53 U/L
ALT SERPL-CCNC: 43 U/L
ANION GAP SERPL CALC-SCNC: 4 MMOL/L (ref 0–18)
AST SERPL-CCNC: 18 U/L (ref 15–37)
BILIRUB SERPL-MCNC: 0.7 MG/DL (ref 0.1–2)
BUN BLD-MCNC: 15 MG/DL (ref 7–18)
BUN/CREAT SERPL: 14.9 (ref 10–20)
CALCIUM BLD-MCNC: 9.4 MG/DL (ref 8.5–10.1)
CHLORIDE SERPL-SCNC: 106 MMOL/L (ref 98–112)
CHOLEST SMN-MCNC: 150 MG/DL (ref ?–200)
CO2 SERPL-SCNC: 28 MMOL/L (ref 21–32)
COMPLEXED PSA SERPL-MCNC: 2.01 NG/ML (ref ?–4)
CREAT BLD-MCNC: 1.01 MG/DL
GLOBULIN PLAS-MCNC: 3.5 G/DL (ref 2.8–4.4)
GLUCOSE BLD-MCNC: 103 MG/DL (ref 70–99)
HDLC SERPL-MCNC: 53 MG/DL (ref 40–59)
LDLC SERPL CALC-MCNC: 85 MG/DL (ref ?–100)
M PROTEIN MFR SERPL ELPH: 7.5 G/DL (ref 6.4–8.2)
NONHDLC SERPL-MCNC: 97 MG/DL (ref ?–130)
OSMOLALITY SERPL CALC.SUM OF ELEC: 287 MOSM/KG (ref 275–295)
PATIENT FASTING Y/N/NP: YES
PATIENT FASTING Y/N/NP: YES
POTASSIUM SERPL-SCNC: 4.4 MMOL/L (ref 3.5–5.1)
SODIUM SERPL-SCNC: 138 MMOL/L (ref 136–145)
TRIGL SERPL-MCNC: 59 MG/DL (ref 30–149)
TSI SER-ACNC: 1.14 MIU/ML (ref 0.36–3.74)
VLDLC SERPL CALC-MCNC: 12 MG/DL (ref 0–30)

## 2021-01-22 PROCEDURE — 36415 COLL VENOUS BLD VENIPUNCTURE: CPT

## 2021-01-22 PROCEDURE — 80053 COMPREHEN METABOLIC PANEL: CPT

## 2021-01-22 PROCEDURE — 80061 LIPID PANEL: CPT

## 2021-01-22 PROCEDURE — 84443 ASSAY THYROID STIM HORMONE: CPT

## 2021-01-29 ENCOUNTER — OFFICE VISIT (OUTPATIENT)
Dept: FAMILY MEDICINE CLINIC | Facility: CLINIC | Age: 56
End: 2021-01-29

## 2021-01-29 VITALS
RESPIRATION RATE: 14 BRPM | BODY MASS INDEX: 37.24 KG/M2 | SYSTOLIC BLOOD PRESSURE: 138 MMHG | HEART RATE: 70 BPM | WEIGHT: 281 LBS | HEIGHT: 73 IN | DIASTOLIC BLOOD PRESSURE: 70 MMHG

## 2021-01-29 DIAGNOSIS — I49.3 PVCS (PREMATURE VENTRICULAR CONTRACTIONS): ICD-10-CM

## 2021-01-29 DIAGNOSIS — Z00.00 ROUTINE HEALTH MAINTENANCE: Primary | ICD-10-CM

## 2021-01-29 DIAGNOSIS — I10 BENIGN ESSENTIAL HTN: ICD-10-CM

## 2021-01-29 DIAGNOSIS — R73.09 ELEVATED GLUCOSE: ICD-10-CM

## 2021-01-29 PROCEDURE — 3008F BODY MASS INDEX DOCD: CPT | Performed by: FAMILY MEDICINE

## 2021-01-29 PROCEDURE — 3075F SYST BP GE 130 - 139MM HG: CPT | Performed by: FAMILY MEDICINE

## 2021-01-29 PROCEDURE — 99396 PREV VISIT EST AGE 40-64: CPT | Performed by: FAMILY MEDICINE

## 2021-01-29 PROCEDURE — 3078F DIAST BP <80 MM HG: CPT | Performed by: FAMILY MEDICINE

## 2021-01-29 RX ORDER — METOPROLOL SUCCINATE 25 MG/1
25 TABLET, EXTENDED RELEASE ORAL DAILY
Qty: 90 TABLET | Refills: 1 | Status: SHIPPED | OUTPATIENT
Start: 2021-01-29 | End: 2021-05-07

## 2021-01-29 NOTE — PROGRESS NOTES
Chief Complaint:  Patient presents with:  Blood Pressure:  Follow Up  Lab: Review Blood Work 01-   Referral: Requesting a new Cardiologist    HPI:  This is a 50year old male patient presenting for Blood Pressure (Follow Up), Lab (Review Blood Work • Back pain    • Back problem    • Bloating     Occasional   • Calculus of kidney    • Constipation     Occasional   • Diarrhea, unspecified     Occasional   • Dizziness     In conjunction with PVCs   • Enlarged lymph node     Currently under care   • Ep (81 mg total) by mouth daily. 30 tablet 11   • Lactobacillus (PROBIOTIC ACIDOPHILUS OR) Take by mouth daily. • Multiple Vitamins-Minerals (MULTI-VITAMIN/MINERALS) Oral Tab Take 1 tablet by mouth daily.      • magnesium oxide 400 MG Oral Tab Take 1 tab tablet (25 mg total) by mouth daily.   -     CARDIO - INTERNAL    RTC in 6 mo  Isidro Hannon DO  1/29/2021 8:17 AM  Family Medicine

## 2021-02-11 RX ORDER — POTASSIUM CHLORIDE 750 MG/1
TABLET, FILM COATED, EXTENDED RELEASE ORAL
Qty: 30 TABLET | Refills: 0 | Status: SHIPPED | OUTPATIENT
Start: 2021-02-11 | End: 2021-03-08

## 2021-02-24 ENCOUNTER — HOSPITAL ENCOUNTER (OUTPATIENT)
Age: 56
Discharge: HOME | End: 2021-02-24
Payer: COMMERCIAL

## 2021-02-24 ENCOUNTER — APPOINTMENT (OUTPATIENT)
Dept: CT IMAGING | Age: 56
End: 2021-02-24
Attending: NURSE PRACTITIONER
Payer: COMMERCIAL

## 2021-02-24 VITALS
HEART RATE: 76 BPM | RESPIRATION RATE: 18 BRPM | TEMPERATURE: 97 F | DIASTOLIC BLOOD PRESSURE: 85 MMHG | OXYGEN SATURATION: 96 % | SYSTOLIC BLOOD PRESSURE: 121 MMHG

## 2021-02-24 DIAGNOSIS — R19.7 DIARRHEA, UNSPECIFIED TYPE: ICD-10-CM

## 2021-02-24 DIAGNOSIS — R10.9 ABDOMINAL PAIN, ACUTE: Primary | ICD-10-CM

## 2021-02-24 LAB
#MXD IC: 0.6 X10ˆ3/UL (ref 0.1–1)
CREAT BLD-MCNC: 0.8 MG/DL
GLUCOSE BLD-MCNC: 108 MG/DL (ref 70–99)
HCT VFR BLD AUTO: 48.6 %
HGB BLD-MCNC: 16.3 G/DL
ISTAT BUN: 12 MG/DL (ref 7–18)
ISTAT CHLORIDE: 104 MMOL/L (ref 98–112)
ISTAT HEMATOCRIT: 49 %
ISTAT IONIZED CALCIUM FOR CHEM 8: 1.19 MMOL/L (ref 1.12–1.32)
ISTAT POTASSIUM: 4.2 MMOL/L (ref 3.6–5.1)
ISTAT SODIUM: 141 MMOL/L (ref 136–145)
ISTAT TCO2: 25 MMOL/L (ref 21–32)
LYMPHOCYTES # BLD AUTO: 2.2 X10ˆ3/UL (ref 1–4)
LYMPHOCYTES NFR BLD AUTO: 35.3 %
MCH RBC QN AUTO: 30.8 PG (ref 26–34)
MCHC RBC AUTO-ENTMCNC: 33.5 G/DL (ref 31–37)
MCV RBC AUTO: 91.9 FL (ref 80–100)
MIXED CELL %: 8.9 %
NEUTROPHILS # BLD AUTO: 3.5 X10ˆ3/UL (ref 1.5–7.7)
NEUTROPHILS NFR BLD AUTO: 55.8 %
PLATELET # BLD AUTO: 218 X10ˆ3/UL (ref 150–450)
POCT BILIRUBIN URINE: NEGATIVE
POCT BLOOD URINE: NEGATIVE
POCT GLUCOSE URINE: NEGATIVE MG/DL
POCT KETONE URINE: NEGATIVE MG/DL
POCT LEUKOCYTE ESTERASE URINE: NEGATIVE
POCT NITRITE URINE: NEGATIVE
POCT PH URINE: 6.5 (ref 5–8)
POCT PROTEIN URINE: NEGATIVE MG/DL
POCT SPECIFIC GRAVITY URINE: 1.02
POCT UROBILINOGEN URINE: 0.2 MG/DL
RBC # BLD AUTO: 5.29 X10ˆ6/UL
WBC # BLD AUTO: 6.3 X10ˆ3/UL (ref 4–11)

## 2021-02-24 PROCEDURE — 85025 COMPLETE CBC W/AUTO DIFF WBC: CPT | Performed by: NURSE PRACTITIONER

## 2021-02-24 PROCEDURE — 81002 URINALYSIS NONAUTO W/O SCOPE: CPT | Performed by: NURSE PRACTITIONER

## 2021-02-24 PROCEDURE — 74177 CT ABD & PELVIS W/CONTRAST: CPT | Performed by: NURSE PRACTITIONER

## 2021-02-24 PROCEDURE — 80047 BASIC METABLC PNL IONIZED CA: CPT | Performed by: NURSE PRACTITIONER

## 2021-02-24 PROCEDURE — 99213 OFFICE O/P EST LOW 20 MIN: CPT | Performed by: NURSE PRACTITIONER

## 2021-02-24 PROCEDURE — 36415 COLL VENOUS BLD VENIPUNCTURE: CPT | Performed by: NURSE PRACTITIONER

## 2021-02-24 RX ORDER — ONDANSETRON 4 MG/1
4 TABLET, ORALLY DISINTEGRATING ORAL EVERY 4 HOURS PRN
Qty: 10 TABLET | Refills: 0 | Status: SHIPPED | OUTPATIENT
Start: 2021-02-24 | End: 2021-03-03

## 2021-02-24 RX ORDER — SODIUM CHLORIDE 9 MG/ML
1000 INJECTION, SOLUTION INTRAVENOUS ONCE
Status: COMPLETED | OUTPATIENT
Start: 2021-02-24 | End: 2021-02-24

## 2021-02-24 RX ORDER — DICYCLOMINE HCL 20 MG
20 TABLET ORAL 4 TIMES DAILY PRN
Qty: 30 TABLET | Refills: 0 | Status: SHIPPED | OUTPATIENT
Start: 2021-02-24 | End: 2021-03-26

## 2021-02-24 NOTE — ED PROVIDER NOTES
Patient Seen in: [facility]      History   Patient presents with:  Diarrhea    Stated Complaint: abdominal problems    HPI/Subjective:   24-year-old male presents today with complaints of lower abdominal cramping and diarrhea.   Has had some na History:   Procedure Laterality Date   • COLONOSCOPY     • SINUS SURGERY                    Social History    Tobacco Use      Smoking status: Never Smoker      Smokeless tobacco: Never Used    Alcohol use:  Yes      Alcohol/week: 2.0 standard drinks      T POCT ISTAT CHEM8 CARTRIDGE - Abnormal; Notable for the following components:    ISTAT Glucose 108 (*)     All other components within normal limits   POCT CBC         Ct Abdomen+pelvis(contrast Only)(cpt=74177)    Result Date: 2/24/2021  PROCEDURE:  CT A from fatty liver.    Dictated by (CST): Ariela Mejia MD on 2/24/2021 at 9:21 AM     Finalized by (CST): Ariela Mejia MD on 2/24/2021 at 9:25 AM              MDM     Patient resents today with complaints of lower abdominal pain and diarrhea over the la 10 tablet Refills: 0

## 2021-02-24 NOTE — ED INITIAL ASSESSMENT (HPI)
Patient c/o diarrhea since last Sunday. Pt is colorblind so unsure if there has been blood or mucous in his stool. Some nausea, no vomiting. No fever. Last bm was this morning. States there was some solid stool, but is also having cramps in his lower abd.

## 2021-03-06 ENCOUNTER — TELEPHONE (OUTPATIENT)
Dept: CARDIOLOGY | Age: 56
End: 2021-03-06

## 2021-03-08 RX ORDER — POTASSIUM CHLORIDE 750 MG/1
TABLET, FILM COATED, EXTENDED RELEASE ORAL
Qty: 15 TABLET | Refills: 0 | Status: SHIPPED | OUTPATIENT
Start: 2021-03-08

## 2021-03-19 RX ORDER — POTASSIUM CHLORIDE 750 MG/1
TABLET, FILM COATED, EXTENDED RELEASE ORAL
Qty: 15 TABLET | Refills: 0 | OUTPATIENT
Start: 2021-03-19

## 2021-03-27 ENCOUNTER — IMMUNIZATION (OUTPATIENT)
Dept: LAB | Age: 56
End: 2021-03-27
Attending: HOSPITALIST
Payer: COMMERCIAL

## 2021-03-27 DIAGNOSIS — Z23 NEED FOR VACCINATION: Primary | ICD-10-CM

## 2021-03-27 PROCEDURE — 0001A SARSCOV2 VAC 30MCG/0.3ML IM: CPT

## 2021-04-17 ENCOUNTER — IMMUNIZATION (OUTPATIENT)
Dept: LAB | Age: 56
End: 2021-04-17
Attending: HOSPITALIST
Payer: COMMERCIAL

## 2021-04-17 DIAGNOSIS — Z23 NEED FOR VACCINATION: Primary | ICD-10-CM

## 2021-04-17 PROCEDURE — 0002A SARSCOV2 VAC 30MCG/0.3ML IM: CPT

## 2021-05-18 ENCOUNTER — HOSPITAL ENCOUNTER (OUTPATIENT)
Age: 56
Discharge: ANOTHER HOSPITAL | End: 2021-05-18
Payer: COMMERCIAL

## 2021-05-18 VITALS
HEIGHT: 72 IN | WEIGHT: 270 LBS | HEART RATE: 73 BPM | BODY MASS INDEX: 36.57 KG/M2 | TEMPERATURE: 99 F | SYSTOLIC BLOOD PRESSURE: 127 MMHG | OXYGEN SATURATION: 98 % | DIASTOLIC BLOOD PRESSURE: 94 MMHG | RESPIRATION RATE: 20 BRPM

## 2021-05-18 DIAGNOSIS — R07.9 CHEST PAIN OF UNCERTAIN ETIOLOGY: Primary | ICD-10-CM

## 2021-05-18 PROCEDURE — 99215 OFFICE O/P EST HI 40 MIN: CPT | Performed by: NURSE PRACTITIONER

## 2021-05-18 PROCEDURE — 93000 ELECTROCARDIOGRAM COMPLETE: CPT | Performed by: NURSE PRACTITIONER

## 2021-05-18 RX ORDER — ASPIRIN 81 MG/1
324 TABLET, CHEWABLE ORAL ONCE
Status: COMPLETED | OUTPATIENT
Start: 2021-05-18 | End: 2021-05-18

## 2021-05-18 NOTE — ED PROVIDER NOTES
Off-site physician assistant called me requesting phone consultation of the patient she was seen there. Patient is a 53-year-old male with chest pain that is been there for couple of days. She was concerned about an EKG.   Patient currently to grossly abn

## 2021-05-18 NOTE — ED INITIAL ASSESSMENT (HPI)
Pt states hx of PVC's, seen by cardiologist approx 11 days ago and has a holter monitor set up for next week. States past week has been having chest pressure and fatigue.  No shortness of breath, states just does not feel right and has been unable to sleep

## 2021-05-18 NOTE — ED QUICK NOTES
Pt reports that he takes his metoprolol in the am but if his pvc's increase at night he takes an extra half a pill.  Took it last night and it did  Not help

## 2021-05-18 NOTE — ED PROVIDER NOTES
Patient Seen in: Immediate [address] [place] [address]      History   Patient presents with:  Chest Pain    Stated Complaint: chest discomfort    HPI/Subjective:   HPI  68-year-old male with history of hypertension, heart palpitations, and sleep apnea who is on aspirin, Smokeless tobacco: Never Used    Vaping Use      Vaping Use: Never used    Alcohol use: Yes      Alcohol/week: 2.0 standard drinks      Types: 2 Cans of beer per week      Comment: Rarely    Drug use:  No             Review of Systems   All other systems re and 911 was called due to patient's complaints and EKG findings. Repeat EKG showed normal sinus rhythm with no acute ST changes however patient with chest pain worsening for 1 week.   Patient will be transported to the emergency room for further evaluation

## 2021-05-25 ENCOUNTER — OFFICE VISIT (OUTPATIENT)
Dept: FAMILY MEDICINE CLINIC | Facility: CLINIC | Age: 56
End: 2021-05-25

## 2021-05-25 VITALS
RESPIRATION RATE: 14 BRPM | DIASTOLIC BLOOD PRESSURE: 70 MMHG | BODY MASS INDEX: 37.79 KG/M2 | TEMPERATURE: 97 F | WEIGHT: 279 LBS | HEART RATE: 64 BPM | SYSTOLIC BLOOD PRESSURE: 120 MMHG | HEIGHT: 72 IN | OXYGEN SATURATION: 98 %

## 2021-05-25 DIAGNOSIS — M79.7 FIBROMYALGIA: Primary | ICD-10-CM

## 2021-05-25 PROCEDURE — 3008F BODY MASS INDEX DOCD: CPT | Performed by: FAMILY MEDICINE

## 2021-05-25 PROCEDURE — 99214 OFFICE O/P EST MOD 30 MIN: CPT | Performed by: FAMILY MEDICINE

## 2021-05-25 PROCEDURE — 3078F DIAST BP <80 MM HG: CPT | Performed by: FAMILY MEDICINE

## 2021-05-25 PROCEDURE — 3074F SYST BP LT 130 MM HG: CPT | Performed by: FAMILY MEDICINE

## 2021-05-25 NOTE — PROGRESS NOTES
Chief Complaint:  Patient presents with:  ER F/U: On 05- for Chest Pain-all test were normal, Has an appt with Cardio on Thursday for Holter Monitor     HPI:  This is a 49year old male patient presenting for ER F/U (On 05- for Chest Pain-all Heart palpitations     Idiopathic diagnosis   • Heartburn     Occasional   • Hemorrhoids    • High blood pressure    • Indigestion     Occasioal   • Irregular bowel habits    • Kidney stones    • Mass of left thigh 4/16/2019   • Migraines    • Nausea     O daily.     • magnesium oxide 400 MG Oral Tab Take 1 tablet (400 mg total) by mouth daily.  30 tablet 0     Allergies:    Bactrim Ds              HIVES  Bactrim [Sulfametho*    RASH  Levofloxacin            NAUSEA ONLY    Comment:Other reaction(s): NAUSEA

## 2021-06-06 ENCOUNTER — APPOINTMENT (OUTPATIENT)
Dept: GENERAL RADIOLOGY | Facility: HOSPITAL | Age: 56
End: 2021-06-06
Payer: COMMERCIAL

## 2021-06-06 ENCOUNTER — APPOINTMENT (OUTPATIENT)
Dept: CT IMAGING | Facility: HOSPITAL | Age: 56
End: 2021-06-06
Attending: EMERGENCY MEDICINE
Payer: COMMERCIAL

## 2021-06-06 ENCOUNTER — HOSPITAL ENCOUNTER (OUTPATIENT)
Facility: HOSPITAL | Age: 56
Setting detail: OBSERVATION
Discharge: HOME | End: 2021-06-07
Attending: EMERGENCY MEDICINE | Admitting: INTERNAL MEDICINE
Payer: COMMERCIAL

## 2021-06-06 DIAGNOSIS — R00.2 PALPITATIONS: ICD-10-CM

## 2021-06-06 DIAGNOSIS — I49.3 PVC (PREMATURE VENTRICULAR CONTRACTION): ICD-10-CM

## 2021-06-06 DIAGNOSIS — R51.9 NONINTRACTABLE HEADACHE, UNSPECIFIED CHRONICITY PATTERN, UNSPECIFIED HEADACHE TYPE: ICD-10-CM

## 2021-06-06 DIAGNOSIS — R07.89 CHEST PAIN, ATYPICAL: Primary | ICD-10-CM

## 2021-06-06 PROBLEM — R79.89 AZOTEMIA: Status: ACTIVE | Noted: 2021-06-06

## 2021-06-06 PROCEDURE — 99220 INITIAL OBSERVATION CARE,LEVL III: CPT | Performed by: INTERNAL MEDICINE

## 2021-06-06 PROCEDURE — 70450 CT HEAD/BRAIN W/O DYE: CPT | Performed by: EMERGENCY MEDICINE

## 2021-06-06 PROCEDURE — 71045 X-RAY EXAM CHEST 1 VIEW: CPT | Performed by: EMERGENCY MEDICINE

## 2021-06-06 RX ORDER — SODIUM CHLORIDE 9 MG/ML
INJECTION, SOLUTION INTRAVENOUS CONTINUOUS
Status: CANCELLED | OUTPATIENT
Start: 2021-06-06 | End: 2021-06-07

## 2021-06-06 RX ORDER — ASPIRIN 81 MG/1
324 TABLET, CHEWABLE ORAL ONCE
Status: COMPLETED | OUTPATIENT
Start: 2021-06-06 | End: 2021-06-06

## 2021-06-06 RX ORDER — LORATADINE 10 MG/1
10 TABLET ORAL DAILY
COMMUNITY

## 2021-06-07 ENCOUNTER — APPOINTMENT (OUTPATIENT)
Dept: CV DIAGNOSTICS | Facility: HOSPITAL | Age: 56
End: 2021-06-07
Attending: NURSE PRACTITIONER
Payer: COMMERCIAL

## 2021-06-07 ENCOUNTER — APPOINTMENT (OUTPATIENT)
Dept: CV DIAGNOSTICS | Facility: HOSPITAL | Age: 56
End: 2021-06-07
Attending: INTERNAL MEDICINE
Payer: COMMERCIAL

## 2021-06-07 VITALS
BODY MASS INDEX: 36.57 KG/M2 | HEART RATE: 82 BPM | RESPIRATION RATE: 21 BRPM | DIASTOLIC BLOOD PRESSURE: 66 MMHG | OXYGEN SATURATION: 97 % | TEMPERATURE: 98 F | SYSTOLIC BLOOD PRESSURE: 116 MMHG | HEIGHT: 72 IN | WEIGHT: 270 LBS

## 2021-06-07 PROCEDURE — B24CYZZ ULTRASONOGRAPHY OF PERICARDIUM USING OTHER CONTRAST: ICD-10-PCS | Performed by: INTERNAL MEDICINE

## 2021-06-07 PROCEDURE — 99217 OBSERVATION CARE DISCHARGE: CPT | Performed by: INTERNAL MEDICINE

## 2021-06-07 PROCEDURE — 93306 TTE W/DOPPLER COMPLETE: CPT | Performed by: INTERNAL MEDICINE

## 2021-06-07 PROCEDURE — 93017 CV STRESS TEST TRACING ONLY: CPT | Performed by: NURSE PRACTITIONER

## 2021-06-07 PROCEDURE — 93018 CV STRESS TEST I&R ONLY: CPT | Performed by: NURSE PRACTITIONER

## 2021-06-07 PROCEDURE — B24BZZZ ULTRASONOGRAPHY OF HEART WITH AORTA: ICD-10-PCS | Performed by: INTERNAL MEDICINE

## 2021-06-07 PROCEDURE — 78452 HT MUSCLE IMAGE SPECT MULT: CPT | Performed by: NURSE PRACTITIONER

## 2021-06-07 PROCEDURE — B246YZZ ULTRASONOGRAPHY OF RIGHT AND LEFT HEART USING OTHER CONTRAST: ICD-10-PCS | Performed by: INTERNAL MEDICINE

## 2021-06-07 RX ORDER — MAGNESIUM OXIDE 400 MG (241.3 MG MAGNESIUM) TABLET
400 TABLET DAILY
Status: DISCONTINUED | OUTPATIENT
Start: 2021-06-07 | End: 2021-06-07

## 2021-06-07 RX ORDER — DILTIAZEM HYDROCHLORIDE 180 MG/1
180 CAPSULE, EXTENDED RELEASE ORAL DAILY
Status: DISCONTINUED | OUTPATIENT
Start: 2021-06-07 | End: 2021-06-07

## 2021-06-07 RX ORDER — CETIRIZINE HYDROCHLORIDE 10 MG/1
10 TABLET ORAL DAILY
Status: DISCONTINUED | OUTPATIENT
Start: 2021-06-07 | End: 2021-06-07

## 2021-06-07 RX ORDER — METOPROLOL SUCCINATE 25 MG/1
25 TABLET, EXTENDED RELEASE ORAL
Status: DISCONTINUED | OUTPATIENT
Start: 2021-06-07 | End: 2021-06-07

## 2021-06-07 RX ORDER — ONDANSETRON 2 MG/ML
4 INJECTION INTRAMUSCULAR; INTRAVENOUS EVERY 6 HOURS PRN
Status: DISCONTINUED | OUTPATIENT
Start: 2021-06-07 | End: 2021-06-07

## 2021-06-07 RX ORDER — ASPIRIN 81 MG/1
81 TABLET, CHEWABLE ORAL DAILY
Status: DISCONTINUED | OUTPATIENT
Start: 2021-06-07 | End: 2021-06-07

## 2021-06-07 RX ORDER — LISINOPRIL 20 MG/1
20 TABLET ORAL DAILY
Status: DISCONTINUED | OUTPATIENT
Start: 2021-06-07 | End: 2021-06-07

## 2021-06-07 RX ORDER — DILTIAZEM HYDROCHLORIDE 180 MG/1
180 CAPSULE, COATED, EXTENDED RELEASE ORAL DAILY
Qty: 30 CAPSULE | Refills: 1 | Status: SHIPPED | OUTPATIENT
Start: 2021-06-08 | End: 2021-07-07

## 2021-06-07 RX ORDER — ACETAMINOPHEN 325 MG/1
650 TABLET ORAL EVERY 6 HOURS PRN
Status: DISCONTINUED | OUTPATIENT
Start: 2021-06-07 | End: 2021-06-07

## 2021-06-07 RX ORDER — ENOXAPARIN SODIUM 100 MG/ML
40 INJECTION SUBCUTANEOUS DAILY
Status: DISCONTINUED | OUTPATIENT
Start: 2021-06-07 | End: 2021-06-07

## 2021-06-07 RX ORDER — NITROGLYCERIN 0.4 MG/1
0.4 TABLET SUBLINGUAL EVERY 5 MIN PRN
Status: DISCONTINUED | OUTPATIENT
Start: 2021-06-07 | End: 2021-06-07

## 2021-06-07 RX ORDER — ASPIRIN 325 MG
325 TABLET ORAL DAILY
Status: DISCONTINUED | OUTPATIENT
Start: 2021-06-07 | End: 2021-06-07

## 2021-06-07 NOTE — H&P
[facility]PITAL[facility]                                                               History & Physical         Pk Mate Patient Status:  Emergency    1965 MRN [id number]   Location [facility] Attending Vivienne Frank bowel habits    • Kidney stones    • Mass of left thigh 4/16/2019   • Migraines    • Nausea     Occasional   • Pain with bowel movements     Occasional   • Pneumonia due to organism    • Sleep apnea     Possible- sleep study scheduled 4/19/19   • Sleep dis Awake alert, no focal neurological deficits. Musculoskeletal: Full range of motion of all extremities. No swelling noted. Integument: No lesions. No erythema. Psychiatric: Appropriate mood and affect.       Diagnostic Data:      Laboratory Data:   Lab R headache, possible migraine headache  1. Tylenol as needed      Quality:  · DVT Prophylaxis: SCD, subcutaneous Lovenox  · CODE status: full  · Becker: no    Plan of care discussed with patient and patient's wife at bedside      Discussed with ER Physician.

## 2021-06-07 NOTE — PLAN OF CARE
NURSING DISCHARGE NOTE    Discharged Home via Wheelchair. Accompanied by Spouse  Belongings Taken by patient/family.     Received patient A/Ox4, JOSE MARIA, NSR on tele at 0700  Echo completed  Stress Test completed  Frequent PVCs, patient reports he feels the emergency measures for life threatening arrhythmias  - Monitor electrolytes and administer replacement therapy as ordered  Outcome: Adequate for Discharge

## 2021-06-07 NOTE — PROGRESS NOTES
Patient completed the exercise nuc with occ pvcs and slight dizziness at peak exercise resolving in recovery'

## 2021-06-07 NOTE — CONSULTS
[facility]  Report of Consultation    Mehnaz Marshall Patient Status:  Observation    1965 MRN [id number]   [facility] 7NE-A Attending Melly Garcia,    Hosp Day # 0 PCP [address], DO     Cardiology attending:  Seen a Pain with bowel movements     Occasional   • Pneumonia due to organism    • Sleep apnea     Possible- sleep study scheduled 4/19/19   • Sleep disturbance     Daily   • Wears glasses     Since late teens     Past Surgical History:   Procedure Laterality Juan Jose (24hrs), Av.9 °F (36.6 °C), Min:97.8 °F (36.6 °C), Max:98 °F (36.7 °C)    Wt Readings from Last 3 Encounters:  21 : 270 lb (122.5 kg)  21 : 279 lb (126.6 kg)  21 : 270 lb (122.5 kg)      Telemetry: SR with frequent PVCs, at times in p bothersome last several weeks. Associated lightheadedness, nausea, headache.   - on BB therapy, previously has not tolerated increased doses. Consult with Dr. Aletha Schlatter recommended considering adding CCB therapy or considering ablation.  At the time, he was not

## 2021-06-07 NOTE — ED PROVIDER NOTES
Patient Seen in: [facility] Emergency Department      History   Patient presents with:  Chest Pain Angina    Stated Complaint: cp    HPI/Subjective:   HPI    Patient is a 42-year-old male presents emergency room with a history of multiple complaints pressure    • Indigestion     Occasioal   • Irregular bowel habits    • Kidney stones    • Mass of left thigh 4/16/2019   • Migraines    • Nausea     Occasional   • Pain with bowel movements     Occasional   • Pneumonia due to organism    • Sleep apnea murmur. ABDOMEN: There is no focal tenderness to palpation appreciated anywhere throughout the abdomen. There is no guarding, no rebound, no mass, and no organomegaly appreciated. There is normoactive bowel sounds. There is no hernia.   EXTREMITIES: There CHEST AP PORTABLE  (CPT=71045)    Result Date: 6/6/2021  CONCLUSION:  No acute pulmonary findings.     Dictated by (CST): Malachi Holstein, MD on 6/06/2021 at 10:01 PM     Finalized by (CST): Malachi Holstein, MD on 6/06/2021 at 10:02 PM              MDM Clinical Impression:  Chest pain, atypical  (primary encounter diagnosis)  Palpitations  PVC (premature ventricular contraction)  Nonintractable headache, unspecified chronicity pattern, unspecified headache type     Disposition:  Admit  6/6/2021 10:15

## 2021-06-07 NOTE — ED INITIAL ASSESSMENT (HPI)
Patient sts ongoing for a few weeks, has hx of PVC's, today he wasn't feeling good, dizzy, taking bp at home and HR was low he said in the 50's. Discomfort around left chest, + nausea. Denies SOB. Patient sts overall feels \"uncomfortable. \"

## 2021-06-07 NOTE — DISCHARGE SUMMARY
[facility]OSPITAL[facility]  DISCHARGE SUMMARY     Tracy Olmedo Patient Status:  Observation    1965 MRN [id number]   [facility] 7NE-A Attending No att. providers found   Hosp Day # 0 PCP Samuel Valderrama DO     Date of Admission: 2021  [facility]    Procedures during hospitalization:   • none    Incidental or significant findings and recommendations (brief descriptions):  • As above    Lab/Test results pending at Discharge:   · none    Consultants:  • Cardiology     Discharge Medication L cardiology follow up    Appointments for Next 30 Days 6/7/2021 - 7/7/2021 Comment      Date Arrival Time Visit Type Length Department Provider     6/18/2021 12:00 PM  NON-TCM HOSPITAL FOLLOW UP [9310] 60 min [facility] Melissa Monet, APRN

## 2021-06-07 NOTE — PLAN OF CARE
Assumed care at 1930  Pt states having continued discomfort on L side of chest, and lightheaded  Plan for echo  Needs met will continue to monitor          Problem: Patient/Family Goals  Goal: Patient/Family Long Term Goal  Description: Patient's Long Term

## 2021-06-09 ENCOUNTER — PATIENT OUTREACH (OUTPATIENT)
Dept: CASE MANAGEMENT | Age: 56
End: 2021-06-09

## 2021-06-09 DIAGNOSIS — R07.89 CHEST PAIN, ATYPICAL: ICD-10-CM

## 2021-06-09 DIAGNOSIS — Z02.9 ENCOUNTERS FOR UNSPECIFIED ADMINISTRATIVE PURPOSE: ICD-10-CM

## 2021-06-09 NOTE — PROGRESS NOTES
Initial Post Discharge Follow Up   Discharge Date: 6/7/21  Contact Date: 6/9/2021    Consent Verification:  Assessment Completed With: Patient  HIPAA Verified?   Yes    Discharge Dx:     Palpitations, chest pain, dizziness, frequent PVCs  Essential hyper Tablet 24 Hr Take 1 tablet (25 mg total) by mouth daily. (Patient taking differently: Take 25 mg by mouth daily. Taking 25mg in am, and 12.5mg at night ) 90 tablet 3   • Omega-3 Fatty Acids (FISH OIL CONCENTRATE OR) Take by mouth.      • aspirin 81 MG Oral Group, [address] [facility]  ([facility] [address])        Jul 07, 2021  3:20 PM CDT Follow Up Visit with Lam Russell MD [facility] - [address] [place] [address])    For the safety of our patients, visitors and care with patient     Yes    Overall Rating: How would you rate the care you received while in the hospital?  good     Interventions by NCM: All d/c instructions reviewed with the pt. Reviewed when to call MD vs when to call 911 or go the ED.  Educated pt on t

## 2021-07-02 ENCOUNTER — OFFICE VISIT (OUTPATIENT)
Dept: FAMILY MEDICINE CLINIC | Facility: CLINIC | Age: 56
End: 2021-07-02

## 2021-07-02 VITALS
HEART RATE: 84 BPM | RESPIRATION RATE: 16 BRPM | HEIGHT: 72 IN | WEIGHT: 261 LBS | SYSTOLIC BLOOD PRESSURE: 128 MMHG | TEMPERATURE: 98 F | BODY MASS INDEX: 35.35 KG/M2 | OXYGEN SATURATION: 98 % | DIASTOLIC BLOOD PRESSURE: 80 MMHG

## 2021-07-02 DIAGNOSIS — I49.3 PVCS (PREMATURE VENTRICULAR CONTRACTIONS): ICD-10-CM

## 2021-07-02 DIAGNOSIS — Z80.8 FAMILY HX OF MELANOMA: ICD-10-CM

## 2021-07-02 DIAGNOSIS — I10 BENIGN ESSENTIAL HTN: Primary | ICD-10-CM

## 2021-07-02 DIAGNOSIS — L81.9 PIGMENTED SKIN LESION OF UNCERTAIN NATURE: ICD-10-CM

## 2021-07-02 PROCEDURE — 3074F SYST BP LT 130 MM HG: CPT | Performed by: FAMILY MEDICINE

## 2021-07-02 PROCEDURE — 99214 OFFICE O/P EST MOD 30 MIN: CPT | Performed by: FAMILY MEDICINE

## 2021-07-02 PROCEDURE — 3079F DIAST BP 80-89 MM HG: CPT | Performed by: FAMILY MEDICINE

## 2021-07-02 PROCEDURE — 3008F BODY MASS INDEX DOCD: CPT | Performed by: FAMILY MEDICINE

## 2021-07-02 RX ORDER — METOPROLOL SUCCINATE 25 MG/1
TABLET, EXTENDED RELEASE ORAL
Qty: 135 TABLET | Refills: 1 | Status: SHIPPED | OUTPATIENT
Start: 2021-07-02 | End: 2021-12-22

## 2021-07-02 NOTE — PROGRESS NOTES
Chief Complaint:  Patient presents with: Follow - Up: B/P    HPI:  This is a 49year old male patient presenting for Follow - Up (B/P)    Patient admitted overnight for chest pain and frequent PVCs about a month ago.  At that time, noted to have frequent P times a week   • Heart palpitations     Idiopathic diagnosis   • Heartburn     Occasional   • Hemorrhoids    • High blood pressure    • Indigestion     Occasioal   • Irregular bowel habits    • Kidney stones    • Mass of left thigh 4/16/2019   • Migraines tablet 0     Allergies:    Bactrim Ds              HIVES  Bactrim [Sulfametho*    RASH  Levofloxacin            NAUSEA ONLY    Comment:Other reaction(s): NAUSEA    EXAM:   07/02/21  0741   BP: 128/80   BP Location: Right arm   Pulse: 84   Resp: 16   Temp: derm.   -     DERM - INTERNAL    Concerning signs and symptoms that warrant returning to the clinic reviewed and patient demonstrated understanding.   Barrett Burnham DO  7/2/2021 7:54 AM  Family Medicine

## 2021-09-13 ENCOUNTER — HOSPITAL ENCOUNTER (EMERGENCY)
Facility: HOSPITAL | Age: 56
Discharge: HOME | End: 2021-09-13
Attending: EMERGENCY MEDICINE
Payer: COMMERCIAL

## 2021-09-13 ENCOUNTER — APPOINTMENT (OUTPATIENT)
Dept: CT IMAGING | Facility: HOSPITAL | Age: 56
End: 2021-09-13
Attending: EMERGENCY MEDICINE
Payer: COMMERCIAL

## 2021-09-13 VITALS
TEMPERATURE: 98 F | SYSTOLIC BLOOD PRESSURE: 141 MMHG | WEIGHT: 270 LBS | RESPIRATION RATE: 18 BRPM | HEIGHT: 72 IN | OXYGEN SATURATION: 96 % | HEART RATE: 68 BPM | BODY MASS INDEX: 36.57 KG/M2 | DIASTOLIC BLOOD PRESSURE: 90 MMHG

## 2021-09-13 DIAGNOSIS — R10.32 ABDOMINAL PAIN, LEFT LOWER QUADRANT: Primary | ICD-10-CM

## 2021-09-13 LAB
ALBUMIN SERPL-MCNC: 3.8 G/DL (ref 3.4–5)
ALBUMIN/GLOB SERPL: 0.9 {RATIO} (ref 1–2)
ALP LIVER SERPL-CCNC: 61 U/L
ALT SERPL-CCNC: 33 U/L
ANION GAP SERPL CALC-SCNC: 5 MMOL/L (ref 0–18)
AST SERPL-CCNC: 16 U/L (ref 15–37)
BASOPHILS # BLD AUTO: 0.03 X10(3) UL (ref 0–0.2)
BASOPHILS NFR BLD AUTO: 0.4 %
BILIRUB SERPL-MCNC: 0.9 MG/DL (ref 0.1–2)
BILIRUB UR QL STRIP.AUTO: NEGATIVE
BUN BLD-MCNC: 9 MG/DL (ref 7–18)
CALCIUM BLD-MCNC: 9.3 MG/DL (ref 8.5–10.1)
CHLORIDE SERPL-SCNC: 107 MMOL/L (ref 98–112)
CLARITY UR REFRACT.AUTO: CLEAR
CO2 SERPL-SCNC: 29 MMOL/L (ref 21–32)
COLOR UR AUTO: YELLOW
CREAT BLD-MCNC: 0.83 MG/DL
EOSINOPHIL # BLD AUTO: 0.05 X10(3) UL (ref 0–0.7)
EOSINOPHIL NFR BLD AUTO: 0.7 %
ERYTHROCYTE [DISTWIDTH] IN BLOOD BY AUTOMATED COUNT: 11.9 %
GLOBULIN PLAS-MCNC: 4.1 G/DL (ref 2.8–4.4)
GLUCOSE BLD-MCNC: 96 MG/DL (ref 70–99)
GLUCOSE UR STRIP.AUTO-MCNC: NEGATIVE MG/DL
HCT VFR BLD AUTO: 45.1 %
HGB BLD-MCNC: 15 G/DL
IMM GRANULOCYTES # BLD AUTO: 0.01 X10(3) UL (ref 0–1)
IMM GRANULOCYTES NFR BLD: 0.1 %
KETONES UR STRIP.AUTO-MCNC: NEGATIVE MG/DL
LEUKOCYTE ESTERASE UR QL STRIP.AUTO: NEGATIVE
LIPASE SERPL-CCNC: 74 U/L (ref 73–393)
LYMPHOCYTES # BLD AUTO: 3.01 X10(3) UL (ref 1–4)
LYMPHOCYTES NFR BLD AUTO: 40 %
M PROTEIN MFR SERPL ELPH: 7.9 G/DL (ref 6.4–8.2)
MCH RBC QN AUTO: 30.7 PG (ref 26–34)
MCHC RBC AUTO-ENTMCNC: 33.3 G/DL (ref 31–37)
MCV RBC AUTO: 92.2 FL
MONOCYTES # BLD AUTO: 0.67 X10(3) UL (ref 0.1–1)
MONOCYTES NFR BLD AUTO: 8.9 %
NEUTROPHILS # BLD AUTO: 3.75 X10 (3) UL (ref 1.5–7.7)
NEUTROPHILS # BLD AUTO: 3.75 X10(3) UL (ref 1.5–7.7)
NEUTROPHILS NFR BLD AUTO: 49.9 %
NITRITE UR QL STRIP.AUTO: NEGATIVE
OSMOLALITY SERPL CALC.SUM OF ELEC: 291 MOSM/KG (ref 275–295)
PH UR STRIP.AUTO: 6 [PH] (ref 5–8)
PLATELET # BLD AUTO: 180 10(3)UL (ref 150–450)
POTASSIUM SERPL-SCNC: 4.2 MMOL/L (ref 3.5–5.1)
PROT UR STRIP.AUTO-MCNC: NEGATIVE MG/DL
RBC # BLD AUTO: 4.89 X10(6)UL
RBC UR QL AUTO: NEGATIVE
SODIUM SERPL-SCNC: 141 MMOL/L (ref 136–145)
SP GR UR STRIP.AUTO: 1 (ref 1–1.03)
UROBILINOGEN UR STRIP.AUTO-MCNC: <2 MG/DL
WBC # BLD AUTO: 7.5 X10(3) UL (ref 4–11)

## 2021-09-13 PROCEDURE — 85025 COMPLETE CBC W/AUTO DIFF WBC: CPT | Performed by: EMERGENCY MEDICINE

## 2021-09-13 PROCEDURE — 81003 URINALYSIS AUTO W/O SCOPE: CPT | Performed by: EMERGENCY MEDICINE

## 2021-09-13 PROCEDURE — 74177 CT ABD & PELVIS W/CONTRAST: CPT | Performed by: EMERGENCY MEDICINE

## 2021-09-13 PROCEDURE — 36415 COLL VENOUS BLD VENIPUNCTURE: CPT

## 2021-09-13 PROCEDURE — 83690 ASSAY OF LIPASE: CPT | Performed by: EMERGENCY MEDICINE

## 2021-09-13 PROCEDURE — 99284 EMERGENCY DEPT VISIT MOD MDM: CPT

## 2021-09-13 PROCEDURE — 80053 COMPREHEN METABOLIC PANEL: CPT | Performed by: EMERGENCY MEDICINE

## 2021-09-14 NOTE — ED PROVIDER NOTES
Patient Seen in: [facility] Emergency Department      History   Patient presents with:  Abdomen/Flank Pain    Stated Complaint: Abdominal pain since friday    Subjective:   HPI    Patient is a 57-year-old male comes emergency room for evaluation of Smokeless tobacco: Never Used    Vaping Use      Vaping Use: Never used    Alcohol use: Yes      Alcohol/week: 2.0 standard drinks      Types: 2 Cans of beer per week      Comment: Rarely    Drug use:  No             Review of Systems    Positive for stated LIPASE - Normal   CBC WITH DIFFERENTIAL WITH PLATELET    Narrative: The following orders were created for panel order CBC With Differential With Platelet.   Procedure                               Abnormality         Status                     ------- BOWEL/MESENTERY:  There is mild potential wall thickening involving the descending and sigmoid colon which could be related to decompression of the colon rather than true pathology.   The possibility of mild colitis is in the differential.  The small bowel for further evaluation and treatment, but to return immediately to the ER for worsening, concerning, new, changing or persisting symptoms. I discussed the case with the patient and they had no questions, complaints, or concerns.   Patient felt comfortable

## 2021-09-17 ENCOUNTER — LAB ENCOUNTER (OUTPATIENT)
Dept: LAB | Age: 56
End: 2021-09-17
Attending: DERMATOLOGY
Payer: COMMERCIAL

## 2021-09-17 DIAGNOSIS — D48.5 NEOPLASM OF UNCERTAIN BEHAVIOR OF SKIN: ICD-10-CM

## 2021-09-17 PROCEDURE — 88305 TISSUE EXAM BY PATHOLOGIST: CPT

## 2021-11-17 ENCOUNTER — TELEMEDICINE (OUTPATIENT)
Dept: FAMILY MEDICINE CLINIC | Facility: CLINIC | Age: 56
End: 2021-11-17

## 2021-11-17 DIAGNOSIS — J01.90 ACUTE NON-RECURRENT SINUSITIS, UNSPECIFIED LOCATION: Primary | ICD-10-CM

## 2021-11-17 DIAGNOSIS — R05.9 COUGH: ICD-10-CM

## 2021-11-17 PROCEDURE — 99213 OFFICE O/P EST LOW 20 MIN: CPT | Performed by: FAMILY MEDICINE

## 2021-11-17 RX ORDER — AMOXICILLIN AND CLAVULANATE POTASSIUM 875; 125 MG/1; MG/1
1 TABLET, FILM COATED ORAL 2 TIMES DAILY
Qty: 20 TABLET | Refills: 0 | Status: SHIPPED | OUTPATIENT
Start: 2021-11-17 | End: 2021-11-27

## 2021-11-17 RX ORDER — CODEINE PHOSPHATE AND GUAIFENESIN 10; 100 MG/5ML; MG/5ML
5 SOLUTION ORAL 3 TIMES DAILY PRN
Qty: 118 ML | Refills: 0 | Status: SHIPPED | OUTPATIENT
Start: 2021-11-17 | End: 2022-01-14

## 2021-11-17 NOTE — PROGRESS NOTES
VIDEO VISIT  This visit is conducted using Telemedicine with live, interactive video and audio. Patient has been referred to the [facility] website at [email or web address] to review the yearly Consent to Treat document.     Patient understands and accepts • High blood pressure    • Indigestion     Occasioal   • Irregular bowel habits    • Kidney stones    • Mass of left thigh 4/16/2019   • Migraines    • Nausea     Occasional   • Pain with bowel movements     Occasional   • Pneumonia due to organism    • (MULTI-VITAMIN/MINERALS) Oral Tab Take 1 tablet by mouth daily. • magnesium oxide 400 MG Oral Tab Take 1 tablet (400 mg total) by mouth daily.  30 tablet 0     Allergies:    Bactrim Ds              HIVES  Bactrim [Sulfametho*    RASH  Levofloxacin

## 2021-11-27 ENCOUNTER — HOSPITAL ENCOUNTER (EMERGENCY)
Facility: HOSPITAL | Age: 56
Discharge: HOME | End: 2021-11-27
Attending: EMERGENCY MEDICINE
Payer: COMMERCIAL

## 2021-11-27 ENCOUNTER — APPOINTMENT (OUTPATIENT)
Dept: CT IMAGING | Facility: HOSPITAL | Age: 56
End: 2021-11-27
Attending: EMERGENCY MEDICINE
Payer: COMMERCIAL

## 2021-11-27 VITALS
HEIGHT: 72 IN | TEMPERATURE: 98 F | HEART RATE: 72 BPM | SYSTOLIC BLOOD PRESSURE: 127 MMHG | WEIGHT: 270 LBS | RESPIRATION RATE: 16 BRPM | OXYGEN SATURATION: 98 % | DIASTOLIC BLOOD PRESSURE: 88 MMHG | BODY MASS INDEX: 36.57 KG/M2

## 2021-11-27 DIAGNOSIS — R10.9 FLANK PAIN: Primary | ICD-10-CM

## 2021-11-27 PROCEDURE — 99284 EMERGENCY DEPT VISIT MOD MDM: CPT

## 2021-11-27 PROCEDURE — 83690 ASSAY OF LIPASE: CPT | Performed by: EMERGENCY MEDICINE

## 2021-11-27 PROCEDURE — 81003 URINALYSIS AUTO W/O SCOPE: CPT | Performed by: EMERGENCY MEDICINE

## 2021-11-27 PROCEDURE — 36415 COLL VENOUS BLD VENIPUNCTURE: CPT

## 2021-11-27 PROCEDURE — 80053 COMPREHEN METABOLIC PANEL: CPT | Performed by: EMERGENCY MEDICINE

## 2021-11-27 PROCEDURE — 85025 COMPLETE CBC W/AUTO DIFF WBC: CPT | Performed by: EMERGENCY MEDICINE

## 2021-11-27 PROCEDURE — 74176 CT ABD & PELVIS W/O CONTRAST: CPT | Performed by: EMERGENCY MEDICINE

## 2021-11-27 NOTE — ED PROVIDER NOTES
Patient Seen in: [facility] Emergency Department      History   Patient presents with:  Abdomen/Flank Pain    Stated Complaint: abdominal pain x 1 week    Subjective:   HPI    71-year-old white male presents to the emergency room today for complaint Date   • COLONOSCOPY     • SINUS SURGERY                    Social History    Tobacco Use      Smoking status: Never Smoker      Smokeless tobacco: Never Used    Vaping Use      Vaping Use: Never used    Alcohol use:  Yes      Alcohol/week: 2.0 standard dri other components within normal limits   LIPASE - Normal   CBC WITH DIFFERENTIAL WITH PLATELET    Narrative: The following orders were created for panel order CBC With Differential With Platelet.   Procedure                               Abnormality unremarkable. CBC and chemistries were normal.  Urinalysis was negative. Patient has a benign exam.  Since his position of his pain in the way described could been a kidney stone was sent for CT scan to rule out kidney stone this was negative as well.   Melissa Amos

## 2021-11-28 NOTE — ED QUICK NOTES
DC instructions handed to pt. IV removed from [address] [place] [address]. No distress noted. Pt denies any further needs at this time.

## 2021-12-17 ENCOUNTER — IMMUNIZATION (OUTPATIENT)
Dept: LAB | Facility: HOSPITAL | Age: 56
End: 2021-12-17
Attending: EMERGENCY MEDICINE
Payer: COMMERCIAL

## 2021-12-17 DIAGNOSIS — Z23 NEED FOR VACCINATION: Primary | ICD-10-CM

## 2021-12-17 PROCEDURE — 0004A SARSCOV2 VAC 30MCG/0.3ML IM: CPT

## 2021-12-22 DIAGNOSIS — I49.3 PVCS (PREMATURE VENTRICULAR CONTRACTIONS): ICD-10-CM

## 2021-12-22 DIAGNOSIS — I10 BENIGN ESSENTIAL HTN: ICD-10-CM

## 2021-12-22 RX ORDER — METOPROLOL SUCCINATE 25 MG/1
TABLET, EXTENDED RELEASE ORAL
Qty: 135 TABLET | Refills: 0 | Status: SHIPPED | OUTPATIENT
Start: 2021-12-22 | End: 2022-01-14

## 2021-12-22 NOTE — TELEPHONE ENCOUNTER
Requested Prescriptions     Pending Prescriptions Disp Refills   • metoprolol succinate 25 MG Oral Tablet 24 Hr [Pharmacy Med Name: METOPROLOL SUCC ER 25 MG TAB] 135 tablet 1     Sig: TAKE 1 TABLET BY MOUTH EVERY MORNING AND 1/2 TABLET BY MOUTH AT NIGHT

## 2022-03-12 ENCOUNTER — TELEPHONE (OUTPATIENT)
Dept: FAMILY MEDICINE CLINIC | Facility: CLINIC | Age: 57
End: 2022-03-12

## 2022-03-12 NOTE — TELEPHONE ENCOUNTER
Please enter lab orders for the patient's upcoming physical appointment. Physical scheduled: Your appointments     Date & Time Appointment Department [facility])    Mar 29, 2022  5:20 PM CDT Adult Physical with Pawan AlcazarDO [address] [facility]  ([address])    PLEASE NOTE - Most insurances allow a Complete Physical once every 366 days. Please schedule accordingly. Please arrive 15 minutes prior to your scheduled appointment. Please also bring your Insurance card, Photo ID, and your medication bottles or a list of your current medication. If you no longer require this appointment, please contact your physician office to cancel. Erin Jain [address] [facility] [phone]         [facility]  [facility])     The patient has been notified to complete fasting labs prior to their physical appointment.

## 2022-03-18 ENCOUNTER — TELEMEDICINE (OUTPATIENT)
Dept: FAMILY MEDICINE CLINIC | Facility: CLINIC | Age: 57
End: 2022-03-18
Payer: COMMERCIAL

## 2022-03-18 DIAGNOSIS — G43.109 OCULAR MIGRAINE: ICD-10-CM

## 2022-03-18 DIAGNOSIS — M25.50 PAIN IN JOINT INVOLVING MULTIPLE SITES: Primary | ICD-10-CM

## 2022-03-18 PROCEDURE — 99213 OFFICE O/P EST LOW 20 MIN: CPT | Performed by: FAMILY MEDICINE

## 2022-03-21 ENCOUNTER — LAB ENCOUNTER (OUTPATIENT)
Dept: LAB | Age: 57
End: 2022-03-21
Attending: FAMILY MEDICINE
Payer: COMMERCIAL

## 2022-03-21 DIAGNOSIS — M25.50 PAIN IN JOINT INVOLVING MULTIPLE SITES: ICD-10-CM

## 2022-03-21 DIAGNOSIS — Z00.00 ROUTINE HEALTH MAINTENANCE: ICD-10-CM

## 2022-03-21 LAB
ALBUMIN SERPL-MCNC: 3.9 G/DL (ref 3.4–5)
ALBUMIN/GLOB SERPL: 1.1 {RATIO} (ref 1–2)
ALP LIVER SERPL-CCNC: 62 U/L
ALT SERPL-CCNC: 44 U/L
ANION GAP SERPL CALC-SCNC: 3 MMOL/L (ref 0–18)
AST SERPL-CCNC: 27 U/L (ref 15–37)
BASOPHILS # BLD AUTO: 0.03 X10(3) UL (ref 0–0.2)
BASOPHILS NFR BLD AUTO: 0.4 %
BILIRUB SERPL-MCNC: 0.8 MG/DL (ref 0.1–2)
BUN BLD-MCNC: 13 MG/DL (ref 7–18)
CALCIUM BLD-MCNC: 8.9 MG/DL (ref 8.5–10.1)
CHLORIDE SERPL-SCNC: 105 MMOL/L (ref 98–112)
CHOLEST SERPL-MCNC: 190 MG/DL (ref ?–200)
CO2 SERPL-SCNC: 28 MMOL/L (ref 21–32)
CREAT BLD-MCNC: 0.76 MG/DL
CRP SERPL-MCNC: <0.29 MG/DL (ref ?–0.3)
EOSINOPHIL # BLD AUTO: 0.07 X10(3) UL (ref 0–0.7)
EOSINOPHIL NFR BLD AUTO: 1 %
ERYTHROCYTE [DISTWIDTH] IN BLOOD BY AUTOMATED COUNT: 12.1 %
FASTING PATIENT LIPID ANSWER: YES
FASTING STATUS PATIENT QL REPORTED: YES
GLOBULIN PLAS-MCNC: 3.4 G/DL (ref 2.8–4.4)
GLUCOSE BLD-MCNC: 96 MG/DL (ref 70–99)
HCT VFR BLD AUTO: 47.1 %
HDLC SERPL-MCNC: 62 MG/DL (ref 40–59)
HGB BLD-MCNC: 15.3 G/DL
IMM GRANULOCYTES # BLD AUTO: 0.01 X10(3) UL (ref 0–1)
IMM GRANULOCYTES NFR BLD: 0.1 %
LDLC SERPL CALC-MCNC: 109 MG/DL (ref ?–100)
LYMPHOCYTES NFR BLD AUTO: 31.3 %
MCHC RBC AUTO-ENTMCNC: 32.5 G/DL (ref 31–37)
MCV RBC AUTO: 94 FL
MONOCYTES # BLD AUTO: 0.57 X10(3) UL (ref 0.1–1)
MONOCYTES NFR BLD AUTO: 7.9 %
NEUTROPHILS # BLD AUTO: 4.29 X10 (3) UL (ref 1.5–7.7)
NEUTROPHILS # BLD AUTO: 4.29 X10(3) UL (ref 1.5–7.7)
NEUTROPHILS NFR BLD AUTO: 59.3 %
NONHDLC SERPL-MCNC: 128 MG/DL (ref ?–130)
OSMOLALITY SERPL CALC.SUM OF ELEC: 282 MOSM/KG (ref 275–295)
PLATELET # BLD AUTO: 212 10(3)UL (ref 150–450)
POTASSIUM SERPL-SCNC: 4.1 MMOL/L (ref 3.5–5.1)
PROT SERPL-MCNC: 7.3 G/DL (ref 6.4–8.2)
RHEUMATOID FACT SERPL-ACNC: <10 IU/ML (ref ?–15)
SODIUM SERPL-SCNC: 136 MMOL/L (ref 136–145)
TRIGL SERPL-MCNC: 104 MG/DL (ref 30–149)
TSI SER-ACNC: 1.42 MIU/ML (ref 0.36–3.74)
VIT D+METAB SERPL-MCNC: 19.9 NG/ML (ref 30–100)
VLDLC SERPL CALC-MCNC: 18 MG/DL (ref 0–30)
WBC # BLD AUTO: 7.2 X10(3) UL (ref 4–11)

## 2022-03-21 PROCEDURE — 86431 RHEUMATOID FACTOR QUANT: CPT

## 2022-03-21 PROCEDURE — 36415 COLL VENOUS BLD VENIPUNCTURE: CPT

## 2022-03-21 PROCEDURE — 80053 COMPREHEN METABOLIC PANEL: CPT

## 2022-03-21 PROCEDURE — 85652 RBC SED RATE AUTOMATED: CPT

## 2022-03-21 PROCEDURE — 86200 CCP ANTIBODY: CPT

## 2022-03-21 PROCEDURE — 86140 C-REACTIVE PROTEIN: CPT

## 2022-03-21 PROCEDURE — 82306 VITAMIN D 25 HYDROXY: CPT

## 2022-03-21 PROCEDURE — 86038 ANTINUCLEAR ANTIBODIES: CPT

## 2022-03-21 PROCEDURE — 84443 ASSAY THYROID STIM HORMONE: CPT

## 2022-03-21 PROCEDURE — 80061 LIPID PANEL: CPT

## 2022-03-21 PROCEDURE — 85025 COMPLETE CBC W/AUTO DIFF WBC: CPT

## 2022-03-22 LAB — ERYTHROCYTE [SEDIMENTATION RATE] IN BLOOD: 9 MM/HR

## 2022-03-23 LAB — ANA SER QL: NEGATIVE

## 2022-03-25 LAB — CCP IGG SERPL-ACNC: 2.1 U/ML (ref 0–6.9)

## 2022-03-29 ENCOUNTER — OFFICE VISIT (OUTPATIENT)
Dept: FAMILY MEDICINE CLINIC | Facility: CLINIC | Age: 57
End: 2022-03-29
Payer: COMMERCIAL

## 2022-03-29 VITALS
RESPIRATION RATE: 18 BRPM | BODY MASS INDEX: 37.14 KG/M2 | HEIGHT: 71.5 IN | WEIGHT: 271.19 LBS | TEMPERATURE: 97 F | SYSTOLIC BLOOD PRESSURE: 138 MMHG | DIASTOLIC BLOOD PRESSURE: 86 MMHG | HEART RATE: 76 BPM

## 2022-03-29 DIAGNOSIS — R19.7 DIARRHEA, UNSPECIFIED TYPE: ICD-10-CM

## 2022-03-29 DIAGNOSIS — M25.50 PAIN IN JOINT INVOLVING MULTIPLE SITES: ICD-10-CM

## 2022-03-29 DIAGNOSIS — Z00.00 ROUTINE HEALTH MAINTENANCE: Primary | ICD-10-CM

## 2022-03-29 DIAGNOSIS — E55.9 VITAMIN D DEFICIENCY: ICD-10-CM

## 2022-03-29 DIAGNOSIS — G43.109 OCULAR MIGRAINE: ICD-10-CM

## 2022-03-29 DIAGNOSIS — Z12.11 SCREEN FOR COLON CANCER: ICD-10-CM

## 2022-03-29 PROCEDURE — 3079F DIAST BP 80-89 MM HG: CPT | Performed by: FAMILY MEDICINE

## 2022-03-29 PROCEDURE — 3075F SYST BP GE 130 - 139MM HG: CPT | Performed by: FAMILY MEDICINE

## 2022-03-29 PROCEDURE — 99214 OFFICE O/P EST MOD 30 MIN: CPT | Performed by: FAMILY MEDICINE

## 2022-03-29 PROCEDURE — 3008F BODY MASS INDEX DOCD: CPT | Performed by: FAMILY MEDICINE

## 2022-03-29 RX ORDER — GABAPENTIN 100 MG/1
100 CAPSULE ORAL 3 TIMES DAILY
Qty: 90 CAPSULE | Refills: 0 | Status: SHIPPED | OUTPATIENT
Start: 2022-03-29

## 2022-03-29 RX ORDER — ERGOCALCIFEROL 1.25 MG/1
50000 CAPSULE ORAL WEEKLY
Qty: 12 CAPSULE | Refills: 0 | Status: SHIPPED | OUTPATIENT
Start: 2022-03-29

## 2022-04-03 ENCOUNTER — HOSPITAL ENCOUNTER (OUTPATIENT)
Age: 57
Discharge: HOME | End: 2022-04-03
Payer: COMMERCIAL

## 2022-04-03 ENCOUNTER — APPOINTMENT (OUTPATIENT)
Dept: CT IMAGING | Age: 57
End: 2022-04-03
Attending: NURSE PRACTITIONER
Payer: COMMERCIAL

## 2022-04-03 VITALS
OXYGEN SATURATION: 98 % | TEMPERATURE: 98 F | BODY MASS INDEX: 36.57 KG/M2 | RESPIRATION RATE: 18 BRPM | SYSTOLIC BLOOD PRESSURE: 136 MMHG | HEIGHT: 72 IN | WEIGHT: 270 LBS | HEART RATE: 68 BPM | DIASTOLIC BLOOD PRESSURE: 78 MMHG

## 2022-04-03 DIAGNOSIS — R10.30 LOWER ABDOMINAL PAIN: Primary | ICD-10-CM

## 2022-04-03 LAB
#MXD IC: 0.5 X10ˆ3/UL (ref 0.1–1)
BUN BLD-MCNC: 12 MG/DL (ref 7–18)
CHLORIDE BLD-SCNC: 104 MMOL/L (ref 98–112)
CO2 BLD-SCNC: 23 MMOL/L (ref 21–32)
CREAT BLD-MCNC: 0.7 MG/DL
GLUCOSE BLD-MCNC: 107 MG/DL (ref 70–99)
HCT VFR BLD AUTO: 46.8 %
HCT VFR BLD CALC: 48 %
HGB BLD-MCNC: 15.5 G/DL
ISTAT IONIZED CALCIUM FOR CHEM 8: 1.17 MMOL/L (ref 1.12–1.32)
LYMPHOCYTES # BLD AUTO: 2.2 X10ˆ3/UL (ref 1–4)
LYMPHOCYTES NFR BLD AUTO: 29.9 %
MCH RBC QN AUTO: 30.8 PG (ref 26–34)
MCHC RBC AUTO-ENTMCNC: 33.1 G/DL (ref 31–37)
MCV RBC AUTO: 92.9 FL (ref 80–100)
MIXED CELL %: 6.8 %
NEUTROPHILS # BLD AUTO: 4.5 X10ˆ3/UL (ref 1.5–7.7)
NEUTROPHILS NFR BLD AUTO: 63.3 %
PLATELET # BLD AUTO: 224 X10ˆ3/UL (ref 150–450)
POCT BILIRUBIN URINE: NEGATIVE
POCT GLUCOSE URINE: NEGATIVE MG/DL
POCT KETONE URINE: NEGATIVE MG/DL
POCT LEUKOCYTE ESTERASE URINE: NEGATIVE
POCT NITRITE URINE: NEGATIVE
POCT PH URINE: 6.5 (ref 5–8)
POCT PROTEIN URINE: NEGATIVE MG/DL
POCT SPECIFIC GRAVITY URINE: 1.02
POCT URINE CLARITY: CLEAR
POCT URINE COLOR: YELLOW
POCT UROBILINOGEN URINE: 0.2 MG/DL
POTASSIUM BLD-SCNC: 3.9 MMOL/L (ref 3.6–5.1)
RBC # BLD AUTO: 5.04 X10ˆ6/UL
SODIUM BLD-SCNC: 140 MMOL/L (ref 136–145)
WBC # BLD AUTO: 7.2 X10ˆ3/UL (ref 4–11)

## 2022-04-03 PROCEDURE — 74177 CT ABD & PELVIS W/CONTRAST: CPT | Performed by: NURSE PRACTITIONER

## 2022-04-03 NOTE — ED INITIAL ASSESSMENT (HPI)
Pt here w/ c/o abd feeling like it's \"swollen\" just below the belt line. Onset Thursday night. Some nausea, bloating after he eats.

## 2022-04-26 ENCOUNTER — OFFICE VISIT (OUTPATIENT)
Dept: FAMILY MEDICINE CLINIC | Facility: CLINIC | Age: 57
End: 2022-04-26
Payer: COMMERCIAL

## 2022-04-26 VITALS
HEART RATE: 72 BPM | DIASTOLIC BLOOD PRESSURE: 76 MMHG | WEIGHT: 264.38 LBS | SYSTOLIC BLOOD PRESSURE: 124 MMHG | TEMPERATURE: 98 F | BODY MASS INDEX: 36.2 KG/M2 | HEIGHT: 71.5 IN

## 2022-04-26 DIAGNOSIS — R10.9 ABDOMINAL CRAMPING: ICD-10-CM

## 2022-04-26 DIAGNOSIS — M25.50 PAIN IN JOINT INVOLVING MULTIPLE SITES: ICD-10-CM

## 2022-04-26 DIAGNOSIS — R19.4 CHANGE IN BOWEL HABITS: ICD-10-CM

## 2022-04-26 DIAGNOSIS — N41.0 ACUTE PROSTATITIS: Primary | ICD-10-CM

## 2022-04-26 PROCEDURE — 99214 OFFICE O/P EST MOD 30 MIN: CPT | Performed by: FAMILY MEDICINE

## 2022-04-26 PROCEDURE — 3074F SYST BP LT 130 MM HG: CPT | Performed by: FAMILY MEDICINE

## 2022-04-26 PROCEDURE — 3008F BODY MASS INDEX DOCD: CPT | Performed by: FAMILY MEDICINE

## 2022-04-26 PROCEDURE — 3078F DIAST BP <80 MM HG: CPT | Performed by: FAMILY MEDICINE

## 2022-04-26 RX ORDER — CIPROFLOXACIN 500 MG/1
500 TABLET, FILM COATED ORAL 2 TIMES DAILY
Qty: 28 TABLET | Refills: 0 | Status: SHIPPED | OUTPATIENT
Start: 2022-04-26

## 2022-04-26 RX ORDER — DICYCLOMINE HYDROCHLORIDE 10 MG/1
10 CAPSULE ORAL
Qty: 40 CAPSULE | Refills: 1 | Status: SHIPPED | OUTPATIENT
Start: 2022-04-26

## 2022-05-28 ENCOUNTER — LAB ENCOUNTER (OUTPATIENT)
Dept: LAB | Age: 57
End: 2022-05-28
Attending: STUDENT IN AN ORGANIZED HEALTH CARE EDUCATION/TRAINING PROGRAM
Payer: COMMERCIAL

## 2022-05-28 DIAGNOSIS — Z01.818 PRE-OP TESTING: ICD-10-CM

## 2022-05-29 LAB — SARS-COV-2 RNA RESP QL NAA+PROBE: NOT DETECTED

## 2022-05-31 PROBLEM — Z86.010 HISTORY OF ADENOMATOUS POLYP OF COLON: Status: ACTIVE | Noted: 2022-05-31

## 2022-05-31 PROBLEM — Z12.11 SPECIAL SCREENING FOR MALIGNANT NEOPLASMS, COLON: Status: ACTIVE | Noted: 2022-05-31

## 2022-05-31 PROBLEM — Z86.0101 HISTORY OF ADENOMATOUS POLYP OF COLON: Status: ACTIVE | Noted: 2022-05-31

## 2022-05-31 PROBLEM — K63.5 POLYP OF COLON: Status: ACTIVE | Noted: 2022-05-31

## 2022-05-31 PROCEDURE — 88305 TISSUE EXAM BY PATHOLOGIST: CPT | Performed by: STUDENT IN AN ORGANIZED HEALTH CARE EDUCATION/TRAINING PROGRAM

## 2022-06-02 ENCOUNTER — TELEPHONE (OUTPATIENT)
Dept: FAMILY MEDICINE CLINIC | Facility: CLINIC | Age: 57
End: 2022-06-02

## 2022-06-02 DIAGNOSIS — N41.0 ACUTE PROSTATITIS: Primary | ICD-10-CM

## 2022-06-02 NOTE — TELEPHONE ENCOUNTER
Dr. Kirstie Singleton patient called he last saw you about a month ago. Patient is still having pain in the right testicle and back. He denies any blood in the urine. No urgency, frequency or pain when urinating.    He is asking for a referral to Dr. Didi Tavera M.D. who he has seen in the past.  Yvette Gandhi to enter referral?

## 2022-06-06 ENCOUNTER — OFFICE VISIT (OUTPATIENT)
Dept: FAMILY MEDICINE CLINIC | Facility: CLINIC | Age: 57
End: 2022-06-06
Payer: COMMERCIAL

## 2022-06-06 VITALS
SYSTOLIC BLOOD PRESSURE: 120 MMHG | BODY MASS INDEX: 37.66 KG/M2 | HEIGHT: 71 IN | TEMPERATURE: 98 F | HEART RATE: 78 BPM | DIASTOLIC BLOOD PRESSURE: 70 MMHG | RESPIRATION RATE: 16 BRPM | WEIGHT: 269 LBS

## 2022-06-06 DIAGNOSIS — N41.0 ACUTE PROSTATITIS: Primary | ICD-10-CM

## 2022-06-06 DIAGNOSIS — R10.32 BILATERAL GROIN PAIN: ICD-10-CM

## 2022-06-06 DIAGNOSIS — R10.31 BILATERAL GROIN PAIN: ICD-10-CM

## 2022-06-06 DIAGNOSIS — M25.50 PAIN IN JOINT INVOLVING MULTIPLE SITES: ICD-10-CM

## 2022-06-06 DIAGNOSIS — M54.42 ACUTE BILATERAL LOW BACK PAIN WITH LEFT-SIDED SCIATICA: ICD-10-CM

## 2022-06-06 PROCEDURE — 3078F DIAST BP <80 MM HG: CPT | Performed by: FAMILY MEDICINE

## 2022-06-06 PROCEDURE — 99214 OFFICE O/P EST MOD 30 MIN: CPT | Performed by: FAMILY MEDICINE

## 2022-06-06 PROCEDURE — 3008F BODY MASS INDEX DOCD: CPT | Performed by: FAMILY MEDICINE

## 2022-06-06 PROCEDURE — 3074F SYST BP LT 130 MM HG: CPT | Performed by: FAMILY MEDICINE

## 2022-06-07 ENCOUNTER — HOSPITAL ENCOUNTER (OUTPATIENT)
Dept: GENERAL RADIOLOGY | Age: 57
Discharge: HOME | End: 2022-06-07
Attending: FAMILY MEDICINE
Payer: COMMERCIAL

## 2022-06-07 DIAGNOSIS — M54.42 ACUTE BILATERAL LOW BACK PAIN WITH LEFT-SIDED SCIATICA: ICD-10-CM

## 2022-06-07 PROCEDURE — 72110 X-RAY EXAM L-2 SPINE 4/>VWS: CPT | Performed by: FAMILY MEDICINE

## 2022-06-10 ENCOUNTER — OFFICE VISIT (OUTPATIENT)
Dept: SURGERY | Facility: CLINIC | Age: 57
End: 2022-06-10
Payer: COMMERCIAL

## 2022-06-10 DIAGNOSIS — N41.1 CHRONIC PROSTATITIS: ICD-10-CM

## 2022-06-10 DIAGNOSIS — N45.1 LEFT EPIDIDYMITIS: ICD-10-CM

## 2022-06-10 DIAGNOSIS — R82.90 URINE FINDING: Primary | ICD-10-CM

## 2022-06-10 DIAGNOSIS — N50.82 SCROTAL PAIN: ICD-10-CM

## 2022-06-10 LAB
APPEARANCE: CLEAR
BILIRUBIN: NEGATIVE
GLUCOSE (URINE DIPSTICK): NEGATIVE MG/DL
KETONES (URINE DIPSTICK): NEGATIVE MG/DL
LEUKOCYTES: NEGATIVE
MULTISTIX LOT#: NORMAL NUMERIC
NITRITE, URINE: NEGATIVE
OCCULT BLOOD: NEGATIVE
PH, URINE: 5.5 (ref 4.5–8)
PROTEIN (URINE DIPSTICK): NEGATIVE MG/DL
SPECIFIC GRAVITY: >=1.03 (ref 1–1.03)
UROBILINOGEN,SEMI-QN: 0.2 MG/DL (ref 0–1.9)

## 2022-06-10 PROCEDURE — 81003 URINALYSIS AUTO W/O SCOPE: CPT | Performed by: UROLOGY

## 2022-06-10 PROCEDURE — 99243 OFF/OP CNSLTJ NEW/EST LOW 30: CPT | Performed by: UROLOGY

## 2022-06-10 RX ORDER — CIPROFLOXACIN 500 MG/1
500 TABLET, FILM COATED ORAL 2 TIMES DAILY
Qty: 20 TABLET | Refills: 1 | Status: SHIPPED | OUTPATIENT
Start: 2022-06-10

## 2022-06-14 DIAGNOSIS — M54.42 ACUTE BILATERAL LOW BACK PAIN WITH LEFT-SIDED SCIATICA: Primary | ICD-10-CM

## 2022-06-14 DIAGNOSIS — R10.31 BILATERAL GROIN PAIN: ICD-10-CM

## 2022-06-14 DIAGNOSIS — R10.32 BILATERAL GROIN PAIN: ICD-10-CM

## 2022-07-01 ENCOUNTER — HOSPITAL ENCOUNTER (OUTPATIENT)
Dept: MRI IMAGING | Facility: HOSPITAL | Age: 57
Discharge: HOME | End: 2022-07-01
Attending: FAMILY MEDICINE
Payer: COMMERCIAL

## 2022-07-01 ENCOUNTER — HOSPITAL ENCOUNTER (OUTPATIENT)
Dept: ULTRASOUND IMAGING | Age: 57
Discharge: HOME | End: 2022-07-01
Attending: UROLOGY
Payer: COMMERCIAL

## 2022-07-01 DIAGNOSIS — N50.82 SCROTAL PAIN: ICD-10-CM

## 2022-07-01 DIAGNOSIS — R10.31 BILATERAL GROIN PAIN: ICD-10-CM

## 2022-07-01 DIAGNOSIS — R10.32 BILATERAL GROIN PAIN: ICD-10-CM

## 2022-07-01 DIAGNOSIS — M54.42 ACUTE BILATERAL LOW BACK PAIN WITH LEFT-SIDED SCIATICA: ICD-10-CM

## 2022-07-01 PROCEDURE — 76870 US EXAM SCROTUM: CPT | Performed by: UROLOGY

## 2022-07-01 PROCEDURE — 72148 MRI LUMBAR SPINE W/O DYE: CPT | Performed by: FAMILY MEDICINE

## 2022-07-01 PROCEDURE — 93975 VASCULAR STUDY: CPT | Performed by: UROLOGY

## 2022-07-04 ENCOUNTER — PATIENT MESSAGE (OUTPATIENT)
Dept: FAMILY MEDICINE CLINIC | Facility: CLINIC | Age: 57
End: 2022-07-04

## 2022-07-04 DIAGNOSIS — R10.31 BILATERAL GROIN PAIN: ICD-10-CM

## 2022-07-04 DIAGNOSIS — R10.32 BILATERAL GROIN PAIN: ICD-10-CM

## 2022-07-04 DIAGNOSIS — M25.50 PAIN IN JOINT INVOLVING MULTIPLE SITES: ICD-10-CM

## 2022-07-04 DIAGNOSIS — M54.50 ACUTE LOW BACK PAIN, UNSPECIFIED BACK PAIN LATERALITY, UNSPECIFIED WHETHER SCIATICA PRESENT: Primary | ICD-10-CM

## 2022-07-04 DIAGNOSIS — M48.062 SPINAL STENOSIS OF LUMBAR REGION WITH NEUROGENIC CLAUDICATION: ICD-10-CM

## 2022-07-13 DIAGNOSIS — M54.42 ACUTE BILATERAL LOW BACK PAIN WITH LEFT-SIDED SCIATICA: ICD-10-CM

## 2022-07-13 DIAGNOSIS — M25.50 PAIN IN JOINT INVOLVING MULTIPLE SITES: ICD-10-CM

## 2022-07-13 DIAGNOSIS — N41.0 ACUTE PROSTATITIS: ICD-10-CM

## 2022-07-13 DIAGNOSIS — R10.32 BILATERAL GROIN PAIN: ICD-10-CM

## 2022-07-13 DIAGNOSIS — R10.31 BILATERAL GROIN PAIN: ICD-10-CM

## 2022-07-26 ENCOUNTER — OFFICE VISIT (OUTPATIENT)
Dept: SURGERY | Facility: CLINIC | Age: 57
End: 2022-07-26
Payer: COMMERCIAL

## 2022-07-26 DIAGNOSIS — N45.1 LEFT EPIDIDYMITIS: ICD-10-CM

## 2022-07-26 DIAGNOSIS — R82.90 URINE FINDING: Primary | ICD-10-CM

## 2022-07-26 LAB
APPEARANCE: CLEAR
BILIRUBIN: NEGATIVE
GLUCOSE (URINE DIPSTICK): NEGATIVE MG/DL
KETONES (URINE DIPSTICK): NEGATIVE MG/DL
LEUKOCYTES: NEGATIVE
MULTISTIX LOT#: NORMAL NUMERIC
NITRITE, URINE: NEGATIVE
OCCULT BLOOD: NEGATIVE
PH, URINE: 7 (ref 4.5–8)
PROTEIN (URINE DIPSTICK): NEGATIVE MG/DL
SPECIFIC GRAVITY: 1.01 (ref 1–1.03)
URINE-COLOR: YELLOW
UROBILINOGEN,SEMI-QN: 0.2 MG/DL (ref 0–1.9)

## 2022-07-26 PROCEDURE — 81003 URINALYSIS AUTO W/O SCOPE: CPT | Performed by: UROLOGY

## 2022-07-26 PROCEDURE — 99213 OFFICE O/P EST LOW 20 MIN: CPT | Performed by: UROLOGY

## 2022-07-26 RX ORDER — CIPROFLOXACIN 500 MG/1
500 TABLET, FILM COATED ORAL 2 TIMES DAILY
Qty: 20 TABLET | Refills: 1 | Status: SHIPPED | OUTPATIENT
Start: 2022-07-26

## 2022-08-12 ENCOUNTER — TELEPHONE (OUTPATIENT)
Dept: FAMILY MEDICINE CLINIC | Facility: CLINIC | Age: 57
End: 2022-08-12

## 2022-08-12 DIAGNOSIS — M48.062 SPINAL STENOSIS OF LUMBAR REGION WITH NEUROGENIC CLAUDICATION: Primary | ICD-10-CM

## 2022-08-12 NOTE — TELEPHONE ENCOUNTER
Reviewed OVs on 04/26/22 visit it was documented that patient was taking gabapentin and had to stop due to itching.  And also noted in Allergies list    On 6/7/22 pt completed Xr Lumbar Spine and MRI Spine on 07/01/22  Dr Jennifer Licea noted several areas of disc bulging and Pt decided to see neurosurgeon   Pt has appt w/ Dr Sylvia Zelaya 09/13/2022

## 2022-08-12 NOTE — TELEPHONE ENCOUNTER
Pt's current dose of diclofenac is 50mgs BID  C/O Current dose is not relieving pain  No notes from PT in EPIC  Pt stated PT suggested med for nerve pain  Unable to tolerate gabapentin = itching  LOV 06/06/2022  upcoming OV w/neurosergeon 09/13/22    Increase diclofenac dosage?

## 2022-08-12 NOTE — TELEPHONE ENCOUNTER
Pt calling wanting to speak to clinical regarding back/nerve pain. Currently being treated w/ Diclofenac and wants to know if he is able to take more as the pain has increased. Per physical therapist, pt may benefit from something for nerve pain. Mik Bryan

## 2022-08-13 NOTE — TELEPHONE ENCOUNTER
Continue diclofenac at current dose. Recommend trial of nortriptyline. This is a different type of medication (antidepressant) at low doses also can improve nerve related pain. Biggest side effect is sleepiness so should be taken at night. Please let me know if patient is interested. I will pend the medication below which can be sent in if you would like to start. Please let me know if you have any questions.   [address], DO 8/13/2022 1:14 PM

## 2022-08-15 RX ORDER — NORTRIPTYLINE HYDROCHLORIDE 10 MG/1
10 CAPSULE ORAL NIGHTLY
Qty: 60 CAPSULE | Refills: 0 | Status: SHIPPED | OUTPATIENT
Start: 2022-08-15

## 2022-08-15 NOTE — TELEPHONE ENCOUNTER
Patient notified. Patient verbalized understanding of information given. He is agreeable to the new medication. Sent now.

## 2022-08-26 DIAGNOSIS — N41.0 ACUTE PROSTATITIS: ICD-10-CM

## 2022-08-26 DIAGNOSIS — R10.32 BILATERAL GROIN PAIN: ICD-10-CM

## 2022-08-26 DIAGNOSIS — R10.31 BILATERAL GROIN PAIN: ICD-10-CM

## 2022-08-26 DIAGNOSIS — M25.50 PAIN IN JOINT INVOLVING MULTIPLE SITES: ICD-10-CM

## 2022-08-26 DIAGNOSIS — M54.42 ACUTE BILATERAL LOW BACK PAIN WITH LEFT-SIDED SCIATICA: ICD-10-CM

## 2022-09-07 DIAGNOSIS — M48.062 SPINAL STENOSIS OF LUMBAR REGION WITH NEUROGENIC CLAUDICATION: ICD-10-CM

## 2022-09-13 ENCOUNTER — OFFICE VISIT (OUTPATIENT)
Dept: SURGERY | Facility: CLINIC | Age: 57
End: 2022-09-13
Payer: COMMERCIAL

## 2022-09-13 ENCOUNTER — TELEPHONE (OUTPATIENT)
Dept: SURGERY | Facility: CLINIC | Age: 57
End: 2022-09-13

## 2022-09-13 ENCOUNTER — HOSPITAL ENCOUNTER (OUTPATIENT)
Dept: GENERAL RADIOLOGY | Facility: HOSPITAL | Age: 57
Discharge: HOME | End: 2022-09-13
Attending: PHYSICIAN ASSISTANT
Payer: COMMERCIAL

## 2022-09-13 VITALS — HEART RATE: 80 BPM | DIASTOLIC BLOOD PRESSURE: 82 MMHG | SYSTOLIC BLOOD PRESSURE: 124 MMHG

## 2022-09-13 DIAGNOSIS — M53.3 PAIN OF LEFT SACROILIAC JOINT: ICD-10-CM

## 2022-09-13 DIAGNOSIS — R20.2 NUMBNESS AND TINGLING: ICD-10-CM

## 2022-09-13 DIAGNOSIS — R20.0 NUMBNESS AND TINGLING: ICD-10-CM

## 2022-09-13 DIAGNOSIS — M54.16 LUMBAR RADICULOPATHY: ICD-10-CM

## 2022-09-13 DIAGNOSIS — M51.36 DDD (DEGENERATIVE DISC DISEASE), LUMBAR: ICD-10-CM

## 2022-09-13 DIAGNOSIS — M48.061 LUMBAR FORAMINAL STENOSIS: ICD-10-CM

## 2022-09-13 DIAGNOSIS — M51.36 BULGING LUMBAR DISC: ICD-10-CM

## 2022-09-13 DIAGNOSIS — M54.16 LUMBAR RADICULOPATHY: Primary | ICD-10-CM

## 2022-09-13 PROCEDURE — 3079F DIAST BP 80-89 MM HG: CPT | Performed by: PHYSICIAN ASSISTANT

## 2022-09-13 PROCEDURE — 99214 OFFICE O/P EST MOD 30 MIN: CPT | Performed by: PHYSICIAN ASSISTANT

## 2022-09-13 PROCEDURE — 72110 X-RAY EXAM L-2 SPINE 4/>VWS: CPT | Performed by: PHYSICIAN ASSISTANT

## 2022-09-13 PROCEDURE — 3074F SYST BP LT 130 MM HG: CPT | Performed by: PHYSICIAN ASSISTANT

## 2022-09-13 RX ORDER — NORTRIPTYLINE HYDROCHLORIDE 10 MG/1
10 CAPSULE ORAL NIGHTLY
Qty: 60 CAPSULE | Refills: 0 | Status: SHIPPED | OUTPATIENT
Start: 2022-09-13

## 2022-09-13 NOTE — PROGRESS NOTES
Pt referred by PCP     MRI of spine completed on 7/1/22  Xray of Lumbar spine completed on 6/7/222    LBP pain on nortriptaline for pain and it is  helping     No medication- 6/10  Pain   With medication- 1/10 pain with a pressure feeling and denies leg pain    Pain from Lower back to left butt cheeck and left theith pt reports muscles in those areas  feels like constant spasming.

## 2022-10-07 ENCOUNTER — OFFICE VISIT (OUTPATIENT)
Dept: PHYSICAL MEDICINE AND REHAB | Facility: CLINIC | Age: 57
End: 2022-10-07
Payer: COMMERCIAL

## 2022-10-07 VITALS
WEIGHT: 278.81 LBS | BODY MASS INDEX: 39.03 KG/M2 | OXYGEN SATURATION: 97 % | HEIGHT: 71 IN | SYSTOLIC BLOOD PRESSURE: 132 MMHG | DIASTOLIC BLOOD PRESSURE: 90 MMHG

## 2022-10-07 DIAGNOSIS — M54.42 CHRONIC LEFT-SIDED LOW BACK PAIN WITH LEFT-SIDED SCIATICA: Primary | ICD-10-CM

## 2022-10-07 DIAGNOSIS — M47.816 LUMBAR SPONDYLOSIS: ICD-10-CM

## 2022-10-07 DIAGNOSIS — G89.29 CHRONIC LEFT-SIDED LOW BACK PAIN WITH LEFT-SIDED SCIATICA: Primary | ICD-10-CM

## 2022-10-07 PROCEDURE — 3008F BODY MASS INDEX DOCD: CPT | Performed by: PHYSICAL MEDICINE & REHABILITATION

## 2022-10-07 PROCEDURE — 3075F SYST BP GE 130 - 139MM HG: CPT | Performed by: PHYSICAL MEDICINE & REHABILITATION

## 2022-10-07 PROCEDURE — 3080F DIAST BP >= 90 MM HG: CPT | Performed by: PHYSICAL MEDICINE & REHABILITATION

## 2022-10-07 PROCEDURE — 99204 OFFICE O/P NEW MOD 45 MIN: CPT | Performed by: PHYSICAL MEDICINE & REHABILITATION

## 2022-10-07 RX ORDER — CYCLOBENZAPRINE HCL 10 MG
10 TABLET ORAL NIGHTLY
Qty: 30 TABLET | Refills: 2 | Status: SHIPPED | OUTPATIENT
Start: 2022-10-07

## 2022-10-07 NOTE — PATIENT INSTRUCTIONS
1. Start Flexeril at night, this may make you drowsy, if needed you can break the tab in half. 2. See me back in 3 months or sooner if needed, at that time we can check in on your progress, and if needed discuss interventional options if indicated.

## 2022-10-09 DIAGNOSIS — M48.062 SPINAL STENOSIS OF LUMBAR REGION WITH NEUROGENIC CLAUDICATION: ICD-10-CM

## 2022-10-13 RX ORDER — NORTRIPTYLINE HYDROCHLORIDE 10 MG/1
10 CAPSULE ORAL NIGHTLY
Qty: 60 CAPSULE | Refills: 0 | Status: SHIPPED | OUTPATIENT
Start: 2022-10-13

## 2022-10-31 ENCOUNTER — TELEPHONE (OUTPATIENT)
Dept: FAMILY MEDICINE CLINIC | Facility: CLINIC | Age: 57
End: 2022-10-31

## 2022-10-31 DIAGNOSIS — L82.0 INFLAMED SEBORRHEIC KERATOSIS: Primary | ICD-10-CM

## 2022-10-31 DIAGNOSIS — Z12.83 SKIN EXAM, SCREENING FOR CANCER: ICD-10-CM

## 2022-12-16 ENCOUNTER — LAB ENCOUNTER (OUTPATIENT)
Dept: LAB | Age: 57
End: 2022-12-16
Attending: DERMATOLOGY
Payer: COMMERCIAL

## 2022-12-16 DIAGNOSIS — D48.5 NEOPLASM OF UNCERTAIN BEHAVIOR OF SKIN: ICD-10-CM

## 2022-12-16 PROCEDURE — 88305 TISSUE EXAM BY PATHOLOGIST: CPT

## 2023-02-10 ENCOUNTER — OFFICE VISIT (OUTPATIENT)
Dept: FAMILY MEDICINE CLINIC | Facility: CLINIC | Age: 58
End: 2023-02-10
Payer: COMMERCIAL

## 2023-02-10 VITALS
DIASTOLIC BLOOD PRESSURE: 70 MMHG | HEART RATE: 70 BPM | BODY MASS INDEX: 39.2 KG/M2 | SYSTOLIC BLOOD PRESSURE: 122 MMHG | RESPIRATION RATE: 16 BRPM | WEIGHT: 280 LBS | TEMPERATURE: 97 F | HEIGHT: 71 IN

## 2023-02-10 DIAGNOSIS — J01.10 SUBACUTE FRONTAL SINUSITIS: Primary | ICD-10-CM

## 2023-02-10 DIAGNOSIS — Z00.00 ROUTINE HEALTH MAINTENANCE: ICD-10-CM

## 2023-02-10 DIAGNOSIS — Z80.8 FAMILY HX OF MELANOMA: ICD-10-CM

## 2023-02-10 DIAGNOSIS — M48.062 SPINAL STENOSIS OF LUMBAR REGION WITH NEUROGENIC CLAUDICATION: ICD-10-CM

## 2023-02-10 DIAGNOSIS — I10 BENIGN ESSENTIAL HTN: ICD-10-CM

## 2023-02-10 DIAGNOSIS — E55.9 VITAMIN D DEFICIENCY: ICD-10-CM

## 2023-02-10 DIAGNOSIS — I49.3 PVCS (PREMATURE VENTRICULAR CONTRACTIONS): ICD-10-CM

## 2023-02-10 DIAGNOSIS — G95.9 CERVICAL MYELOPATHY (HCC): ICD-10-CM

## 2023-02-10 PROCEDURE — 3008F BODY MASS INDEX DOCD: CPT | Performed by: FAMILY MEDICINE

## 2023-02-10 PROCEDURE — 3078F DIAST BP <80 MM HG: CPT | Performed by: FAMILY MEDICINE

## 2023-02-10 PROCEDURE — 99214 OFFICE O/P EST MOD 30 MIN: CPT | Performed by: FAMILY MEDICINE

## 2023-02-10 PROCEDURE — 3074F SYST BP LT 130 MM HG: CPT | Performed by: FAMILY MEDICINE

## 2023-02-10 RX ORDER — AMOXICILLIN AND CLAVULANATE POTASSIUM 875; 125 MG/1; MG/1
1 TABLET, FILM COATED ORAL 2 TIMES DAILY
Qty: 20 TABLET | Refills: 0 | Status: SHIPPED | OUTPATIENT
Start: 2023-02-10 | End: 2023-02-20

## 2023-02-10 RX ORDER — DILTIAZEM HYDROCHLORIDE 180 MG/1
180 CAPSULE, COATED, EXTENDED RELEASE ORAL DAILY
Qty: 90 CAPSULE | Refills: 1 | Status: SHIPPED | OUTPATIENT
Start: 2023-02-10

## 2023-02-10 RX ORDER — PREDNISONE 20 MG/1
40 TABLET ORAL DAILY
Qty: 10 TABLET | Refills: 0 | Status: SHIPPED | OUTPATIENT
Start: 2023-02-10 | End: 2023-02-15

## 2023-04-23 ENCOUNTER — APPOINTMENT (OUTPATIENT)
Dept: GENERAL RADIOLOGY | Facility: HOSPITAL | Age: 58
End: 2023-04-23
Attending: EMERGENCY MEDICINE
Payer: COMMERCIAL

## 2023-04-23 ENCOUNTER — HOSPITAL ENCOUNTER (OUTPATIENT)
Facility: HOSPITAL | Age: 58
Setting detail: OBSERVATION
Discharge: HOME | End: 2023-04-24
Attending: EMERGENCY MEDICINE | Admitting: HOSPITALIST
Payer: COMMERCIAL

## 2023-04-23 DIAGNOSIS — R61 DIAPHORESIS: ICD-10-CM

## 2023-04-23 DIAGNOSIS — R07.9 CHEST PAIN WITH HIGH RISK FOR CARDIAC ETIOLOGY: Primary | ICD-10-CM

## 2023-04-23 LAB
ALBUMIN SERPL-MCNC: 3.8 G/DL (ref 3.4–5)
ALBUMIN/GLOB SERPL: 0.9 {RATIO} (ref 1–2)
ALP LIVER SERPL-CCNC: 69 U/L
ALT SERPL-CCNC: 40 U/L
ANION GAP SERPL CALC-SCNC: 7 MMOL/L (ref 0–18)
AST SERPL-CCNC: 24 U/L (ref 15–37)
BASOPHILS # BLD AUTO: 0.03 X10(3) UL (ref 0–0.2)
BASOPHILS NFR BLD AUTO: 0.4 %
BILIRUB SERPL-MCNC: 0.9 MG/DL (ref 0.1–2)
BILIRUB UR QL STRIP.AUTO: NEGATIVE
BUN BLD-MCNC: 19 MG/DL (ref 7–18)
CALCIUM BLD-MCNC: 9.1 MG/DL (ref 8.5–10.1)
CHLORIDE SERPL-SCNC: 106 MMOL/L (ref 98–112)
CO2 SERPL-SCNC: 26 MMOL/L (ref 21–32)
COLOR UR AUTO: YELLOW
CREAT BLD-MCNC: 1.14 MG/DL
D DIMER PPP FEU-MCNC: <0.27 UG/ML FEU (ref ?–0.56)
EOSINOPHIL # BLD AUTO: 0.04 X10(3) UL (ref 0–0.7)
EOSINOPHIL NFR BLD AUTO: 0.5 %
ERYTHROCYTE [DISTWIDTH] IN BLOOD BY AUTOMATED COUNT: 11.9 %
GFR SERPLBLD BASED ON 1.73 SQ M-ARVRAT: 75 ML/MIN/1.73M2 (ref 60–?)
GLOBULIN PLAS-MCNC: 4.3 G/DL (ref 2.8–4.4)
GLUCOSE BLD-MCNC: 131 MG/DL (ref 70–99)
GLUCOSE UR STRIP.AUTO-MCNC: NEGATIVE MG/DL
HCT VFR BLD AUTO: 49 %
HGB BLD-MCNC: 16.9 G/DL
IMM GRANULOCYTES # BLD AUTO: 0.02 X10(3) UL (ref 0–1)
IMM GRANULOCYTES NFR BLD: 0.3 %
LEUKOCYTE ESTERASE UR QL STRIP.AUTO: NEGATIVE
LYMPHOCYTES # BLD AUTO: 2.69 X10(3) UL (ref 1–4)
LYMPHOCYTES NFR BLD AUTO: 34 %
MCH RBC QN AUTO: 30.9 PG (ref 26–34)
MCHC RBC AUTO-ENTMCNC: 34.5 G/DL (ref 31–37)
MCV RBC AUTO: 89.6 FL
MONOCYTES # BLD AUTO: 0.47 X10(3) UL (ref 0.1–1)
MONOCYTES NFR BLD AUTO: 5.9 %
NEUTROPHILS # BLD AUTO: 4.67 X10 (3) UL (ref 1.5–7.7)
NEUTROPHILS # BLD AUTO: 4.67 X10(3) UL (ref 1.5–7.7)
NEUTROPHILS NFR BLD AUTO: 58.9 %
NITRITE UR QL STRIP.AUTO: NEGATIVE
OSMOLALITY SERPL CALC.SUM OF ELEC: 292 MOSM/KG (ref 275–295)
PH UR STRIP.AUTO: 5 [PH] (ref 5–8)
PLATELET # BLD AUTO: 214 10(3)UL (ref 150–450)
POTASSIUM SERPL-SCNC: 3.6 MMOL/L (ref 3.5–5.1)
PROT SERPL-MCNC: 8.1 G/DL (ref 6.4–8.2)
PROT UR STRIP.AUTO-MCNC: NEGATIVE MG/DL
RBC # BLD AUTO: 5.47 X10(6)UL
RBC UR QL AUTO: NEGATIVE
SODIUM SERPL-SCNC: 139 MMOL/L (ref 136–145)
SP GR UR STRIP.AUTO: 1.03 (ref 1–1.03)
TROPONIN I HIGH SENSITIVITY: 7 NG/L
UROBILINOGEN UR STRIP.AUTO-MCNC: 2 MG/DL
WBC # BLD AUTO: 7.9 X10(3) UL (ref 4–11)

## 2023-04-23 PROCEDURE — 71045 X-RAY EXAM CHEST 1 VIEW: CPT | Performed by: EMERGENCY MEDICINE

## 2023-04-23 PROCEDURE — 99223 1ST HOSP IP/OBS HIGH 75: CPT | Performed by: HOSPITALIST

## 2023-04-23 RX ORDER — ENOXAPARIN SODIUM 100 MG/ML
40 INJECTION SUBCUTANEOUS DAILY
Status: DISCONTINUED | OUTPATIENT
Start: 2023-04-23 | End: 2023-04-24

## 2023-04-23 RX ORDER — MULTIPLE VITAMINS W/ MINERALS TAB 9MG-400MCG
1 TAB ORAL
Status: DISCONTINUED | OUTPATIENT
Start: 2023-04-24 | End: 2023-04-24

## 2023-04-23 RX ORDER — ASPIRIN 81 MG/1
324 TABLET, CHEWABLE ORAL ONCE
Status: COMPLETED | OUTPATIENT
Start: 2023-04-23 | End: 2023-04-23

## 2023-04-23 RX ORDER — ONDANSETRON 2 MG/ML
4 INJECTION INTRAMUSCULAR; INTRAVENOUS EVERY 6 HOURS PRN
Status: DISCONTINUED | OUTPATIENT
Start: 2023-04-23 | End: 2023-04-24

## 2023-04-23 RX ORDER — POTASSIUM CHLORIDE 20 MEQ/1
40 TABLET, EXTENDED RELEASE ORAL EVERY 4 HOURS
Status: COMPLETED | OUTPATIENT
Start: 2023-04-23 | End: 2023-04-23

## 2023-04-23 RX ORDER — NITROGLYCERIN 0.4 MG/1
0.4 TABLET SUBLINGUAL ONCE
Status: COMPLETED | OUTPATIENT
Start: 2023-04-23 | End: 2023-04-23

## 2023-04-23 RX ORDER — PROCHLORPERAZINE EDISYLATE 5 MG/ML
5 INJECTION INTRAMUSCULAR; INTRAVENOUS EVERY 8 HOURS PRN
Status: DISCONTINUED | OUTPATIENT
Start: 2023-04-23 | End: 2023-04-24

## 2023-04-23 RX ORDER — MAGNESIUM OXIDE 400 MG/1
400 TABLET ORAL NIGHTLY
Status: DISCONTINUED | OUTPATIENT
Start: 2023-04-23 | End: 2023-04-24

## 2023-04-23 RX ORDER — ACETAMINOPHEN 500 MG
500 TABLET ORAL EVERY 4 HOURS PRN
Status: DISCONTINUED | OUTPATIENT
Start: 2023-04-23 | End: 2023-04-24

## 2023-04-23 RX ORDER — DILTIAZEM HYDROCHLORIDE 180 MG/1
180 CAPSULE, EXTENDED RELEASE ORAL DAILY
Status: DISCONTINUED | OUTPATIENT
Start: 2023-04-24 | End: 2023-04-24

## 2023-04-23 RX ORDER — METOPROLOL SUCCINATE 25 MG/1
25 TABLET, EXTENDED RELEASE ORAL
Status: DISCONTINUED | OUTPATIENT
Start: 2023-04-24 | End: 2023-04-24

## 2023-04-23 NOTE — ED INITIAL ASSESSMENT (HPI)
Pt reports left upper chest pressure that started a half hour ago. Pt reports he was eating breakfast when it happened. States he started sweating and felt his heart racing. Hx of PVCs.

## 2023-04-23 NOTE — ED QUICK NOTES
Orders for admission, patient is aware of plan and ready to go upstairs. Any questions, please call ED RN Eulogio Cancino at extension [phone].      Patient Covid vaccination status: Fully vaccinated     COVID Test Ordered in ED: None    COVID Suspicion at Admission: N/A    Running Infusions:  None    Mental Status/LOC at time of transport: a/ox4    Other pertinent information:   CIWA score: N/A   NIH score:  N/A

## 2023-04-23 NOTE — PROGRESS NOTES
04/23/23 1608 04/23/23 1609 04/23/23 1612   Vital Signs   Pulse 79 85 91   Heart Rate Source Monitor Monitor Monitor   /86 (!) 125/93 109/86   MAP (mmHg) 95 101 92   BP Location Right arm Right arm Right arm   BP Method Automatic Automatic Automatic   Patient Position Lying Sitting Standing

## 2023-04-23 NOTE — PLAN OF CARE
Pt. Alert and Oriented on room air. NSR on tele with occasional PVC's. Patient denies chest pain. Potassium replaced per protocol. Echo and serial troponin's ordered. Pt. And family updated on plan of care. Call light within reach.

## 2023-04-24 ENCOUNTER — APPOINTMENT (OUTPATIENT)
Dept: CV DIAGNOSTICS | Facility: HOSPITAL | Age: 58
End: 2023-04-24
Attending: INTERNAL MEDICINE
Payer: COMMERCIAL

## 2023-04-24 ENCOUNTER — APPOINTMENT (OUTPATIENT)
Dept: CV DIAGNOSTICS | Facility: HOSPITAL | Age: 58
End: 2023-04-24
Attending: HOSPITALIST
Payer: COMMERCIAL

## 2023-04-24 VITALS
OXYGEN SATURATION: 98 % | BODY MASS INDEX: 38.22 KG/M2 | WEIGHT: 282.19 LBS | HEART RATE: 76 BPM | HEIGHT: 72 IN | TEMPERATURE: 98 F | SYSTOLIC BLOOD PRESSURE: 137 MMHG | DIASTOLIC BLOOD PRESSURE: 94 MMHG | RESPIRATION RATE: 17 BRPM

## 2023-04-24 LAB
ATRIAL RATE: 90 BPM
P AXIS: 40 DEGREES
P-R INTERVAL: 188 MS
POTASSIUM SERPL-SCNC: 3.8 MMOL/L (ref 3.5–5.1)
Q-T INTERVAL: 372 MS
QRS DURATION: 104 MS
QTC CALCULATION (BEZET): 455 MS
R AXIS: 48 DEGREES
T AXIS: 23 DEGREES
TROPONIN I HIGH SENSITIVITY: 7 NG/L
TROPONIN I HIGH SENSITIVITY: 8 NG/L
VENTRICULAR RATE: 90 BPM

## 2023-04-24 PROCEDURE — 93306 TTE W/DOPPLER COMPLETE: CPT | Performed by: HOSPITALIST

## 2023-04-24 PROCEDURE — 93018 CV STRESS TEST I&R ONLY: CPT | Performed by: INTERNAL MEDICINE

## 2023-04-24 PROCEDURE — 99239 HOSP IP/OBS DSCHRG MGMT >30: CPT | Performed by: HOSPITALIST

## 2023-04-24 PROCEDURE — 93017 CV STRESS TEST TRACING ONLY: CPT | Performed by: INTERNAL MEDICINE

## 2023-04-24 PROCEDURE — 78452 HT MUSCLE IMAGE SPECT MULT: CPT | Performed by: INTERNAL MEDICINE

## 2023-04-24 RX ORDER — REGADENOSON 0.08 MG/ML
INJECTION, SOLUTION INTRAVENOUS
Status: COMPLETED
Start: 2023-04-24 | End: 2023-04-24

## 2023-04-24 RX ORDER — POTASSIUM CHLORIDE 20 MEQ/1
40 TABLET, EXTENDED RELEASE ORAL ONCE
Status: COMPLETED | OUTPATIENT
Start: 2023-04-24 | End: 2023-04-24

## 2023-04-24 NOTE — PLAN OF CARE
Preliminary nuclear stress test results as called to me by radiology:    LVEF 62% and negative for perfusion abnormalities. Echo without significant abnormalities. CV status stable for DC when ok with other services.      Ayad Thomas, NATE

## 2023-04-24 NOTE — PROGRESS NOTES
Cardiac Diagnostics:    Nuclear lexiscan stress injection completed. Pt tolerated it well. Awaiting nuclear imaging. no

## 2023-04-24 NOTE — PLAN OF CARE
Assumed pt care at [address] [facility]. A&O x4. Tele rhythm NSR/Sinus bradycardia. SPO2 95% on room air. Breath sounds clear bilaterally. Pt voiding with no issues. No c/o chest pain or shortness of breath. Skin dry and intact. Bed is locked and in low position. Call light and personal items within reach. Reviewed plan of care and patient verbalizes understanding. POC: Serial troponin q8, ECHO 4/24  Problem: Patient/Family Goals  Goal: Patient/Family Long Term Goal  Description: Patient's Long Term Goal: Manage cardiac health, alleviate chest pain    Interventions:  - Cardiology to evaluate  - See additional Care Plan goals for specific interventions  Outcome: Progressing  Goal: Patient/Family Short Term Goal  Description: Patient's Short Term Goal: Find out cause of chest pain    Interventions:   - ECHO, serial troponin, cardiology to see  - See additional Care Plan goals for specific interventions  Outcome: Progressing     Problem: CARDIOVASCULAR - ADULT  Goal: Maintains optimal cardiac output and hemodynamic stability  Description: INTERVENTIONS:  - Monitor vital signs, rhythm, and trends  - Monitor for bleeding, hypotension and signs of decreased cardiac output  - Evaluate effectiveness of vasoactive medications to optimize hemodynamic stability  - Monitor arterial and/or venous puncture sites for bleeding and/or hematoma  - Assess quality of pulses, skin color and temperature  - Assess for signs of decreased coronary artery perfusion - ex.  Angina  - Evaluate fluid balance, assess for edema, trend weights  Outcome: Progressing  Goal: Absence of cardiac arrhythmias or at baseline  Description: INTERVENTIONS:  - Continuous cardiac monitoring, monitor vital signs, obtain 12 lead EKG if indicated  - Evaluate effectiveness of antiarrhythmic and heart rate control medications as ordered  - Initiate emergency measures for life threatening arrhythmias  - Monitor electrolytes and administer replacement therapy as ordered  Outcome: Progressing

## 2023-04-24 NOTE — PLAN OF CARE
Pt alert and oriented x 4. Continuous tele monitoring in place. NSR on the monitor. Reports mild chest discomfort 2/10 but no pain. Denies dyspnea and dizziness. Room air. Steady gait. Echo and stress test pending. Safety and comfort maintained. Will continue to monitor. Problem: CARDIOVASCULAR - ADULT  Goal: Maintains optimal cardiac output and hemodynamic stability  Description: INTERVENTIONS:  - Monitor vital signs, rhythm, and trends  - Monitor for bleeding, hypotension and signs of decreased cardiac output  - Evaluate effectiveness of vasoactive medications to optimize hemodynamic stability  - Monitor arterial and/or venous puncture sites for bleeding and/or hematoma  - Assess quality of pulses, skin color and temperature  - Assess for signs of decreased coronary artery perfusion - ex.  Angina  - Evaluate fluid balance, assess for edema, trend weights  Outcome: Progressing  Goal: Absence of cardiac arrhythmias or at baseline  Description: INTERVENTIONS:  - Continuous cardiac monitoring, monitor vital signs, obtain 12 lead EKG if indicated  - Evaluate effectiveness of antiarrhythmic and heart rate control medications as ordered  - Initiate emergency measures for life threatening arrhythmias  - Monitor electrolytes and administer replacement therapy as ordered  Outcome: Progressing

## 2023-04-24 NOTE — PROGRESS NOTES
Pt cleared for dc by cardiology and hospitalist for dc to home via private vehicle. Pt agreeable to dc. DC paperwork reviewed with pt including follow up and medication regimen. Pt verbalized understanding. Awaiting ride by wife to transport home. IV removed. Will removed tele prior to dc.

## 2023-04-25 ENCOUNTER — PATIENT OUTREACH (OUTPATIENT)
Dept: CASE MANAGEMENT | Age: 58
End: 2023-04-25

## 2023-04-25 NOTE — PROGRESS NOTES
CHANDLER[facility] for post hospital follow up. [facility] contact information provided as well as [facility] office number, [phone].

## 2023-05-12 ENCOUNTER — LAB ENCOUNTER (OUTPATIENT)
Dept: LAB | Age: 58
End: 2023-05-12
Attending: FAMILY MEDICINE
Payer: COMMERCIAL

## 2023-05-12 DIAGNOSIS — E55.9 VITAMIN D DEFICIENCY: ICD-10-CM

## 2023-05-12 DIAGNOSIS — Z00.00 ROUTINE HEALTH MAINTENANCE: ICD-10-CM

## 2023-05-12 LAB
ALBUMIN SERPL-MCNC: 4.1 G/DL (ref 3.4–5)
ALBUMIN/GLOB SERPL: 1.1 {RATIO} (ref 1–2)
ALP LIVER SERPL-CCNC: 57 U/L
ALT SERPL-CCNC: 34 U/L
ANION GAP SERPL CALC-SCNC: 2 MMOL/L (ref 0–18)
AST SERPL-CCNC: 20 U/L (ref 15–37)
BASOPHILS # BLD AUTO: 0.02 X10(3) UL (ref 0–0.2)
BASOPHILS NFR BLD AUTO: 0.3 %
BILIRUB SERPL-MCNC: 0.9 MG/DL (ref 0.1–2)
BUN BLD-MCNC: 12 MG/DL (ref 7–18)
CALCIUM BLD-MCNC: 9.3 MG/DL (ref 8.5–10.1)
CHLORIDE SERPL-SCNC: 108 MMOL/L (ref 98–112)
CHOLEST SERPL-MCNC: 184 MG/DL (ref ?–200)
CO2 SERPL-SCNC: 27 MMOL/L (ref 21–32)
COMPLEXED PSA SERPL-MCNC: 2.34 NG/ML (ref ?–4)
CREAT BLD-MCNC: 0.82 MG/DL
EOSINOPHIL # BLD AUTO: 0.06 X10(3) UL (ref 0–0.7)
EOSINOPHIL NFR BLD AUTO: 0.9 %
ERYTHROCYTE [DISTWIDTH] IN BLOOD BY AUTOMATED COUNT: 12 %
FASTING PATIENT LIPID ANSWER: YES
FASTING STATUS PATIENT QL REPORTED: YES
GFR SERPLBLD BASED ON 1.73 SQ M-ARVRAT: 103 ML/MIN/1.73M2 (ref 60–?)
GLOBULIN PLAS-MCNC: 3.9 G/DL (ref 2.8–4.4)
GLUCOSE BLD-MCNC: 98 MG/DL (ref 70–99)
HCT VFR BLD AUTO: 45.7 %
HDLC SERPL-MCNC: 61 MG/DL (ref 40–59)
HGB BLD-MCNC: 15.2 G/DL
IMM GRANULOCYTES # BLD AUTO: 0.01 X10(3) UL (ref 0–1)
IMM GRANULOCYTES NFR BLD: 0.1 %
LDLC SERPL CALC-MCNC: 105 MG/DL (ref ?–100)
LYMPHOCYTES # BLD AUTO: 2.38 X10(3) UL (ref 1–4)
LYMPHOCYTES NFR BLD AUTO: 35.3 %
MCH RBC QN AUTO: 30.3 PG (ref 26–34)
MCHC RBC AUTO-ENTMCNC: 33.3 G/DL (ref 31–37)
MCV RBC AUTO: 91 FL
MONOCYTES # BLD AUTO: 0.54 X10(3) UL (ref 0.1–1)
MONOCYTES NFR BLD AUTO: 8 %
NEUTROPHILS # BLD AUTO: 3.73 X10 (3) UL (ref 1.5–7.7)
NEUTROPHILS # BLD AUTO: 3.73 X10(3) UL (ref 1.5–7.7)
NEUTROPHILS NFR BLD AUTO: 55.4 %
NONHDLC SERPL-MCNC: 123 MG/DL (ref ?–130)
OSMOLALITY SERPL CALC.SUM OF ELEC: 284 MOSM/KG (ref 275–295)
PLATELET # BLD AUTO: 208 10(3)UL (ref 150–450)
POTASSIUM SERPL-SCNC: 3.8 MMOL/L (ref 3.5–5.1)
PROT SERPL-MCNC: 8 G/DL (ref 6.4–8.2)
RBC # BLD AUTO: 5.02 X10(6)UL
SODIUM SERPL-SCNC: 137 MMOL/L (ref 136–145)
TRIGL SERPL-MCNC: 101 MG/DL (ref 30–149)
TSI SER-ACNC: 1.29 MIU/ML (ref 0.36–3.74)
VIT D+METAB SERPL-MCNC: 42.7 NG/ML (ref 30–100)
VLDLC SERPL CALC-MCNC: 17 MG/DL (ref 0–30)
WBC # BLD AUTO: 6.7 X10(3) UL (ref 4–11)

## 2023-05-12 PROCEDURE — 84443 ASSAY THYROID STIM HORMONE: CPT

## 2023-05-12 PROCEDURE — 82306 VITAMIN D 25 HYDROXY: CPT

## 2023-05-12 PROCEDURE — 80053 COMPREHEN METABOLIC PANEL: CPT

## 2023-05-12 PROCEDURE — 85025 COMPLETE CBC W/AUTO DIFF WBC: CPT

## 2023-05-12 PROCEDURE — 80061 LIPID PANEL: CPT

## 2023-05-19 ENCOUNTER — OFFICE VISIT (OUTPATIENT)
Dept: FAMILY MEDICINE CLINIC | Facility: CLINIC | Age: 58
End: 2023-05-19
Payer: COMMERCIAL

## 2023-05-19 VITALS
HEART RATE: 72 BPM | OXYGEN SATURATION: 98 % | DIASTOLIC BLOOD PRESSURE: 78 MMHG | HEIGHT: 72 IN | WEIGHT: 288 LBS | BODY MASS INDEX: 39.01 KG/M2 | RESPIRATION RATE: 16 BRPM | SYSTOLIC BLOOD PRESSURE: 138 MMHG

## 2023-05-19 DIAGNOSIS — R73.03 PREDIABETES: ICD-10-CM

## 2023-05-19 DIAGNOSIS — N20.1 URETEROLITHIASIS: ICD-10-CM

## 2023-05-19 DIAGNOSIS — E78.2 MODERATE MIXED HYPERLIPIDEMIA NOT REQUIRING STATIN THERAPY: ICD-10-CM

## 2023-05-19 DIAGNOSIS — Z00.00 ROUTINE HEALTH MAINTENANCE: Primary | ICD-10-CM

## 2023-05-19 DIAGNOSIS — Z23 NEED FOR VACCINATION: ICD-10-CM

## 2023-05-19 DIAGNOSIS — I10 BENIGN ESSENTIAL HTN: ICD-10-CM

## 2023-05-19 PROBLEM — I77.810 ASCENDING AORTA DILATATION: Status: ACTIVE | Noted: 2023-05-19

## 2023-05-19 PROBLEM — I77.810 ASCENDING AORTA DILATATION (HCC): Status: ACTIVE | Noted: 2023-05-19

## 2023-05-19 PROCEDURE — 90471 IMMUNIZATION ADMIN: CPT | Performed by: FAMILY MEDICINE

## 2023-05-19 PROCEDURE — 90750 HZV VACC RECOMBINANT IM: CPT | Performed by: FAMILY MEDICINE

## 2023-05-19 PROCEDURE — 3008F BODY MASS INDEX DOCD: CPT | Performed by: FAMILY MEDICINE

## 2023-05-19 PROCEDURE — 99214 OFFICE O/P EST MOD 30 MIN: CPT | Performed by: FAMILY MEDICINE

## 2023-05-19 PROCEDURE — 99396 PREV VISIT EST AGE 40-64: CPT | Performed by: FAMILY MEDICINE

## 2023-05-19 PROCEDURE — 3075F SYST BP GE 130 - 139MM HG: CPT | Performed by: FAMILY MEDICINE

## 2023-05-19 PROCEDURE — 3078F DIAST BP <80 MM HG: CPT | Performed by: FAMILY MEDICINE

## 2023-05-19 RX ORDER — TAMSULOSIN HYDROCHLORIDE 0.4 MG/1
0.4 CAPSULE ORAL DAILY
Qty: 30 CAPSULE | Refills: 0 | Status: SHIPPED | OUTPATIENT
Start: 2023-05-19 | End: 2023-06-18

## 2023-05-22 ENCOUNTER — APPOINTMENT (OUTPATIENT)
Dept: CT IMAGING | Facility: HOSPITAL | Age: 58
End: 2023-05-22
Attending: EMERGENCY MEDICINE
Payer: COMMERCIAL

## 2023-05-22 ENCOUNTER — APPOINTMENT (OUTPATIENT)
Dept: ULTRASOUND IMAGING | Facility: HOSPITAL | Age: 58
End: 2023-05-22
Attending: EMERGENCY MEDICINE
Payer: COMMERCIAL

## 2023-05-22 ENCOUNTER — HOSPITAL ENCOUNTER (EMERGENCY)
Facility: HOSPITAL | Age: 58
Discharge: HOME | End: 2023-05-22
Attending: EMERGENCY MEDICINE
Payer: COMMERCIAL

## 2023-05-22 VITALS
WEIGHT: 280 LBS | SYSTOLIC BLOOD PRESSURE: 131 MMHG | HEIGHT: 60 IN | BODY MASS INDEX: 54.97 KG/M2 | DIASTOLIC BLOOD PRESSURE: 90 MMHG | OXYGEN SATURATION: 97 % | RESPIRATION RATE: 16 BRPM | HEART RATE: 64 BPM

## 2023-05-22 DIAGNOSIS — R10.9 RIGHT FLANK PAIN: Primary | ICD-10-CM

## 2023-05-22 LAB
ALBUMIN SERPL-MCNC: 3.5 G/DL (ref 3.4–5)
ALBUMIN/GLOB SERPL: 0.9 {RATIO} (ref 1–2)
ALP LIVER SERPL-CCNC: 60 U/L
ALT SERPL-CCNC: 33 U/L
ANION GAP SERPL CALC-SCNC: 4 MMOL/L (ref 0–18)
AST SERPL-CCNC: 20 U/L (ref 15–37)
BASOPHILS # BLD AUTO: 0.03 X10(3) UL (ref 0–0.2)
BASOPHILS NFR BLD AUTO: 0.7 %
BILIRUB SERPL-MCNC: 0.4 MG/DL (ref 0.1–2)
BILIRUB UR QL STRIP.AUTO: NEGATIVE
BUN BLD-MCNC: 13 MG/DL (ref 7–18)
CALCIUM BLD-MCNC: 8.9 MG/DL (ref 8.5–10.1)
CHLORIDE SERPL-SCNC: 110 MMOL/L (ref 98–112)
CO2 SERPL-SCNC: 25 MMOL/L (ref 21–32)
COLOR UR AUTO: YELLOW
CREAT BLD-MCNC: 0.81 MG/DL
EOSINOPHIL # BLD AUTO: 0.14 X10(3) UL (ref 0–0.7)
EOSINOPHIL NFR BLD AUTO: 3.3 %
ERYTHROCYTE [DISTWIDTH] IN BLOOD BY AUTOMATED COUNT: 11.9 %
GFR SERPLBLD BASED ON 1.73 SQ M-ARVRAT: 103 ML/MIN/1.73M2 (ref 60–?)
GLOBULIN PLAS-MCNC: 3.9 G/DL (ref 2.8–4.4)
GLUCOSE BLD-MCNC: 119 MG/DL (ref 70–99)
GLUCOSE UR STRIP.AUTO-MCNC: NEGATIVE MG/DL
HCT VFR BLD AUTO: 43.9 %
HGB BLD-MCNC: 14.8 G/DL
IMM GRANULOCYTES # BLD AUTO: 0.01 X10(3) UL (ref 0–1)
IMM GRANULOCYTES NFR BLD: 0.2 %
KETONES UR STRIP.AUTO-MCNC: NEGATIVE MG/DL
LEUKOCYTE ESTERASE UR QL STRIP.AUTO: NEGATIVE
LIPASE SERPL-CCNC: 38 U/L (ref 13–75)
LYMPHOCYTES # BLD AUTO: 1.14 X10(3) UL (ref 1–4)
LYMPHOCYTES NFR BLD AUTO: 27.1 %
MCH RBC QN AUTO: 30.6 PG (ref 26–34)
MCHC RBC AUTO-ENTMCNC: 33.7 G/DL (ref 31–37)
MCV RBC AUTO: 90.9 FL
MONOCYTES # BLD AUTO: 0.59 X10(3) UL (ref 0.1–1)
MONOCYTES NFR BLD AUTO: 14 %
NEUTROPHILS # BLD AUTO: 2.3 X10 (3) UL (ref 1.5–7.7)
NEUTROPHILS # BLD AUTO: 2.3 X10(3) UL (ref 1.5–7.7)
NEUTROPHILS NFR BLD AUTO: 54.7 %
NITRITE UR QL STRIP.AUTO: NEGATIVE
OSMOLALITY SERPL CALC.SUM OF ELEC: 289 MOSM/KG (ref 275–295)
PH UR STRIP.AUTO: 5 [PH] (ref 5–8)
PLATELET # BLD AUTO: 167 10(3)UL (ref 150–450)
POTASSIUM SERPL-SCNC: 3.8 MMOL/L (ref 3.5–5.1)
PROT SERPL-MCNC: 7.4 G/DL (ref 6.4–8.2)
PROT UR STRIP.AUTO-MCNC: NEGATIVE MG/DL
RBC # BLD AUTO: 4.83 X10(6)UL
RBC UR QL AUTO: NEGATIVE
SODIUM SERPL-SCNC: 139 MMOL/L (ref 136–145)
SP GR UR STRIP.AUTO: 1.02 (ref 1–1.03)
UROBILINOGEN UR STRIP.AUTO-MCNC: <2 MG/DL
WBC # BLD AUTO: 4.2 X10(3) UL (ref 4–11)

## 2023-05-22 PROCEDURE — 76700 US EXAM ABDOM COMPLETE: CPT | Performed by: EMERGENCY MEDICINE

## 2023-05-22 PROCEDURE — 96375 TX/PRO/DX INJ NEW DRUG ADDON: CPT

## 2023-05-22 PROCEDURE — 99285 EMERGENCY DEPT VISIT HI MDM: CPT

## 2023-05-22 PROCEDURE — 96374 THER/PROPH/DIAG INJ IV PUSH: CPT

## 2023-05-22 PROCEDURE — 99284 EMERGENCY DEPT VISIT MOD MDM: CPT

## 2023-05-22 PROCEDURE — 80053 COMPREHEN METABOLIC PANEL: CPT | Performed by: EMERGENCY MEDICINE

## 2023-05-22 PROCEDURE — 74177 CT ABD & PELVIS W/CONTRAST: CPT | Performed by: EMERGENCY MEDICINE

## 2023-05-22 PROCEDURE — 85025 COMPLETE CBC W/AUTO DIFF WBC: CPT | Performed by: EMERGENCY MEDICINE

## 2023-05-22 PROCEDURE — 81001 URINALYSIS AUTO W/SCOPE: CPT | Performed by: EMERGENCY MEDICINE

## 2023-05-22 PROCEDURE — 83690 ASSAY OF LIPASE: CPT | Performed by: EMERGENCY MEDICINE

## 2023-05-22 RX ORDER — PANTOPRAZOLE SODIUM 20 MG/1
20 TABLET, DELAYED RELEASE ORAL
Qty: 28 TABLET | Refills: 0 | Status: SHIPPED | OUTPATIENT
Start: 2023-05-22 | End: 2023-06-05

## 2023-05-22 RX ORDER — KETOROLAC TROMETHAMINE 15 MG/ML
15 INJECTION, SOLUTION INTRAMUSCULAR; INTRAVENOUS ONCE
Status: COMPLETED | OUTPATIENT
Start: 2023-05-22 | End: 2023-05-22

## 2023-05-22 RX ORDER — ONDANSETRON 4 MG/1
4 TABLET, ORALLY DISINTEGRATING ORAL EVERY 4 HOURS PRN
Qty: 10 TABLET | Refills: 0 | Status: SHIPPED | OUTPATIENT
Start: 2023-05-22 | End: 2023-05-29

## 2023-06-11 DIAGNOSIS — N20.1 URETEROLITHIASIS: ICD-10-CM

## 2023-06-12 ENCOUNTER — TELEMEDICINE (OUTPATIENT)
Dept: FAMILY MEDICINE CLINIC | Facility: CLINIC | Age: 58
End: 2023-06-12

## 2023-06-12 DIAGNOSIS — J01.90 ACUTE NON-RECURRENT SINUSITIS, UNSPECIFIED LOCATION: Primary | ICD-10-CM

## 2023-06-12 DIAGNOSIS — R05.1 ACUTE COUGH: ICD-10-CM

## 2023-06-12 PROCEDURE — 99213 OFFICE O/P EST LOW 20 MIN: CPT | Performed by: FAMILY MEDICINE

## 2023-06-12 RX ORDER — PREDNISONE 20 MG/1
40 TABLET ORAL DAILY
Qty: 10 TABLET | Refills: 0 | Status: SHIPPED | OUTPATIENT
Start: 2023-06-12 | End: 2023-06-17

## 2023-06-12 RX ORDER — ALBUTEROL SULFATE 90 UG/1
2 AEROSOL, METERED RESPIRATORY (INHALATION) EVERY 6 HOURS PRN
Qty: 1 EACH | Refills: 0 | Status: SHIPPED | OUTPATIENT
Start: 2023-06-12

## 2023-06-12 RX ORDER — ACETAMINOPHEN 500 MG
500 TABLET ORAL
COMMUNITY
Start: 2023-06-06 | End: 2023-06-16

## 2023-06-12 RX ORDER — AMOXICILLIN AND CLAVULANATE POTASSIUM 875; 125 MG/1; MG/1
1 TABLET, FILM COATED ORAL 2 TIMES DAILY
Qty: 14 TABLET | Refills: 0 | Status: SHIPPED | OUTPATIENT
Start: 2023-06-12 | End: 2023-06-19

## 2023-06-13 RX ORDER — TAMSULOSIN HYDROCHLORIDE 0.4 MG/1
CAPSULE ORAL
Qty: 30 CAPSULE | Refills: 0 | Status: SHIPPED | OUTPATIENT
Start: 2023-06-13

## 2023-06-22 ENCOUNTER — OFFICE VISIT (OUTPATIENT)
Dept: FAMILY MEDICINE CLINIC | Facility: CLINIC | Age: 58
End: 2023-06-22
Payer: COMMERCIAL

## 2023-06-22 VITALS
SYSTOLIC BLOOD PRESSURE: 132 MMHG | OXYGEN SATURATION: 98 % | WEIGHT: 286 LBS | HEART RATE: 76 BPM | TEMPERATURE: 98 F | RESPIRATION RATE: 16 BRPM | HEIGHT: 72 IN | DIASTOLIC BLOOD PRESSURE: 80 MMHG | BODY MASS INDEX: 38.74 KG/M2

## 2023-06-22 DIAGNOSIS — N20.0 KIDNEY STONES: ICD-10-CM

## 2023-06-22 DIAGNOSIS — R05.2 SUBACUTE COUGH: Primary | ICD-10-CM

## 2023-06-22 PROBLEM — J44.9 CHRONIC OBSTRUCTIVE PULMONARY DISEASE, UNSPECIFIED (HCC): Status: ACTIVE | Noted: 2023-06-22

## 2023-06-22 PROCEDURE — 3008F BODY MASS INDEX DOCD: CPT | Performed by: FAMILY MEDICINE

## 2023-06-22 PROCEDURE — 99213 OFFICE O/P EST LOW 20 MIN: CPT | Performed by: FAMILY MEDICINE

## 2023-06-22 PROCEDURE — 3075F SYST BP GE 130 - 139MM HG: CPT | Performed by: FAMILY MEDICINE

## 2023-06-22 PROCEDURE — 3079F DIAST BP 80-89 MM HG: CPT | Performed by: FAMILY MEDICINE

## 2023-06-22 RX ORDER — LEVOFLOXACIN 500 MG/1
500 TABLET, FILM COATED ORAL DAILY
Qty: 5 TABLET | Refills: 0 | Status: SHIPPED | OUTPATIENT
Start: 2023-06-22 | End: 2023-07-02

## 2023-06-22 RX ORDER — FLUTICASONE PROPIONATE AND SALMETEROL 250; 50 UG/1; UG/1
1 POWDER RESPIRATORY (INHALATION) EVERY 12 HOURS SCHEDULED
Qty: 60 EACH | Refills: 0 | Status: SHIPPED | OUTPATIENT
Start: 2023-06-22

## 2023-06-22 RX ORDER — CODEINE PHOSPHATE AND GUAIFENESIN 10; 100 MG/5ML; MG/5ML
SOLUTION ORAL EVERY 6 HOURS PRN
Qty: 100 ML | Refills: 0 | Status: SHIPPED | OUTPATIENT
Start: 2023-06-22

## 2023-06-26 ENCOUNTER — HOSPITAL ENCOUNTER (OUTPATIENT)
Dept: GENERAL RADIOLOGY | Age: 58
Discharge: HOME | End: 2023-06-26
Attending: FAMILY MEDICINE
Payer: COMMERCIAL

## 2023-06-26 DIAGNOSIS — R05.2 SUBACUTE COUGH: ICD-10-CM

## 2023-06-26 PROCEDURE — 71046 X-RAY EXAM CHEST 2 VIEWS: CPT | Performed by: FAMILY MEDICINE

## 2023-06-27 DIAGNOSIS — R05.2 SUBACUTE COUGH: ICD-10-CM

## 2023-06-27 RX ORDER — LEVOFLOXACIN 500 MG/1
500 TABLET, FILM COATED ORAL DAILY
Qty: 5 TABLET | Refills: 0 | Status: SHIPPED | OUTPATIENT
Start: 2023-06-27 | End: 2023-07-07

## 2023-07-14 DIAGNOSIS — N20.1 URETEROLITHIASIS: ICD-10-CM

## 2023-07-18 ENCOUNTER — OFFICE VISIT (OUTPATIENT)
Dept: SURGERY | Facility: CLINIC | Age: 58
End: 2023-07-18

## 2023-07-18 DIAGNOSIS — N13.8 BPH WITH OBSTRUCTION/LOWER URINARY TRACT SYMPTOMS: ICD-10-CM

## 2023-07-18 DIAGNOSIS — R82.90 URINE FINDING: Primary | ICD-10-CM

## 2023-07-18 DIAGNOSIS — N20.0 RECURRENT KIDNEY STONES: ICD-10-CM

## 2023-07-18 DIAGNOSIS — N52.9 ERECTILE DYSFUNCTION, UNSPECIFIED ERECTILE DYSFUNCTION TYPE: ICD-10-CM

## 2023-07-18 DIAGNOSIS — N40.1 BPH WITH OBSTRUCTION/LOWER URINARY TRACT SYMPTOMS: ICD-10-CM

## 2023-07-18 LAB
APPEARANCE: CLEAR
BILIRUBIN: NEGATIVE
GLUCOSE (URINE DIPSTICK): NEGATIVE MG/DL
KETONES (URINE DIPSTICK): NEGATIVE MG/DL
LEUKOCYTES: NEGATIVE
MULTISTIX LOT#: ABNORMAL NUMERIC
NITRITE, URINE: NEGATIVE
OCCULT BLOOD: NEGATIVE
PH, URINE: 6 (ref 4.5–8)
PROTEIN (URINE DIPSTICK): NEGATIVE MG/DL
SPECIFIC GRAVITY: <=1.005 (ref 1–1.03)
URINE-COLOR: YELLOW
UROBILINOGEN,SEMI-QN: 0.2 MG/DL (ref 0–1.9)

## 2023-07-18 PROCEDURE — 99214 OFFICE O/P EST MOD 30 MIN: CPT | Performed by: UROLOGY

## 2023-07-18 PROCEDURE — 81002 URINALYSIS NONAUTO W/O SCOPE: CPT | Performed by: UROLOGY

## 2023-07-18 RX ORDER — SILDENAFIL 100 MG/1
100 TABLET, FILM COATED ORAL
Qty: 30 TABLET | Refills: 5 | Status: SHIPPED | OUTPATIENT
Start: 2023-07-18

## 2023-07-18 RX ORDER — TAMSULOSIN HYDROCHLORIDE 0.4 MG/1
0.4 CAPSULE ORAL DAILY
Qty: 90 CAPSULE | Refills: 0 | Status: SHIPPED | OUTPATIENT
Start: 2023-07-18

## 2023-07-18 NOTE — H&P
HPI:     Rashi Peralta is a 66year old male with a PMH of depression, HTN, hemorrhoids. He presents as a consult with:  1. Right flank pain  2. Recurrent kidney stones  - passed 3, one ESWL  3. recurrent left epididymitis  4. BPH/LUTS  - flomax 2018  5. ED     PCP - Savanah Martinez Urologist - Krysten Carcamo 7/26/22    Presents with right flank pain and possible kidney stone. Went to ER in May with US showing stone, CT report indicates no stone but actually has ~ 4 mm RLP stone on CT. Still having right flank discomfort, worse when laying on right side. AUA SS is 5/35. Happy with LUTS. Abd exam: point tenderness to right flank    UA is negative     UTI hx: none  Gross hematuria: with kidney stones  Tobacco hx: none  Fam h/o  malignancy: none    50% potency. Has not tried anything in the past.  Discussed both viagra and cialis as options and reviewed the risks and benefits and possible side effects. He would like to try viagra with coupon to [place]. PSA 2.34 5/12/23    Drinks ~ 32 oz water, 20 oz coffee with medium yellow urine. CT SP 5/22/23: 3-4 mm RLP stone  Abd US 5/22/23: 5 right renal stone    We discussed that the pain is likely musculoskeletal at this time. I encouraged hot packs, light stretching, massage, NSAID use if the patient is able to take with plenty of fluids. If above strategies fail to alleviate pain I recommend f/u with PCP to discuss further. We discussed stone prevention strategies at today's visit and I provided and reviewed educational materials for this. I recommend drinking at least 40-60 ounces of water per day or enough water to keep urine clear. I also recommend the patient avoid a high sodium diet. I also recommend avoiding foods high in oxalate and provided a list of foods high in oxalate. Finally we discussed obtaining a 24 h urine for stone prevention and the patient would like to do this.     He will significantly increase water intake and see if able to decrease Vit D. Checking 24 h urine. MSK strategies reviewed for flank pain. Declines repeat CT for right now but open to checking KUB in 6 mo and hopes for ESWL over URS in the future if procedure is required. F/u in 6 mo with KUB prior.     HISTORY:  Past Medical History:   Diagnosis Date    Abdominal pain     Occasional    Acute bilateral low back pain with bilateral sciatica 03/09/2018    Axillary mass, bilateral 04/16/2019    Back pain     Back problem     Bloating     Occasional    Calculus of kidney     Constipation     Occasional    Depression     Diarrhea, unspecified     Occasional    Dizziness     In conjunction with PVCs    Enlarged lymph node     Currently under care    Epididymitis     Essential hypertension     Fatigue     Daily    Flatulence/gas pain/belching     Occasional    Headache disorder     Couple times a week    Heart palpitations     Idiopathic diagnosis    Heartburn     Occasional    Hemorrhoids     High blood pressure     Indigestion     Occasioal    Irregular bowel habits     Kidney stones     Loss of appetite     Occasional    Mass of left thigh 04/16/2019    Migraines     Nausea     Occasional    Pain in joints 2021    Pain with bowel movements     Occasional    Pneumonia due to organism     Sleep apnea     Possible- sleep study scheduled 4/19/19    Sleep disturbance     Daily    Stress     Visual impairment     Wears glasses     Since late teens      Past Surgical History:   Procedure Laterality Date    COLONOSCOPY      COLONOSCOPY      SINUS SURGERY          Family History   Problem Relation Age of Onset    Cancer Father 68        melanoma    Depression Father     Cancer Mother 58        lymphoma    Heart Disorder Mother     Depression Mother     Asthma Mother     Asthma Sister     Kidney Disease Maternal Grandmother     Other (Parkensons) Paternal Grandfather       Social History:   Social History     Socioeconomic History    Marital status:    Tobacco Use    Smoking status: Never    Smokeless tobacco: Never   Vaping Use    Vaping Use: Never used   Substance and Sexual Activity    Alcohol use: Yes     Comment: Rarely    Drug use: No   Other Topics Concern    Caffeine Concern Yes     Comment: 2 cups decaf daily    Exercise Yes     Comment: walks- daily    Seat Belt Yes        Medications (Active prior to today's visit):  Current Outpatient Medications   Medication Sig Dispense Refill    Sildenafil Citrate 100 MG Oral Tab Take 1 tablet (100 mg total) by mouth daily as needed for Erectile Dysfunction. Take ~ one hour prior to sexual activity on an empty stomach 30 tablet 5    tamsulosin 0.4 MG Oral Cap Take 1 capsule (0.4 mg total) by mouth daily. 90 capsule 0    ALBUTEROL 108 (90 Base) MCG/ACT Inhalation Aero Soln INHALE 2 PUFFS INTO THE LUNGS EVERY 6 HOURS AS NEEDED FOR WHEEZING (COUGH). (Patient not taking: Reported on 7/18/2023) 8.5 each 0    fluticasone-salmeterol 250-50 MCG/ACT Inhalation Aerosol Powder, Breath Activated Inhale 1 puff into the lungs every 12 (twelve) hours. (Patient not taking: Reported on 7/18/2023) 60 each 0    guaiFENesin-codeine (CHERATUSSIN AC) 100-10 MG/5ML Oral Solution Take 5-10 mL by mouth every 6 (six) hours as needed for cough. 100 mL 0    dilTIAZem HCl ER Coated Beads 180 MG Oral Capsule SR 24 Hr Take 1 capsule (180 mg total) by mouth daily. 90 capsule 1    metoprolol succinate 25 MG Oral Tablet 24 Hr TAKE 1 TABLET BY MOUTH EVERY MORNING AND 1/2 TABLET BY MOUTH AT NIGHT 135 tablet 2    Omega-3 Fatty Acids (FISH OIL CONCENTRATE OR) Take 1 capsule by mouth every evening. Lactobacillus (PROBIOTIC ACIDOPHILUS OR) Take 1 tablet by mouth daily with lunch. Multiple Vitamins-Minerals (MULTI-VITAMIN/MINERALS) Oral Tab Take 1 tablet by mouth daily with lunch.      magnesium oxide 400 MG Oral Tab Take 1 tablet (400 mg total) by mouth every evening.  30 tablet 0       Allergies:    Bactrim Ds              HIVES  Sulfamethoxazole        UNKNOWN Comment:[facility] Allergy Text Annotation: sulfamethoxazole  Gabapentin              RASH, OTHER (SEE COMMENTS)    Comment:Tingle      ROS:     A comprehensive 10 point review of systems was completed. Pertinent positives and negatives noted in the the HPI. PHYSICAL EXAM:     GENERAL APPEARANCE: well, developed, well nourished, in no acute distress  NEUROLOGIC: nonfocal, alert and oriented  HEAD: normocephalic, atraumatic  EYES: sclera non-icteric  EARS: hearing intact  ORAL CAVITY: mucosa moist  NECK/THYROID: no obvious goiter or masses  LUNGS: nonlabored breathing  ABDOMEN: soft, no obvious masses or tenderness  SKIN: no obvious rashes    : as noted above    ASSESSMENT/PLAN:   Diagnoses and all orders for this visit:    Urine finding  -     URINALYSIS NONAUTO W/O SCOPE    Recurrent kidney stones  -     XR ABDOMEN (1 VIEW) (CPT=74018); Future  -     KIDNEY STONE URINE TEST COMBINATION WITH SATURATION CALCULATIONS; Future    BPH with obstruction/lower urinary tract symptoms    Erectile dysfunction, unspecified erectile dysfunction type  -     Sildenafil Citrate 100 MG Oral Tab; Take 1 tablet (100 mg total) by mouth daily as needed for Erectile Dysfunction. Take ~ one hour prior to sexual activity on an empty stomach      - as noted above. Thanks again for this consult.     Timmy Ibrahim MD, [address]  Urologist  [address]  Office: [phone]

## 2023-08-01 ENCOUNTER — TELEPHONE (OUTPATIENT)
Dept: SURGERY | Facility: CLINIC | Age: 58
End: 2023-08-01

## 2023-09-05 ENCOUNTER — TELEPHONE (OUTPATIENT)
Dept: FAMILY MEDICINE CLINIC | Facility: CLINIC | Age: 58
End: 2023-09-05

## 2023-09-05 DIAGNOSIS — L60.0 INGROWN TOENAIL: Primary | ICD-10-CM

## 2023-09-23 DIAGNOSIS — I10 BENIGN ESSENTIAL HTN: ICD-10-CM

## 2023-09-23 DIAGNOSIS — I49.3 PVCS (PREMATURE VENTRICULAR CONTRACTIONS): ICD-10-CM

## 2023-09-25 RX ORDER — DILTIAZEM HYDROCHLORIDE 180 MG/1
180 CAPSULE, COATED, EXTENDED RELEASE ORAL DAILY
Qty: 90 CAPSULE | Refills: 1 | Status: SHIPPED | OUTPATIENT
Start: 2023-09-25

## 2023-09-25 NOTE — TELEPHONE ENCOUNTER
Requested Prescriptions     Signed Prescriptions Disp Refills    DILTIAZEM HCL ER COATED BEADS 180 MG Oral Capsule SR 24 Hr 90 capsule 1     Sig: TAKE 1 CAPSULE BY MOUTH EVERY DAY     Authorizing Provider: Siva Hannah     Ordering User: Charity Matute, [address] [place] [address] 6/22/2023     Patient was asked to follow-up in: 6 months    Appointment scheduled: 11/17/2023 Chelsey Campoverde, DO     Medication refilled per protocol.

## 2023-10-03 ENCOUNTER — HOSPITAL ENCOUNTER (OUTPATIENT)
Age: 58
Discharge: HOME | End: 2023-10-03
Payer: COMMERCIAL

## 2023-10-03 ENCOUNTER — APPOINTMENT (OUTPATIENT)
Dept: CT IMAGING | Age: 58
End: 2023-10-03
Attending: PHYSICIAN ASSISTANT
Payer: COMMERCIAL

## 2023-10-03 VITALS
HEART RATE: 68 BPM | DIASTOLIC BLOOD PRESSURE: 86 MMHG | OXYGEN SATURATION: 96 % | TEMPERATURE: 98 F | RESPIRATION RATE: 16 BRPM | WEIGHT: 280 LBS | HEIGHT: 72 IN | BODY MASS INDEX: 37.93 KG/M2 | SYSTOLIC BLOOD PRESSURE: 129 MMHG

## 2023-10-03 DIAGNOSIS — I88.0 MESENTERIC LYMPHADENITIS: ICD-10-CM

## 2023-10-03 DIAGNOSIS — R10.31 RLQ ABDOMINAL PAIN: Primary | ICD-10-CM

## 2023-10-03 LAB
#MXD IC: 0.6 X10ˆ3/UL (ref 0.1–1)
BILIRUB UR QL STRIP: NEGATIVE
BUN BLD-MCNC: 16 MG/DL (ref 7–18)
CHLORIDE BLD-SCNC: 105 MMOL/L (ref 98–112)
CLARITY UR: CLEAR
CO2 BLD-SCNC: 24 MMOL/L (ref 21–32)
COLOR UR: YELLOW
CREAT BLD-MCNC: 0.7 MG/DL
EGFRCR SERPLBLD CKD-EPI 2021: 108 ML/MIN/1.73M2 (ref 60–?)
GLUCOSE BLD-MCNC: 97 MG/DL (ref 70–99)
GLUCOSE UR STRIP-MCNC: NEGATIVE MG/DL
HCT VFR BLD AUTO: 48.1 %
HCT VFR BLD CALC: 49 %
HGB BLD-MCNC: 15.7 G/DL
HGB UR QL STRIP: NEGATIVE
ISTAT IONIZED CALCIUM FOR CHEM 8: 1.16 MMOL/L (ref 1.12–1.32)
KETONES UR STRIP-MCNC: NEGATIVE MG/DL
LEUKOCYTE ESTERASE UR QL STRIP: NEGATIVE
LYMPHOCYTES # BLD AUTO: 1.9 X10ˆ3/UL (ref 1–4)
LYMPHOCYTES NFR BLD AUTO: 27.4 %
MCH RBC QN AUTO: 30.4 PG (ref 26–34)
MCHC RBC AUTO-ENTMCNC: 32.6 G/DL (ref 31–37)
MCV RBC AUTO: 93.2 FL (ref 80–100)
MIXED CELL %: 8.5 %
NEUTROPHILS # BLD AUTO: 4.3 X10ˆ3/UL (ref 1.5–7.7)
NEUTROPHILS NFR BLD AUTO: 64.1 %
NITRITE UR QL STRIP: NEGATIVE
PH UR STRIP: 6 [PH]
PLATELET # BLD AUTO: 215 X10ˆ3/UL (ref 150–450)
POTASSIUM BLD-SCNC: 3.8 MMOL/L (ref 3.6–5.1)
PROT UR STRIP-MCNC: NEGATIVE MG/DL
RBC # BLD AUTO: 5.16 X10ˆ6/UL
SODIUM BLD-SCNC: 141 MMOL/L (ref 136–145)
SP GR UR STRIP: 1.01
UROBILINOGEN UR STRIP-ACNC: <2 MG/DL
WBC # BLD AUTO: 6.8 X10ˆ3/UL (ref 4–11)

## 2023-10-03 PROCEDURE — 80047 BASIC METABLC PNL IONIZED CA: CPT | Performed by: PHYSICIAN ASSISTANT

## 2023-10-03 PROCEDURE — 85025 COMPLETE CBC W/AUTO DIFF WBC: CPT | Performed by: PHYSICIAN ASSISTANT

## 2023-10-03 PROCEDURE — 81002 URINALYSIS NONAUTO W/O SCOPE: CPT | Performed by: PHYSICIAN ASSISTANT

## 2023-10-03 PROCEDURE — 74177 CT ABD & PELVIS W/CONTRAST: CPT | Performed by: PHYSICIAN ASSISTANT

## 2023-10-03 PROCEDURE — 99214 OFFICE O/P EST MOD 30 MIN: CPT | Performed by: PHYSICIAN ASSISTANT

## 2023-10-03 RX ORDER — SODIUM CHLORIDE 9 MG/ML
INJECTION, SOLUTION INTRAVENOUS ONCE
Status: COMPLETED | OUTPATIENT
Start: 2023-10-03 | End: 2023-10-03

## 2023-10-03 NOTE — ED INITIAL ASSESSMENT (HPI)
Sun Pt c/o intermittent dull celeste lower abd radiates to back \"I have chronic back pain. \" N/V/D      Denies: issues with urination, blood in stool.

## 2023-10-06 ENCOUNTER — OFFICE VISIT (OUTPATIENT)
Dept: PODIATRY CLINIC | Facility: CLINIC | Age: 58
End: 2023-10-06

## 2023-10-06 DIAGNOSIS — L60.3 NAIL DYSTROPHY: ICD-10-CM

## 2023-10-06 DIAGNOSIS — L60.0 INGROWN NAIL OF GREAT TOE OF RIGHT FOOT: Primary | ICD-10-CM

## 2023-10-06 DIAGNOSIS — M79.674 TOE PAIN, RIGHT: ICD-10-CM

## 2023-10-06 PROCEDURE — 99203 OFFICE O/P NEW LOW 30 MIN: CPT | Performed by: STUDENT IN AN ORGANIZED HEALTH CARE EDUCATION/TRAINING PROGRAM

## 2023-10-08 NOTE — PROGRESS NOTES
[facility], [facility] Podiatry  Progress Note    Nacho Suárez is a 66year old male. Patient presents with:  Ingrown Toenail: Consult right foot great toe Ingrown toenail- pain 1/10 he cut a small piece to relieved pain. And toenail Fungus infection        HPI:     Patient is a pleasant 61-year-old male presents to clinic for evaluation of painful ingrown nail to medial border of his right great toe. He has pain he rates it a 1 out of 10. He tried to trim site and his own had some mild relief in symptoms. He denies any acute signs of infection. He does have some mildly dystrophic changes to his right great toe and wants to make sure no fungus is present as well. No other complaints are mentioned. Past medical history, medications, and allergies reviewed. Allergies: Bactrim Ds, Sulfamethoxazole, and Gabapentin   Current Outpatient Medications   Medication Sig Dispense Refill    DILTIAZEM HCL ER COATED BEADS 180 MG Oral Capsule SR 24 Hr TAKE 1 CAPSULE BY MOUTH EVERY DAY 90 capsule 1    Sildenafil Citrate 100 MG Oral Tab Take 1 tablet (100 mg total) by mouth daily as needed for Erectile Dysfunction. Take ~ one hour prior to sexual activity on an empty stomach 30 tablet 5    metoprolol succinate 25 MG Oral Tablet 24 Hr TAKE 1 TABLET BY MOUTH EVERY MORNING AND 1/2 TABLET BY MOUTH AT NIGHT 135 tablet 2    Omega-3 Fatty Acids (FISH OIL CONCENTRATE OR) Take 1 capsule by mouth every evening. Lactobacillus (PROBIOTIC ACIDOPHILUS OR) Take 1 tablet by mouth daily with lunch. Multiple Vitamins-Minerals (MULTI-VITAMIN/MINERALS) Oral Tab Take 1 tablet by mouth daily with lunch.      magnesium oxide 400 MG Oral Tab Take 1 tablet (400 mg total) by mouth every evening. 30 tablet 0    tamsulosin 0.4 MG Oral Cap Take 1 capsule (0.4 mg total) by mouth daily.  90 capsule 0      Past Medical History:   Diagnosis Date    Abdominal pain     Occasional    Acute bilateral low back pain with bilateral sciatica 03/09/2018 Axillary mass, bilateral 04/16/2019    Back pain     Back problem     Bloating     Occasional    Calculus of kidney     Constipation     Occasional    Depression     Diarrhea, unspecified     Occasional    Dizziness     In conjunction with PVCs    Enlarged lymph node     Currently under care    Epididymitis     Essential hypertension     Fatigue     Daily    Flatulence/gas pain/belching     Occasional    Headache disorder     Couple times a week    Heart palpitations     Idiopathic diagnosis    Heartburn     Occasional    Hemorrhoids     High blood pressure     Indigestion     Occasioal    Irregular bowel habits     Kidney stones     Loss of appetite     Occasional    Mass of left thigh 04/16/2019    Migraines     Nausea     Occasional    Pain in joints 2021    Pain with bowel movements     Occasional    Pneumonia due to organism     Sleep apnea     Possible- sleep study scheduled 4/19/19    Sleep disturbance     Daily    Stress     Visual impairment     Wears glasses     Since late teens      Past Surgical History:   Procedure Laterality Date    COLONOSCOPY      COLONOSCOPY      SINUS SURGERY          Family History   Problem Relation Age of Onset    Cancer Father 68        melanoma    Depression Father     Cancer Mother 58        lymphoma    Heart Disorder Mother     Depression Mother     Asthma Mother     Asthma Sister     Kidney Disease Maternal Grandmother     Other (Parkensons) Paternal Grandfather       Social History    Socioeconomic History      Marital status:     Tobacco Use      Smoking status: Never      Smokeless tobacco: Never    Vaping Use      Vaping Use: Never used    Substance and Sexual Activity      Alcohol use: Yes        Comment: Rarely      Drug use: No    Other Topics      Concerns:        Caffeine Concern: Yes          2 cups decaf daily        Exercise: Yes          walks- daily        Seat Belt: Yes          REVIEW OF SYSTEMS:     Today reviewed systems as documented below  GENERAL HEALTH: feels well otherwise  SKIN: denies any unusual skin lesions or rashes  RESPIRATORY: denies shortness of breath with exertion  CARDIOVASCULAR: denies chest pain on exertion  GI: denies abdominal pain and denies heartburn  NEURO: denies headaches  MUSCULO: history of back pain      EXAM:   There were no vitals taken for this visit. GENERAL: well developed, well nourished, in no apparent distress  EXTREMITIES:   1. Integument: Normal skin temperature and turgor. Medial border of right great toe is incurvated with minor erythema and HPK noted. Very minor dystrophy to distal aspect of right hallux more consistent with trauma than fungus visually. Proximal portion of nail is clear in appearance. 2. Vascular: Dorsalis pedis two out of four bilateral and posterior tibial pulses two out of   four bilateral, capillary refill normal.   3. Musculoskeletal: All muscle groups are graded 5 out of 5 in the foot and ankle. Pain noted to medial border of right great toe. 4. Neurological: Normal sharp dull sensation; reflexes normal.        ASSESSMENT AND PLAN:   Diagnoses and all orders for this visit:    Ingrown nail of great toe of right foot    Toe pain, right    Nail dystrophy        Plan:    -Patient examined, chart history reviewed. -Discussed etiology of condition and various treatment options.  -Discussed treatment options including slant back procedure versus nail matrixectomy procedure to medial border of right great toe. Because patient's pain is localized more distally would like to try more conservative treatment option at this time. Slant back performed with sterile nail nipper without incident. Debris and HPK curetted free with dermal curette without incident. Pinpoint bleeding noted that was dressed with bacitracin and Band-Aid. Patient can perform Epsom salt soaks and monitor for routine healing.   He should follow-up if any acute signs of infection or other concerns arise.  -Recommend we follow-up in approximately 2 months for reevaluation. If ingrown seems to be returning we could consider matrixectomy procedure at this time.  -To me patient's nail does look healthy and does not look like it is fungal.  Discussed that we could perform nail biopsy to check for sure if symptoms worsen or fail to improve. Patient expressed understanding and all questions were answered to satisfaction. The patient indicates understanding of these issues and agrees to the plan.     Stalin Benitez DPM

## 2023-10-20 ENCOUNTER — IMMUNIZATION (OUTPATIENT)
Dept: LAB | Age: 58
End: 2023-10-20
Attending: EMERGENCY MEDICINE
Payer: COMMERCIAL

## 2023-10-20 DIAGNOSIS — Z23 NEED FOR VACCINATION: Primary | ICD-10-CM

## 2023-10-20 PROCEDURE — 90480 ADMN SARSCOV2 VAC 1/ONLY CMP: CPT

## 2023-10-31 ENCOUNTER — OFFICE VISIT (OUTPATIENT)
Dept: FAMILY MEDICINE CLINIC | Facility: CLINIC | Age: 58
End: 2023-10-31

## 2023-10-31 VITALS
SYSTOLIC BLOOD PRESSURE: 134 MMHG | RESPIRATION RATE: 16 BRPM | OXYGEN SATURATION: 95 % | BODY MASS INDEX: 39.68 KG/M2 | HEART RATE: 80 BPM | WEIGHT: 293 LBS | TEMPERATURE: 97 F | HEIGHT: 72 IN | DIASTOLIC BLOOD PRESSURE: 86 MMHG

## 2023-10-31 DIAGNOSIS — G43.801 OCULAR MIGRAINE WITH STATUS MIGRAINOSUS: Primary | ICD-10-CM

## 2023-10-31 PROCEDURE — 99214 OFFICE O/P EST MOD 30 MIN: CPT | Performed by: FAMILY MEDICINE

## 2023-10-31 PROCEDURE — 3079F DIAST BP 80-89 MM HG: CPT | Performed by: FAMILY MEDICINE

## 2023-10-31 PROCEDURE — 3075F SYST BP GE 130 - 139MM HG: CPT | Performed by: FAMILY MEDICINE

## 2023-10-31 PROCEDURE — 3008F BODY MASS INDEX DOCD: CPT | Performed by: FAMILY MEDICINE

## 2023-10-31 RX ORDER — PREDNISONE 10 MG/1
TABLET ORAL
Qty: 39 TABLET | Refills: 0 | Status: SHIPPED | OUTPATIENT
Start: 2023-10-31 | End: 2023-11-16

## 2023-11-02 ENCOUNTER — OFFICE VISIT (OUTPATIENT)
Dept: NEUROLOGY | Facility: CLINIC | Age: 58
End: 2023-11-02
Payer: COMMERCIAL

## 2023-11-02 VITALS
BODY MASS INDEX: 39 KG/M2 | OXYGEN SATURATION: 97 % | WEIGHT: 290 LBS | RESPIRATION RATE: 16 BRPM | SYSTOLIC BLOOD PRESSURE: 132 MMHG | DIASTOLIC BLOOD PRESSURE: 70 MMHG | HEART RATE: 74 BPM

## 2023-11-02 DIAGNOSIS — G43.119 INTRACTABLE MIGRAINE WITH AURA WITHOUT STATUS MIGRAINOSUS: ICD-10-CM

## 2023-11-02 DIAGNOSIS — G43.809 VESTIBULAR MIGRAINE: ICD-10-CM

## 2023-11-02 DIAGNOSIS — G43.909 EPISODIC MIGRAINE: Primary | ICD-10-CM

## 2023-11-02 DIAGNOSIS — F51.04 PSYCHOPHYSIOLOGICAL INSOMNIA: ICD-10-CM

## 2023-11-02 PROCEDURE — 3078F DIAST BP <80 MM HG: CPT | Performed by: OTHER

## 2023-11-02 PROCEDURE — 3075F SYST BP GE 130 - 139MM HG: CPT | Performed by: OTHER

## 2023-11-02 PROCEDURE — 99204 OFFICE O/P NEW MOD 45 MIN: CPT | Performed by: OTHER

## 2023-11-02 RX ORDER — SUMATRIPTAN 25 MG/1
TABLET, FILM COATED ORAL
Qty: 9 TABLET | Refills: 2 | Status: SHIPPED | OUTPATIENT
Start: 2023-11-02

## 2023-11-02 RX ORDER — ONDANSETRON 4 MG/1
4 TABLET, ORALLY DISINTEGRATING ORAL EVERY 8 HOURS PRN
Qty: 30 TABLET | Refills: 0 | Status: SHIPPED | OUTPATIENT
Start: 2023-11-02

## 2023-11-02 RX ORDER — CYCLOBENZAPRINE HCL 10 MG
TABLET ORAL
Qty: 20 TABLET | Refills: 0 | Status: SHIPPED | OUTPATIENT
Start: 2023-11-02

## 2023-11-02 NOTE — PROGRESS NOTES
Patient states migraines come in a cycle. Patient states he can go weeks without a migraine. Patient is currently having a migraines, with dizziness, fatigue, left sided facial numbness and muscle spasm. Patient states left side of the body is usually affected by the migraines. Patient denies facial drooping. Denies changes in speech and memory. Patient states decrease in balance when having a migraines. Patient states migraines started a few years ago. Patient states some dull HA.

## 2023-11-02 NOTE — PROGRESS NOTES
Julia [address]27   Neurology    Peyton Gomez Patient Status:  No patient class for patient encounter    1965 MRN [id number]   Location [facility], [address], [address] [facility] PCP Leslie Navarro DO              HPI:   Peyton Gomez is a(n) 66year old male with history of HTN, viral meningitis as a small child, who presents at the request of Leslie Navarro DO for evaluation of chronic migraines. Headaches are throbbing, usually unilateral, worsened with activity and light, and associated with nausea. Sees flickering light on the left, and has left facial and arm numbness. Migraines come in clusters, occurs daily for several days, and can go weeks without on. Also has dizziness with the migraines. Is having daily ocular auras and migraines currently. Has been on amitriptyline for lumbar radiculopathy, but had an increase in PVC's with it. Stress triggers them. Has 2 weeks of migraines 3-4 times a year. Has tried ibuprofen, excedrin, sensitive to caffeine so can't do fiorcet. Meds tried  Propranolol as a child   Cymbalta- fatigue, nightmares    Pertinent imaging and laboratory work-up:   MRI brain 20  CONCLUSION:       1. No acute intracranial abnormality identified. 2. Minimal chronic microvascular ischemic changes in the cerebral white matter. MRI cervical and thoracic 2019  Impression   CONCLUSION:    MR imaging of the cervical spine and thoracic spine are within normal limits. Normal appearance of the spinal cord. No significant disc disease or evidence of stenosis identified. No significant osseous abnormalities. The paraspinal soft tissues are  also within normal limits.          Past Medical History:  Past Medical History:   Diagnosis Date    Abdominal pain     Occasional    Acute bilateral low back pain with bilateral sciatica 2018    Axillary mass, bilateral 2019    Back pain     Back problem     Bloating Occasional    Calculus of kidney     Constipation     Occasional    Depression     Diarrhea, unspecified     Occasional    Dizziness     In conjunction with PVCs    Enlarged lymph node     Currently under care    Epididymitis     Essential hypertension     Fatigue     Daily    Flatulence/gas pain/belching     Occasional    Headache disorder     Couple times a week    Heart palpitations     Idiopathic diagnosis    Heartburn     Occasional    Hemorrhoids     High blood pressure     Indigestion     Occasioal    Irregular bowel habits     Kidney stones     Loss of appetite     Occasional    Mass of left thigh 04/16/2019    Migraines     Nausea     Occasional    Pain in joints 2021    Pain with bowel movements     Occasional    Pneumonia due to organism     Sleep apnea     Possible- sleep study scheduled 4/19/19    Sleep disturbance     Daily    Stress     Visual impairment     Wears glasses     Since late teens        Past Surgical History:  Past Surgical History:   Procedure Laterality Date    COLONOSCOPY      COLONOSCOPY      SINUS SURGERY           Family History:  family history includes Asthma in his mother and sister; Cancer (age of onset: 58) in his mother; Cancer (age of onset: 68) in his father; Depression in his father and mother; Heart Disorder in his mother; Kidney Disease in his maternal grandmother; Parkensons in his paternal grandfather. Social History:   reports that he has never smoked. He has never used smokeless tobacco. He reports current alcohol use. He reports that he does not use drugs.     Allergies:    Bactrim Ds              HIVES  Sulfamethoxazole        UNKNOWN    Comment:Cerner Allergy Text Annotation: sulfamethoxazole  Gabapentin              RASH, OTHER (SEE COMMENTS)    Comment:Tingle    MEDICATIONS:    Current Outpatient Medications:     ondansetron 4 MG Oral Tablet Dispersible, Take 1 tablet (4 mg total) by mouth every 8 (eight) hours as needed for Nausea., Disp: 30 tablet, Rfl: 0 SUMAtriptan 25 MG Oral Tab, Use at onset; repeat once after 2 hours-ONLY 2 IN 24 HR MAX. This is a one month supply, Disp: 9 tablet, Rfl: 2    cyclobenzaprine 10 MG Oral Tab, Take 1 tablet nightly as needed for muscle spasms, during a migraine cycle, Disp: 20 tablet, Rfl: 0    predniSONE 10 MG Oral Tab, Take 4 tablets (40 mg total) by mouth daily for 5 days, THEN 3 tablets (30 mg total) daily for 3 days, THEN 2 tablets (20 mg total) daily for 3 days, THEN 1 tablet (10 mg total) daily for 3 days, THEN 0.5 tablets (5 mg total) daily for 2 days. , Disp: 39 tablet, Rfl: 0    DILTIAZEM HCL ER COATED BEADS 180 MG Oral Capsule SR 24 Hr, TAKE 1 CAPSULE BY MOUTH EVERY DAY, Disp: 90 capsule, Rfl: 1    Sildenafil Citrate 100 MG Oral Tab, Take 1 tablet (100 mg total) by mouth daily as needed for Erectile Dysfunction. Take ~ one hour prior to sexual activity on an empty stomach, Disp: 30 tablet, Rfl: 5    metoprolol succinate 25 MG Oral Tablet 24 Hr, TAKE 1 TABLET BY MOUTH EVERY MORNING AND 1/2 TABLET BY MOUTH AT NIGHT, Disp: 135 tablet, Rfl: 2    Omega-3 Fatty Acids (FISH OIL CONCENTRATE OR), Take 1 capsule by mouth every evening., Disp: , Rfl:     Lactobacillus (PROBIOTIC ACIDOPHILUS OR), Take 1 tablet by mouth daily with lunch., Disp: , Rfl:     Multiple Vitamins-Minerals (MULTI-VITAMIN/MINERALS) Oral Tab, Take 1 tablet by mouth daily with lunch., Disp: , Rfl:     magnesium oxide 400 MG Oral Tab, Take 1 tablet (400 mg total) by mouth every evening., Disp: 30 tablet, Rfl: 0      Review of Systems:   A comprehensive 10 point review of systems was completed. Pertinent positives and negatives noted in the HPI. PHYSICAL EXAM:   Neurologic Exam  Vitals   11/02/23  0929   BP: 132/70   Pulse: 74   Resp: 16     General Appearance: Patient is a 66year old male in no acute distress  Cardiac: Normal rate & regular rhythm  Skin: There are no rashes or other skin lesions.   Musculoskeletal: There is no scoliosis, or joint deformities  Neurologic examination:  Mental status: Patient is alert, attentive, and oriented x 3. Language is coherent and fluent without aphasia. Memory, comprehension and ability to follow commands were intact. Cranial nerves II-XII: Optic discs were sharp. Pupils were round and reacted to light. Extraocular movements were full. There was no face, jaw, palate or tongue weakness or atrophy. Facial sensation was normal. Hearing was grossly intact. Shoulder shrug was normal.   Motor exam revealed normal muscle bulk and tone. No atrophy or fasciculations. Manual muscle testing revealed MRC grade 5/5 strength throughout including proximal and distal muscles of the arms and legs. Deep tendon reflexes were 2 at the biceps, brachioradialis, triceps, knee jerk, and ankle jerk. Plantar responses were flexor bilaterally. Sensory exam revealed normal light touch perception. Vibratory perception and proprioception were intact at the toes. Pinprick and temperature were normal. Romberg sign was absent. Complex motor skills revealed normal coordination. Finger-nose-finger intact. Gait was narrow and stable, was able to walk on heels, toes and tandem without any difficulty. ASSESSMENT/ACTIVE PROBLEM LIST:   Episodic migraine  (primary encounter diagnosis)  Intractable migraine with aura without status migrainosus  Psychophysiological insomnia  Vestibular migraine    Discussion/Plan:  Severe episodic vestibular migraines with aura, last several days-  Cannot have medications with caffeine, due to PVC's  Continue prednisone course   Try sumatriptan, 25mg, increase as needed, take with zofran, ok to take sumatriptan daily or every other day when in a migraine cluster, but if migraines become more chronic, will need to make sure that he takes rescue medications not more than 2-3 days per week. If not effective, would switch to oral toradol.   Take flexeril 10mg prn nightly during migraine cluster  Prn benadryl for insomnia during migraine cluster, if flexeril not helping  Discussed triggers of stress, food triggers  Start headache diary    Requested Prescriptions     Signed Prescriptions Disp Refills    ondansetron 4 MG Oral Tablet Dispersible 30 tablet 0     Sig: Take 1 tablet (4 mg total) by mouth every 8 (eight) hours as needed for Nausea. SUMAtriptan 25 MG Oral Tab 9 tablet 2     Sig: Use at onset; repeat once after 2 hours-ONLY 2 IN 24 HR MAX. This is a one month supply    cyclobenzaprine 10 MG Oral Tab 20 tablet 0     Sig: Take 1 tablet nightly as needed for muscle spasms, during a migraine cycle          We discussed in depth regarding the diagnosis, prognosis, treatment. The patient was given ample opportunity to ask questions. All questions and concerns were addressed.      Jessika Knutson,   Neuromuscular and General Neurology  [facility]

## 2023-11-17 ENCOUNTER — OFFICE VISIT (OUTPATIENT)
Dept: FAMILY MEDICINE CLINIC | Facility: CLINIC | Age: 58
End: 2023-11-17
Payer: COMMERCIAL

## 2023-11-17 VITALS
RESPIRATION RATE: 16 BRPM | HEART RATE: 72 BPM | SYSTOLIC BLOOD PRESSURE: 144 MMHG | HEIGHT: 72 IN | OXYGEN SATURATION: 98 % | WEIGHT: 286 LBS | BODY MASS INDEX: 38.74 KG/M2 | TEMPERATURE: 97 F | DIASTOLIC BLOOD PRESSURE: 80 MMHG

## 2023-11-17 DIAGNOSIS — G44.86 CERVICOGENIC HEADACHE: Primary | ICD-10-CM

## 2023-11-17 PROCEDURE — 3008F BODY MASS INDEX DOCD: CPT | Performed by: FAMILY MEDICINE

## 2023-11-17 PROCEDURE — 99213 OFFICE O/P EST LOW 20 MIN: CPT | Performed by: FAMILY MEDICINE

## 2023-11-17 PROCEDURE — 3077F SYST BP >= 140 MM HG: CPT | Performed by: FAMILY MEDICINE

## 2023-11-17 PROCEDURE — 3079F DIAST BP 80-89 MM HG: CPT | Performed by: FAMILY MEDICINE

## 2023-12-01 ENCOUNTER — OFFICE VISIT (OUTPATIENT)
Dept: PODIATRY CLINIC | Facility: CLINIC | Age: 58
End: 2023-12-01

## 2023-12-01 VITALS — DIASTOLIC BLOOD PRESSURE: 74 MMHG | SYSTOLIC BLOOD PRESSURE: 132 MMHG

## 2023-12-01 DIAGNOSIS — L60.0 INGROWN NAIL OF GREAT TOE OF RIGHT FOOT: Primary | ICD-10-CM

## 2023-12-01 DIAGNOSIS — M79.674 TOE PAIN, RIGHT: ICD-10-CM

## 2023-12-01 DIAGNOSIS — L60.3 NAIL DYSTROPHY: ICD-10-CM

## 2023-12-01 PROCEDURE — 3075F SYST BP GE 130 - 139MM HG: CPT | Performed by: STUDENT IN AN ORGANIZED HEALTH CARE EDUCATION/TRAINING PROGRAM

## 2023-12-01 PROCEDURE — 3078F DIAST BP <80 MM HG: CPT | Performed by: STUDENT IN AN ORGANIZED HEALTH CARE EDUCATION/TRAINING PROGRAM

## 2023-12-01 PROCEDURE — 11750 EXCISION NAIL&NAIL MATRIX: CPT | Performed by: STUDENT IN AN ORGANIZED HEALTH CARE EDUCATION/TRAINING PROGRAM

## 2023-12-01 RX ORDER — CEPHALEXIN 500 MG/1
500 CAPSULE ORAL 2 TIMES DAILY
Qty: 14 CAPSULE | Refills: 0 | Status: SHIPPED | OUTPATIENT
Start: 2023-12-01

## 2023-12-01 NOTE — PROGRESS NOTES
Per Dr Jalen Crouch  to draw up . 2.5ml of 1% Lidocaine and 2.5ml marcaine 0.5% for a right hallux medial border Ingrown Toenail Procedure.

## 2023-12-01 NOTE — PROGRESS NOTES
[address] [facility] Podiatry  Progress Note    Jorge Chavez is a 66year old male. Chief Complaint   Patient presents with    Follow - Up     Right ingrown hallux. Pain when touched. HPI:     Patient is a pleasant 19-year-old male presents to clinic for evaluation of painful ingrown nail to medial border of his right great toe. He had temporary relief after slant back procedure last visit but continues to have pain. He is interested in more aggressive procedure at this time. No other complaints are mentioned. Allergies: Bactrim ds, Sulfamethoxazole, and Gabapentin   Current Outpatient Medications   Medication Sig Dispense Refill    cephalexin (KEFLEX) 500 MG Oral Cap Take 1 capsule (500 mg total) by mouth 2 (two) times daily. 14 capsule 0    ondansetron 4 MG Oral Tablet Dispersible Take 1 tablet (4 mg total) by mouth every 8 (eight) hours as needed for Nausea. 30 tablet 0    SUMAtriptan 25 MG Oral Tab Use at onset; repeat once after 2 hours-ONLY 2 IN 24 HR MAX. This is a one month supply 9 tablet 2    cyclobenzaprine 10 MG Oral Tab Take 1 tablet nightly as needed for muscle spasms, during a migraine cycle 20 tablet 0    DILTIAZEM HCL ER COATED BEADS 180 MG Oral Capsule SR 24 Hr TAKE 1 CAPSULE BY MOUTH EVERY DAY 90 capsule 1    Sildenafil Citrate 100 MG Oral Tab Take 1 tablet (100 mg total) by mouth daily as needed for Erectile Dysfunction. Take ~ one hour prior to sexual activity on an empty stomach 30 tablet 5    metoprolol succinate 25 MG Oral Tablet 24 Hr TAKE 1 TABLET BY MOUTH EVERY MORNING AND 1/2 TABLET BY MOUTH AT NIGHT 135 tablet 2    Omega-3 Fatty Acids (FISH OIL CONCENTRATE OR) Take 1 capsule by mouth every evening. Lactobacillus (PROBIOTIC ACIDOPHILUS OR) Take 1 tablet by mouth daily with lunch.       Multiple Vitamins-Minerals (MULTI-VITAMIN/MINERALS) Oral Tab Take 1 tablet by mouth daily with lunch.      magnesium oxide 400 MG Oral Tab Take 1 tablet (400 mg total) by mouth every evening.  30 tablet 0      Past Medical History:   Diagnosis Date    Abdominal pain     Occasional    Acute bilateral low back pain with bilateral sciatica 03/09/2018    Axillary mass, bilateral 04/16/2019    Back pain     Back problem     Bloating     Occasional    Calculus of kidney     Constipation     Occasional    Depression     Diarrhea, unspecified     Occasional    Dizziness     In conjunction with PVCs    Enlarged lymph node     Currently under care    Epididymitis     Essential hypertension     Fatigue     Daily    Flatulence/gas pain/belching     Occasional    Headache disorder     Couple times a week    Heart palpitations     Idiopathic diagnosis    Heartburn     Occasional    Hemorrhoids     High blood pressure     Indigestion     Occasioal    Irregular bowel habits     Kidney stones     Loss of appetite     Occasional    Mass of left thigh 04/16/2019    Migraines     Nausea     Occasional    Pain in joints 2021    Pain with bowel movements     Occasional    Pneumonia due to organism     Sleep apnea     Possible- sleep study scheduled 4/19/19    Sleep disturbance     Daily    Stress     Visual impairment     Wears glasses     Since late teens      Past Surgical History:   Procedure Laterality Date    COLONOSCOPY      COLONOSCOPY      SINUS SURGERY          Family History   Problem Relation Age of Onset    Cancer Father 68        melanoma    Depression Father     Cancer Mother 58        lymphoma    Heart Disorder Mother     Depression Mother     Asthma Mother     Asthma Sister     Kidney Disease Maternal Grandmother     Other (Parkensons) Paternal Grandfather       Social History     Socioeconomic History    Marital status:    Tobacco Use    Smoking status: Never    Smokeless tobacco: Never   Vaping Use    Vaping Use: Never used   Substance and Sexual Activity    Alcohol use: Yes     Comment: Rarely    Drug use: No   Other Topics Concern    Caffeine Concern Yes     Comment: 2 cups decaf daily Exercise Yes     Comment: walks- daily    Seat Belt Yes           REVIEW OF SYSTEMS:     Today reviewed systems as documented below  GENERAL HEALTH: feels well otherwise  SKIN: denies any unusual skin lesions or rashes  RESPIRATORY: denies shortness of breath with exertion  CARDIOVASCULAR: denies chest pain on exertion  GI: denies abdominal pain and denies heartburn  NEURO: denies headaches  MUSCULO: history of back pain      EXAM:   /74 (BP Location: Left arm, Patient Position: Sitting, Cuff Size: adult)   GENERAL: well developed, well nourished, in no apparent distress  EXTREMITIES:   1. Integument: Normal skin temperature and turgor. Medial border of right great toe is incurvated with minor erythema and HPK noted. 2. Vascular: Dorsalis pedis two out of four bilateral and posterior tibial pulses two out of   four bilateral, capillary refill normal.   3. Musculoskeletal: All muscle groups are graded 5 out of 5 in the foot and ankle. Pain noted to medial border of right great toe. 4. Neurological: Normal sharp dull sensation; reflexes normal.        ASSESSMENT AND PLAN:   Diagnoses and all orders for this visit:    Ingrown nail of great toe of right foot  -     cephalexin (KEFLEX) 500 MG Oral Cap; Take 1 capsule (500 mg total) by mouth 2 (two) times daily. Toe pain, right    Nail dystrophy          Plan:    -Patient examined, chart history reviewed. -Discussed etiology of patient's condition and various treatment options.  -Recommend nail avulsion to medial border of right great toe at this time given chronic ingrown nail to site. Discussed procedure in detail with patient  including benefits, risks, and recovery period. Patient agreeable with procedure and written consent was obtained. Procedure as follows:  After prepping site with alcohol, administered local infiltrative block to medial border of right hallux consisting of 5 cc of 1:1 mixture of 0.5% Marcaine plain and 1% lidocaine plain. After sufficient anesthesia was achieved, the digit was prepped with Betadine. Next, using a hemostat, the medial border of the right hallux nail was freed. An English anvil was then used to cut the incurvated portion of the nail down to the nail matrix. A hemostat was once again used to remove the incurvated portion of the nail in its entirety. The site was then copiously irrigated with saline and patted dry. Next, a digital tourniquet was applied and 3 x 46-UUBIAL applications of 72% phenol were applied per standard technique. Once again the site was irrigated and tourniquet was removed. Prompt hyperemic response was noted to the digit. The site was dressed with bacitracin, gauze, and mildly compressive Coban wrap. The patient tolerated the procedure well and there were no complications.      -All soaking and aftercare instructions were discussed with the patient. Informational handout was dispensed.  -Educated patient on acute signs of infection advised patient to seek immediate medical attention if any concerns arise. The patient indicates understanding of these issues and agrees to the plan.     Perico Márquez DPM

## 2023-12-08 ENCOUNTER — OFFICE VISIT (OUTPATIENT)
Dept: PODIATRY CLINIC | Facility: CLINIC | Age: 58
End: 2023-12-08
Payer: COMMERCIAL

## 2023-12-08 DIAGNOSIS — L60.0 INGROWN NAIL OF GREAT TOE OF RIGHT FOOT: Primary | ICD-10-CM

## 2023-12-08 PROCEDURE — 99024 POSTOP FOLLOW-UP VISIT: CPT | Performed by: STUDENT IN AN ORGANIZED HEALTH CARE EDUCATION/TRAINING PROGRAM

## 2023-12-08 NOTE — PROGRESS NOTES
[facility] Podiatry  Progress Note    Miguelina Jaimes is a 66year old male. Chief Complaint   Patient presents with    Follow - Up     Post procedure f/u for Right hallux- no pain         HPI:     Patient is a pleasant 61-year-old male presents to clinic for follow-up status post nail matrixectomy medial border of right great toe. He is doing well without pain at this time. He is perform soaking aftercare as advised. No other complaints are mentioned. Allergies: Bactrim ds, Sulfamethoxazole, and Gabapentin   Current Outpatient Medications   Medication Sig Dispense Refill    cephalexin (KEFLEX) 500 MG Oral Cap Take 1 capsule (500 mg total) by mouth 2 (two) times daily. 14 capsule 0    ondansetron 4 MG Oral Tablet Dispersible Take 1 tablet (4 mg total) by mouth every 8 (eight) hours as needed for Nausea. 30 tablet 0    SUMAtriptan 25 MG Oral Tab Use at onset; repeat once after 2 hours-ONLY 2 IN 24 HR MAX. This is a one month supply 9 tablet 2    cyclobenzaprine 10 MG Oral Tab Take 1 tablet nightly as needed for muscle spasms, during a migraine cycle 20 tablet 0    DILTIAZEM HCL ER COATED BEADS 180 MG Oral Capsule SR 24 Hr TAKE 1 CAPSULE BY MOUTH EVERY DAY 90 capsule 1    Sildenafil Citrate 100 MG Oral Tab Take 1 tablet (100 mg total) by mouth daily as needed for Erectile Dysfunction. Take ~ one hour prior to sexual activity on an empty stomach 30 tablet 5    metoprolol succinate 25 MG Oral Tablet 24 Hr TAKE 1 TABLET BY MOUTH EVERY MORNING AND 1/2 TABLET BY MOUTH AT NIGHT 135 tablet 2    Omega-3 Fatty Acids (FISH OIL CONCENTRATE OR) Take 1 capsule by mouth every evening. Lactobacillus (PROBIOTIC ACIDOPHILUS OR) Take 1 tablet by mouth daily with lunch. Multiple Vitamins-Minerals (MULTI-VITAMIN/MINERALS) Oral Tab Take 1 tablet by mouth daily with lunch.      magnesium oxide 400 MG Oral Tab Take 1 tablet (400 mg total) by mouth every evening.  30 tablet 0      Past Medical History:   Diagnosis Date    Abdominal pain     Occasional    Acute bilateral low back pain with bilateral sciatica 03/09/2018    Axillary mass, bilateral 04/16/2019    Back pain     Back problem     Bloating     Occasional    Calculus of kidney     Constipation     Occasional    Depression     Diarrhea, unspecified     Occasional    Dizziness     In conjunction with PVCs    Enlarged lymph node     Currently under care    Epididymitis     Essential hypertension     Fatigue     Daily    Flatulence/gas pain/belching     Occasional    Headache disorder     Couple times a week    Heart palpitations     Idiopathic diagnosis    Heartburn     Occasional    Hemorrhoids     High blood pressure     Indigestion     Occasioal    Irregular bowel habits     Kidney stones     Loss of appetite     Occasional    Mass of left thigh 04/16/2019    Migraines     Nausea     Occasional    Pain in joints 2021    Pain with bowel movements     Occasional    Pneumonia due to organism     Sleep apnea     Possible- sleep study scheduled 4/19/19    Sleep disturbance     Daily    Stress     Visual impairment     Wears glasses     Since late teens      Past Surgical History:   Procedure Laterality Date    COLONOSCOPY      COLONOSCOPY      SINUS SURGERY          Family History   Problem Relation Age of Onset    Cancer Father 68        melanoma    Depression Father     Cancer Mother 58        lymphoma    Heart Disorder Mother     Depression Mother     Asthma Mother     Asthma Sister     Kidney Disease Maternal Grandmother     Other (Parkensons) Paternal Grandfather       Social History     Socioeconomic History    Marital status:    Tobacco Use    Smoking status: Never    Smokeless tobacco: Never   Vaping Use    Vaping Use: Never used   Substance and Sexual Activity    Alcohol use: Yes     Comment: Rarely    Drug use: No   Other Topics Concern    Caffeine Concern Yes     Comment: 2 cups decaf daily    Exercise Yes     Comment: walks- daily    Seat Belt Yes REVIEW OF SYSTEMS:     Today reviewed systems as documented below  GENERAL HEALTH: feels well otherwise  SKIN: denies any unusual skin lesions or rashes  RESPIRATORY: denies shortness of breath with exertion  CARDIOVASCULAR: denies chest pain on exertion  GI: denies abdominal pain and denies heartburn  NEURO: denies headaches  MUSCULO: history of back pain      EXAM:   There were no vitals taken for this visit. GENERAL: well developed, well nourished, in no apparent distress  EXTREMITIES:   1. Integument: Normal skin temperature and turgor. Site of nail matrixectomy procedure to medial border of right great toe appears to be healing well without acute signs of infection. 2. Vascular: Dorsalis pedis two out of four bilateral and posterior tibial pulses two out of   four bilateral, capillary refill normal.   3. Musculoskeletal: All muscle groups are graded 5 out of 5 in the foot and ankle. Pain noted to medial border of right great toe. 4. Neurological: Normal sharp dull sensation; reflexes normal.        ASSESSMENT AND PLAN:   Diagnoses and all orders for this visit:    Ingrown nail of great toe of right foot            Plan:    -Patient examined, chart history reviewed. -Inspected site of matrixectomy procedure. Site noted to be healing well without acute signs of infection. Minimal bioburden curetted free without incident. Site dressed with bacitracin and Band-Aid. Patient to continue soaking aftercare for another week. He can follow-up as needed if any pain, swelling, or other concerns arise moving forward. The patient indicates understanding of these issues and agrees to the plan.     Perico Márquez DPM

## 2024-05-15 ENCOUNTER — OFFICE VISIT (OUTPATIENT)
Dept: FAMILY MEDICINE CLINIC | Facility: CLINIC | Age: 59
End: 2024-05-15

## 2024-05-15 VITALS
WEIGHT: 276 LBS | SYSTOLIC BLOOD PRESSURE: 130 MMHG | BODY MASS INDEX: 37.38 KG/M2 | DIASTOLIC BLOOD PRESSURE: 86 MMHG | OXYGEN SATURATION: 98 % | HEART RATE: 76 BPM | HEIGHT: 72 IN | RESPIRATION RATE: 16 BRPM

## 2024-05-15 DIAGNOSIS — Z13.0 SCREENING FOR DEFICIENCY ANEMIA: ICD-10-CM

## 2024-05-15 DIAGNOSIS — Z12.5 SCREENING FOR PROSTATE CANCER: ICD-10-CM

## 2024-05-15 DIAGNOSIS — H53.9 VISION DISTURBANCE: ICD-10-CM

## 2024-05-15 DIAGNOSIS — Z13.220 LIPID SCREENING: ICD-10-CM

## 2024-05-15 DIAGNOSIS — R20.0 LEFT UPPER EXTREMITY NUMBNESS: ICD-10-CM

## 2024-05-15 DIAGNOSIS — G43.809 VESTIBULAR MIGRAINE: ICD-10-CM

## 2024-05-15 DIAGNOSIS — G44.86 CERVICOGENIC HEADACHE: Primary | ICD-10-CM

## 2024-05-15 DIAGNOSIS — R20.0 FACIAL NUMBNESS: ICD-10-CM

## 2024-05-15 PROCEDURE — 99214 OFFICE O/P EST MOD 30 MIN: CPT | Performed by: FAMILY MEDICINE

## 2024-05-15 RX ORDER — KETOROLAC TROMETHAMINE 10 MG/1
10 TABLET, FILM COATED ORAL EVERY 6 HOURS PRN
Qty: 16 TABLET | Refills: 0 | Status: SHIPPED | OUTPATIENT
Start: 2024-05-15

## 2024-05-15 NOTE — PROGRESS NOTES
[facility] Progress Note    SUBJECTIVE: Donato Baca 59 year old male is here today for   Chief Complaint   Patient presents with    Migraine     They are very debilitating     Arrythmia/Palpitations       Donato is here to establish care.     Has been going through episodes of migraine headahce, would get chest pain and PVCs, will get light headed and dizzy.    Has idiopathic PVCs, and sees cardiology for this. It continues to persist.    Appears to be in episodes, nothing for a few months and then have a month where frequently occurs. Will get numbness and tinling in let side of face, and feeling of fullness. Will get pain down left side of hisarm. Will have ocular headaches.    Last week had one of the ocular episodes, will get a 'lightning bolt' appearance. Last Wednesday started, and started feeling better yesterday, will have muscle spasms and cramps down the side.    Has hx of lower back issues    Hx of viral meningitis as a child.    Last week felt like a heart attack, and managed no to go to the ER due to knowing this is how it presents, but feels like he should.  Typically is left side.    Tried chiropractor, and didn't help, maybe soft tissue work helped more.    Saw neurology, Dr. Cintron, started sumatriptan, flexeril.    Feels like when he takes medication is more sensitive to it. Felt like the muscle relaxant made his PVCs worse.    Tried cymbalta in the past, bad nightmares nightly with it, and 'brain shock'. Tried gabapentin and made him feel itchy.    Gets dizziness as well when it occurs, muscle spasm.    Has GI issues, cramping, when it happens. Has had normal colonoscopy.    Used to be on a higher dose of metoprolol, felt GI issues were associated with it, he wanted to stop but they recommended they lowered it.    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  for facilities maintenance.  ,  1 child at  home, in college 21 years old  5 kids total other 4 are  with kids      PMH  Past Medical History:    Abdominal pain    Occasional    Acute bilateral low back pain with bilateral sciatica    Axillary mass, bilateral    Back pain    Back problem    Bloating    Occasional    Calculus of kidney    Constipation    Occasional    Depression    Diarrhea, unspecified    Occasional    Dizziness    In conjunction with PVCs    Enlarged lymph node    Currently under care    Epididymitis    Essential hypertension    Fatigue    Daily    Flatulence/gas pain/belching    Occasional    Headache disorder    Couple times a week    Heart palpitations    Idiopathic diagnosis    Heartburn    Occasional    Hemorrhoids    High blood pressure    Indigestion    Occasioal    Irregular bowel habits    Kidney stones    Loss of appetite    Occasional    Mass of left thigh    Migraines    Nausea    Occasional    Pain in joints    Pain with bowel movements    Occasional    Pneumonia due to organism    Sleep apnea    Possible- sleep study scheduled 4/19/19    Sleep disturbance    Daily    Stress    Visual impairment    Wears glasses    Since late teens        PSH  Past Surgical History:   Procedure Laterality Date    Colonoscopy      Colonoscopy      Sinus surgery            Social Hx:  No changes    ROS  See HPI    OBJECTIVE:  /86   Pulse 76   Resp 16   Ht 6' (1.829 m)   Wt 276 lb (125.2 kg)   SpO2 98%   BMI 37.43 kg/m²     Exam  Neuro: CNII-XII grossly intact, with exception of reduced sensatoin in left side of face, lower portion, full motor control. Full strength in upper extremities      Labs:          Meds:   Current Outpatient Medications   Medication Sig Dispense Refill    Triamcinolone Acetonide (NASACORT AQ NA) by Nasal route.      Omeprazole Magnesium (PRILOSEC OR) Take 20 mg by mouth.      Ketorolac Tromethamine 10 MG Oral Tab Take 1 tablet (10 mg total) by mouth every 6 (six) hours as needed for Pain. 16 tablet 0     DILTIAZEM HCL ER COATED BEADS 180 MG Oral Capsule SR 24 Hr TAKE 1 CAPSULE BY MOUTH EVERY DAY 90 capsule 1    Sildenafil Citrate 100 MG Oral Tab Take 1 tablet (100 mg total) by mouth daily as needed for Erectile Dysfunction. Take ~ one hour prior to sexual activity on an empty stomach 30 tablet 5    metoprolol succinate 25 MG Oral Tablet 24 Hr TAKE 1 TABLET BY MOUTH EVERY MORNING AND 1/2 TABLET BY MOUTH AT NIGHT 135 tablet 2    Lactobacillus (PROBIOTIC ACIDOPHILUS OR) Take 1 tablet by mouth daily with lunch.      Multiple Vitamins-Minerals (MULTI-VITAMIN/MINERALS) Oral Tab Take 1 tablet by mouth daily with lunch.      magnesium oxide 400 MG Oral Tab Take 1 tablet (400 mg total) by mouth every evening. 30 tablet 0    Omega-3 Fatty Acids (FISH OIL CONCENTRATE OR) Take 1 capsule by mouth every evening.           Assessment/Plan  Donato was seen today for migraine and arrythmia/palpitations.    Diagnoses and all orders for this visit:    Cervicogenic headache  -     Ketorolac Tromethamine 10 MG Oral Tab; Take 1 tablet (10 mg total) by mouth every 6 (six) hours as needed for Pain.  -     Comp Metabolic Panel (14) [E]; Future    Left upper extremity numbness  -     MRI SPINE CERVICAL (CPT=72141); Future  -     MRI BRAIN (CPT=70551); Future  -     Comp Metabolic Panel (14) [E]; Future  -     CBC W Differential W Platelet [E]; Future    Facial numbness  -     MRI BRAIN (CPT=70551); Future  -     Comp Metabolic Panel (14) [E]; Future  -     CBC W Differential W Platelet [E]; Future    Vision disturbance  -     MRI BRAIN (CPT=70551); Future    Vestibular migraine  -     Ketorolac Tromethamine 10 MG Oral Tab; Take 1 tablet (10 mg total) by mouth every 6 (six) hours as needed for Pain.    Screening for deficiency anemia  -     CBC W Differential W Platelet [E]; Future    Lipid screening  -     Lipid Panel [E]; Future    Screening for prostate cancer  -     PSA, Total (Screening) [E]; Future         Concern is possible radicular  syndrome or issue leading to sensation loss in left side of body. Imaging to confirm no lesions as cause. If normal likely complex migraine, keep appointment in fall with neurology Will initiate plan noted in previous neurology note         Total Time spent with patient and coordinating care:  35 minutes.    Follow up: 1-2 months      Avi Pratt MD

## 2024-05-17 ENCOUNTER — LAB ENCOUNTER (OUTPATIENT)
Dept: LAB | Age: 59
End: 2024-05-17
Attending: FAMILY MEDICINE

## 2024-05-17 ENCOUNTER — OFFICE VISIT (OUTPATIENT)
Dept: PODIATRY CLINIC | Facility: CLINIC | Age: 59
End: 2024-05-17

## 2024-05-17 DIAGNOSIS — L60.0 INGROWN NAIL OF GREAT TOE OF RIGHT FOOT: Primary | ICD-10-CM

## 2024-05-17 DIAGNOSIS — Z13.220 LIPID SCREENING: ICD-10-CM

## 2024-05-17 DIAGNOSIS — Z12.5 SCREENING FOR PROSTATE CANCER: ICD-10-CM

## 2024-05-17 DIAGNOSIS — R20.0 FACIAL NUMBNESS: ICD-10-CM

## 2024-05-17 DIAGNOSIS — Z13.0 SCREENING FOR DEFICIENCY ANEMIA: ICD-10-CM

## 2024-05-17 DIAGNOSIS — M79.674 TOE PAIN, RIGHT: ICD-10-CM

## 2024-05-17 DIAGNOSIS — R20.0 LEFT UPPER EXTREMITY NUMBNESS: ICD-10-CM

## 2024-05-17 DIAGNOSIS — G44.86 CERVICOGENIC HEADACHE: ICD-10-CM

## 2024-05-17 LAB
ALBUMIN SERPL-MCNC: 3.9 G/DL (ref 3.4–5)
ALBUMIN/GLOB SERPL: 1 {RATIO} (ref 1–2)
ALP LIVER SERPL-CCNC: 69 U/L
ALT SERPL-CCNC: 42 U/L
ANION GAP SERPL CALC-SCNC: 6 MMOL/L (ref 0–18)
AST SERPL-CCNC: 22 U/L (ref 15–37)
BASOPHILS # BLD AUTO: 0.02 X10(3) UL (ref 0–0.2)
BASOPHILS NFR BLD AUTO: 0.3 %
BILIRUB SERPL-MCNC: 0.8 MG/DL (ref 0.1–2)
BUN BLD-MCNC: 15 MG/DL (ref 9–23)
CALCIUM BLD-MCNC: 9.3 MG/DL (ref 8.5–10.1)
CHLORIDE SERPL-SCNC: 107 MMOL/L (ref 98–112)
CHOLEST SERPL-MCNC: 200 MG/DL (ref ?–200)
CO2 SERPL-SCNC: 26 MMOL/L (ref 21–32)
COMPLEXED PSA SERPL-MCNC: 2.63 NG/ML (ref ?–4)
CREAT BLD-MCNC: 0.82 MG/DL
EGFRCR SERPLBLD CKD-EPI 2021: 102 ML/MIN/1.73M2 (ref 60–?)
EOSINOPHIL # BLD AUTO: 0.04 X10(3) UL (ref 0–0.7)
EOSINOPHIL NFR BLD AUTO: 0.5 %
ERYTHROCYTE [DISTWIDTH] IN BLOOD BY AUTOMATED COUNT: 12.1 %
FASTING PATIENT LIPID ANSWER: YES
FASTING STATUS PATIENT QL REPORTED: YES
GLOBULIN PLAS-MCNC: 3.9 G/DL (ref 2.8–4.4)
GLUCOSE BLD-MCNC: 103 MG/DL (ref 70–99)
HCT VFR BLD AUTO: 46.9 %
HDLC SERPL-MCNC: 60 MG/DL (ref 40–59)
HGB BLD-MCNC: 15.9 G/DL
IMM GRANULOCYTES # BLD AUTO: 0.02 X10(3) UL (ref 0–1)
IMM GRANULOCYTES NFR BLD: 0.3 %
LDLC SERPL CALC-MCNC: 122 MG/DL (ref ?–100)
LYMPHOCYTES # BLD AUTO: 2.25 X10(3) UL (ref 1–4)
LYMPHOCYTES NFR BLD AUTO: 30.4 %
MCH RBC QN AUTO: 30.9 PG (ref 26–34)
MCHC RBC AUTO-ENTMCNC: 33.9 G/DL (ref 31–37)
MCV RBC AUTO: 91.1 FL
MONOCYTES # BLD AUTO: 0.68 X10(3) UL (ref 0.1–1)
MONOCYTES NFR BLD AUTO: 9.2 %
NEUTROPHILS # BLD AUTO: 4.4 X10 (3) UL (ref 1.5–7.7)
NEUTROPHILS # BLD AUTO: 4.4 X10(3) UL (ref 1.5–7.7)
NEUTROPHILS NFR BLD AUTO: 59.3 %
NONHDLC SERPL-MCNC: 140 MG/DL (ref ?–130)
OSMOLALITY SERPL CALC.SUM OF ELEC: 289 MOSM/KG (ref 275–295)
PLATELET # BLD AUTO: 213 10(3)UL (ref 150–450)
POTASSIUM SERPL-SCNC: 4 MMOL/L (ref 3.5–5.1)
PROT SERPL-MCNC: 7.8 G/DL (ref 6.4–8.2)
RBC # BLD AUTO: 5.15 X10(6)UL
SODIUM SERPL-SCNC: 139 MMOL/L (ref 136–145)
TRIGL SERPL-MCNC: 101 MG/DL (ref 30–149)
VLDLC SERPL CALC-MCNC: 18 MG/DL (ref 0–30)
WBC # BLD AUTO: 7.4 X10(3) UL (ref 4–11)

## 2024-05-17 PROCEDURE — 36415 COLL VENOUS BLD VENIPUNCTURE: CPT

## 2024-05-17 PROCEDURE — 85025 COMPLETE CBC W/AUTO DIFF WBC: CPT

## 2024-05-17 PROCEDURE — 80053 COMPREHEN METABOLIC PANEL: CPT

## 2024-05-17 PROCEDURE — 80061 LIPID PANEL: CPT

## 2024-05-20 ENCOUNTER — TELEPHONE (OUTPATIENT)
Dept: PODIATRY CLINIC | Facility: CLINIC | Age: 59
End: 2024-05-20

## 2024-05-20 VITALS — DIASTOLIC BLOOD PRESSURE: 84 MMHG | SYSTOLIC BLOOD PRESSURE: 146 MMHG

## 2024-05-20 RX ORDER — CEPHALEXIN 500 MG/1
500 CAPSULE ORAL 2 TIMES DAILY
Qty: 14 CAPSULE | Refills: 0 | Status: SHIPPED | OUTPATIENT
Start: 2024-05-20

## 2024-05-20 NOTE — PROGRESS NOTES
Per Dr block to draw up .2.5ml of 1% Lidocaine and 2.5ml marcaine 0.5% for a right hallux Ingrown lateral borders Toenail Procedure.

## 2024-05-20 NOTE — PROGRESS NOTES
[facility]  Progress Note    Donato aBca is a 59 year old male.   Chief Complaint   Patient presents with    Follow - Up     Right hallux- patient states he got removed- feels discomfort- feel like it coming back- irritating- patient denies pain          HPI:     Patient is a pleasant 59-year-old male presents to clinic for evaluation of painful ingrown nail to medial border of his right great toe.  He had permanent nail procedure performed in the past but his nail has unfortunately started to regrow.  It is not very painful yet but it is starting to cause some discomfort.  He would like permanent procedure performed again.  No other complaints are mentioned.      Allergies: Bactrim ds, Sulfamethoxazole, and Gabapentin   Current Outpatient Medications   Medication Sig Dispense Refill    cephalexin (KEFLEX) 500 MG Oral Cap Take 1 capsule (500 mg total) by mouth 2 (two) times daily. 14 capsule 0    Triamcinolone Acetonide (NASACORT AQ NA) by Nasal route.      Omeprazole Magnesium (PRILOSEC OR) Take 20 mg by mouth.      Ketorolac Tromethamine 10 MG Oral Tab Take 1 tablet (10 mg total) by mouth every 6 (six) hours as needed for Pain. 16 tablet 0    DILTIAZEM HCL ER COATED BEADS 180 MG Oral Capsule SR 24 Hr TAKE 1 CAPSULE BY MOUTH EVERY DAY 90 capsule 1    Sildenafil Citrate 100 MG Oral Tab Take 1 tablet (100 mg total) by mouth daily as needed for Erectile Dysfunction. Take ~ one hour prior to sexual activity on an empty stomach 30 tablet 5    metoprolol succinate 25 MG Oral Tablet 24 Hr TAKE 1 TABLET BY MOUTH EVERY MORNING AND 1/2 TABLET BY MOUTH AT NIGHT 135 tablet 2    Omega-3 Fatty Acids (FISH OIL CONCENTRATE OR) Take 1 capsule by mouth every evening.      Lactobacillus (PROBIOTIC ACIDOPHILUS OR) Take 1 tablet by mouth daily with lunch.      Multiple Vitamins-Minerals (MULTI-VITAMIN/MINERALS) Oral Tab Take 1 tablet by mouth daily with lunch.      magnesium oxide 400 MG Oral Tab Take 1 tablet (400 mg  total) by mouth every evening. 30 tablet 0      Past Medical History:    Abdominal pain    Occasional    Acute bilateral low back pain with bilateral sciatica    Axillary mass, bilateral    Back pain    Back problem    Bloating    Occasional    Calculus of kidney    Constipation    Occasional    Depression    Diarrhea, unspecified    Occasional    Dizziness    In conjunction with PVCs    Enlarged lymph node    Currently under care    Epididymitis    Essential hypertension    Fatigue    Daily    Flatulence/gas pain/belching    Occasional    Headache disorder    Couple times a week    Heart palpitations    Idiopathic diagnosis    Heartburn    Occasional    Hemorrhoids    High blood pressure    Indigestion    Occasioal    Irregular bowel habits    Kidney stones    Loss of appetite    Occasional    Mass of left thigh    Migraines    Nausea    Occasional    Pain in joints    Pain with bowel movements    Occasional    Pneumonia due to organism    Sleep apnea    Possible- sleep study scheduled 4/19/19    Sleep disturbance    Daily    Stress    Visual impairment    Wears glasses    Since late teens      Past Surgical History:   Procedure Laterality Date    Colonoscopy      Colonoscopy      Sinus surgery          Family History   Problem Relation Age of Onset    Cancer Father 76        melanoma    Depression Father     Cancer Mother 62        lymphoma    Heart Disorder Mother     Depression Mother     Asthma Mother     Asthma Sister     Kidney Disease Maternal Grandmother     Other (Parkensons) Paternal Grandfather       Social History     Socioeconomic History    Marital status:    Tobacco Use    Smoking status: Never    Smokeless tobacco: Never   Vaping Use    Vaping status: Never Used   Substance and Sexual Activity    Alcohol use: Yes     Comment: Rarely    Drug use: No   Other Topics Concern    Caffeine Concern Yes     Comment: 2 cups decaf daily    Exercise Yes     Comment: walks- daily    Seat Belt Yes            REVIEW OF SYSTEMS:     Today reviewed systems as documented below  GENERAL HEALTH: feels well otherwise  SKIN: denies any unusual skin lesions or rashes  RESPIRATORY: denies shortness of breath with exertion  CARDIOVASCULAR: denies chest pain on exertion  GI: denies abdominal pain and denies heartburn  NEURO: denies headaches  MUSCULO: history of back pain      EXAM:   /84 (BP Location: Right arm, Patient Position: Sitting, Cuff Size: adult)   GENERAL: well developed, well nourished, in no apparent distress  EXTREMITIES:   1. Integument: Normal skin temperature and turgor.  Medial border of right great toe is incurvated and starting to regrow.  2. Vascular: Dorsalis pedis two out of four bilateral and posterior tibial pulses two out of   four bilateral, capillary refill normal.   3. Musculoskeletal: All muscle groups are graded 5 out of 5 in the foot and ankle.  Pain noted to medial border of right great toe.   4. Neurological: Normal sharp dull sensation; reflexes normal.        ASSESSMENT AND PLAN:   Diagnoses and all orders for this visit:    Ingrown nail of great toe of right foot  -     cephalexin (KEFLEX) 500 MG Oral Cap; Take 1 capsule (500 mg total) by mouth 2 (two) times daily.    Toe pain, right          Plan:    -Patient examined, chart history reviewed.  -Discussed etiology of patient's condition and various treatment options.  -Recommend revision nail matrixectomy to medial border of right great toe at this time given chronic ingrown nail to site. Discussed procedure in detail with patient  including benefits, risks, and recovery period.  Patient agreeable with procedure and written consent was obtained.    Procedure as follows:  After prepping site with alcohol, administered local infiltrative block to medial border of right hallux consisting of 5 cc of 1:1 mixture of 0.5% Marcaine plain and 1% lidocaine plain.  After sufficient anesthesia was achieved, the digit was prepped with Betadine.   Next, using a hemostat, the medial border of the right hallux nail was freed.  An English anvil was then used to cut the incurvated portion of the nail down to the nail matrix.  A hemostat was once again used to remove the incurvated portion of the nail in its entirety.  This site was  prepped with a curette.  The site was then copiously irrigated with saline and patted dry.  Next, a digital tourniquet was applied and 3 x 60-second applications of 89% phenol were applied per standard technique.  Once again the site was irrigated and tourniquet was removed.  Prompt hyperemic response was noted to the digit.  The site was dressed with bacitracin, gauze, and mildly compressive Coban wrap.  The patient tolerated the procedure well and there were no complications.      -All soaking and aftercare instructions were discussed with the patient.  Informational handout was dispensed.  -Rx Keflex.  -Will monitor site as we use morphine on this time given recurrence.  If symptoms were to recur again could consider Winograd procedure.  -Educated patient on acute signs of infection advised patient to seek immediate medical attention if any concerns arise.     RTC 1 to 2 weeks.    The patient indicates understanding of these issues and agrees to the plan.    Peter Hinton DPM

## 2024-05-20 NOTE — TELEPHONE ENCOUNTER
Patient was seen in office on 5/17 and awaiting script for Keflex to be sent to [facility]. Please update patient through [employer], thanks.

## 2024-05-30 ENCOUNTER — OFFICE VISIT (OUTPATIENT)
Dept: PODIATRY CLINIC | Facility: CLINIC | Age: 59
End: 2024-05-30

## 2024-05-30 DIAGNOSIS — L60.0 INGROWN NAIL OF GREAT TOE OF RIGHT FOOT: Primary | ICD-10-CM

## 2024-05-30 DIAGNOSIS — M79.674 TOE PAIN, RIGHT: ICD-10-CM

## 2024-05-30 NOTE — PROGRESS NOTES
[facility]  Progress Note    Donato Baca is a 59 year old male.   Chief Complaint   Patient presents with    Post-Op     R hallux nail sx on 5/17/2024- stated no drainage- denies pain         HPI:     Patient is a pleasant 59-year-old male presents to clinic for follow-up status post matrixectomy medial border right great toe.  He has been performing soaking and aftercare as advised.  He has taken Keflex as prescribed.  He is without pain or any signs of infection.    Allergies: Bactrim ds, Sulfamethoxazole, and Gabapentin   Current Outpatient Medications   Medication Sig Dispense Refill    cephalexin (KEFLEX) 500 MG Oral Cap Take 1 capsule (500 mg total) by mouth 2 (two) times daily. 14 capsule 0    Triamcinolone Acetonide (NASACORT AQ NA) by Nasal route.      Omeprazole Magnesium (PRILOSEC OR) Take 20 mg by mouth.      Ketorolac Tromethamine 10 MG Oral Tab Take 1 tablet (10 mg total) by mouth every 6 (six) hours as needed for Pain. 16 tablet 0    DILTIAZEM HCL ER COATED BEADS 180 MG Oral Capsule SR 24 Hr TAKE 1 CAPSULE BY MOUTH EVERY DAY 90 capsule 1    Sildenafil Citrate 100 MG Oral Tab Take 1 tablet (100 mg total) by mouth daily as needed for Erectile Dysfunction. Take ~ one hour prior to sexual activity on an empty stomach 30 tablet 5    metoprolol succinate 25 MG Oral Tablet 24 Hr TAKE 1 TABLET BY MOUTH EVERY MORNING AND 1/2 TABLET BY MOUTH AT NIGHT 135 tablet 2    Omega-3 Fatty Acids (FISH OIL CONCENTRATE OR) Take 1 capsule by mouth every evening.      Lactobacillus (PROBIOTIC ACIDOPHILUS OR) Take 1 tablet by mouth daily with lunch.      Multiple Vitamins-Minerals (MULTI-VITAMIN/MINERALS) Oral Tab Take 1 tablet by mouth daily with lunch.      magnesium oxide 400 MG Oral Tab Take 1 tablet (400 mg total) by mouth every evening. 30 tablet 0      Past Medical History:    Abdominal pain    Occasional    Acute bilateral low back pain with bilateral sciatica    Axillary mass, bilateral    Back pain     Back problem    Bloating    Occasional    Calculus of kidney    Constipation    Occasional    Depression    Diarrhea, unspecified    Occasional    Dizziness    In conjunction with PVCs    Enlarged lymph node    Currently under care    Epididymitis    Essential hypertension    Fatigue    Daily    Flatulence/gas pain/belching    Occasional    Headache disorder    Couple times a week    Heart palpitations    Idiopathic diagnosis    Heartburn    Occasional    Hemorrhoids    High blood pressure    Indigestion    Occasioal    Irregular bowel habits    Kidney stones    Loss of appetite    Occasional    Mass of left thigh    Migraines    Nausea    Occasional    Pain in joints    Pain with bowel movements    Occasional    Pneumonia due to organism    Sleep apnea    Possible- sleep study scheduled 4/19/19    Sleep disturbance    Daily    Stress    Visual impairment    Wears glasses    Since late teens      Past Surgical History:   Procedure Laterality Date    Colonoscopy      Colonoscopy      Sinus surgery          Family History   Problem Relation Age of Onset    Cancer Father 76        melanoma    Depression Father     Cancer Mother 62        lymphoma    Heart Disorder Mother     Depression Mother     Asthma Mother     Asthma Sister     Kidney Disease Maternal Grandmother     Other (Parkensons) Paternal Grandfather       Social History     Socioeconomic History    Marital status:    Tobacco Use    Smoking status: Never    Smokeless tobacco: Never   Vaping Use    Vaping status: Never Used   Substance and Sexual Activity    Alcohol use: Yes     Comment: Rarely    Drug use: No   Other Topics Concern    Caffeine Concern Yes     Comment: 2 cups decaf daily    Exercise Yes     Comment: walks- daily    Seat Belt Yes           REVIEW OF SYSTEMS:     Today reviewed systems as documented below  GENERAL HEALTH: feels well otherwise  SKIN: denies any unusual skin lesions or rashes  RESPIRATORY: denies shortness of breath with  exertion  CARDIOVASCULAR: denies chest pain on exertion  GI: denies abdominal pain and denies heartburn  NEURO: denies headaches  MUSCULO: history of back pain      EXAM:   There were no vitals taken for this visit.  GENERAL: well developed, well nourished, in no apparent distress  EXTREMITIES:   1. Integument: Normal skin temperature and turgor.  Site of matrixectomy site at medial border of right great toe is healing well with minimal bioburden.  2. Vascular: Dorsalis pedis two out of four bilateral and posterior tibial pulses two out of   four bilateral, capillary refill normal.   3. Musculoskeletal: All muscle groups are graded 5 out of 5 in the foot and ankle.  Pain noted to medial border of right great toe.   4. Neurological: Normal sharp dull sensation; reflexes normal.        ASSESSMENT AND PLAN:   Diagnoses and all orders for this visit:    Ingrown nail of great toe of right foot    Toe pain, right          Plan:    -Patient examined, chart history reviewed.  -Discussed etiology of patient's condition and various treatment options.  -Inspected site of matrixectomy procedure medial border of right great toe.  Noted be healing well without concerns at this time.  -Debrided curetted free with dermal curette without incident.  Site cleansed and dressed with bacitracin and Band-Aid.  Continue some dressing changes for couple more days until symptoms fully resolved.  -Can follow-up as needed if any pain, acute signs of infection, recurrence arise.  -I appreciate the opportunity participate in patient's care.    The patient indicates understanding of these issues and agrees to the plan.    Peter Hinton DPM

## 2024-05-31 ENCOUNTER — OFFICE VISIT (OUTPATIENT)
Dept: FAMILY MEDICINE CLINIC | Facility: CLINIC | Age: 59
End: 2024-05-31

## 2024-05-31 VITALS
HEART RATE: 82 BPM | DIASTOLIC BLOOD PRESSURE: 80 MMHG | SYSTOLIC BLOOD PRESSURE: 118 MMHG | BODY MASS INDEX: 36.7 KG/M2 | OXYGEN SATURATION: 98 % | RESPIRATION RATE: 16 BRPM | HEIGHT: 72 IN | WEIGHT: 271 LBS

## 2024-05-31 DIAGNOSIS — Z00.00 WELLNESS EXAMINATION: Primary | ICD-10-CM

## 2024-05-31 DIAGNOSIS — I77.810 ASCENDING AORTA DILATATION (HCC): ICD-10-CM

## 2024-05-31 DIAGNOSIS — I49.3 PVCS (PREMATURE VENTRICULAR CONTRACTIONS): ICD-10-CM

## 2024-05-31 PROBLEM — K63.5 COLON POLYP: Status: RESOLVED | Noted: 2019-05-09 | Resolved: 2024-05-31

## 2024-05-31 PROBLEM — R07.9 CHEST PAIN WITH HIGH RISK FOR CARDIAC ETIOLOGY: Status: RESOLVED | Noted: 2023-04-23 | Resolved: 2024-05-31

## 2024-05-31 PROBLEM — R79.89 AZOTEMIA: Status: RESOLVED | Noted: 2021-06-06 | Resolved: 2024-05-31

## 2024-05-31 PROBLEM — J44.9 CHRONIC OBSTRUCTIVE PULMONARY DISEASE, UNSPECIFIED (HCC): Status: RESOLVED | Noted: 2023-06-22 | Resolved: 2024-05-31

## 2024-05-31 PROBLEM — R07.9 CHEST PAIN OF UNCERTAIN ETIOLOGY: Status: RESOLVED | Noted: 2018-10-24 | Resolved: 2024-05-31

## 2024-05-31 PROBLEM — D69.6 THROMBOCYTOPENIA: Status: RESOLVED | Noted: 2019-10-17 | Resolved: 2024-05-31

## 2024-05-31 PROBLEM — D69.6 THROMBOCYTOPENIA (HCC): Status: RESOLVED | Noted: 2019-10-17 | Resolved: 2024-05-31

## 2024-05-31 PROBLEM — K63.5 POLYP OF COLON: Status: RESOLVED | Noted: 2022-05-31 | Resolved: 2024-05-31

## 2024-05-31 PROCEDURE — 99396 PREV VISIT EST AGE 40-64: CPT | Performed by: FAMILY MEDICINE

## 2024-05-31 NOTE — PROGRESS NOTES
HPI:   Donato Baca is a 59 year old male who presents for an Annual Health Visit.     Donato is here in follow up and wellness visit.    MRIs scheduled June 27th.    Wants to discuss neck which gives him issues. OR thinks may be due to severe headaches, left ear always feeling full and has tinnitus in that ear.    Has taken toradol recently, and does find it helpful.    Had bad reaction to cymbalta in the past, and tried gabapentin in the past and felt itchy.     Has started taking prilosec.    The 10-year ASCVD risk score (Mary Kay CHATTERJEE, et al., 2019) is: 5.8%    Values used to calculate the score:      Age: 59 years      Sex: Male      Is Non- : No      Diabetic: No      Tobacco smoker: No      Systolic Blood Pressure: 118 mmHg      Is BP treated: Yes      HDL Cholesterol: 60 mg/dL      Total Cholesterol: 200 mg/dL    Tuesday started with heavy dizziness, muscle pain and shoulder and headache started, took the toradol and lightened the symptoms and resolved within a day or so vs days of symptoms.    Then taken a week or so prior to that with similar benefit.        Allergies:     Allergies   Allergen Reactions    Bactrim Ds HIVES    Sulfamethoxazole UNKNOWN     Cerner Allergy Text Annotation: sulfamethoxazole    Gabapentin RASH and OTHER (SEE COMMENTS)     Tingle        CURRENT MEDICATIONS   Current Outpatient Medications   Medication Sig Dispense Refill    Triamcinolone Acetonide (NASACORT AQ NA) by Nasal route.      Omeprazole Magnesium (PRILOSEC OR) Take 20 mg by mouth.      Ketorolac Tromethamine 10 MG Oral Tab Take 1 tablet (10 mg total) by mouth every 6 (six) hours as needed for Pain. 16 tablet 0    DILTIAZEM HCL ER COATED BEADS 180 MG Oral Capsule SR 24 Hr TAKE 1 CAPSULE BY MOUTH EVERY DAY 90 capsule 1    Sildenafil Citrate 100 MG Oral Tab Take 1 tablet (100 mg total) by mouth daily as needed for Erectile Dysfunction. Take ~ one hour prior to sexual activity on an empty stomach 30  tablet 5    metoprolol succinate 25 MG Oral Tablet 24 Hr TAKE 1 TABLET BY MOUTH EVERY MORNING AND 1/2 TABLET BY MOUTH AT NIGHT 135 tablet 2    Lactobacillus (PROBIOTIC ACIDOPHILUS OR) Take 1 tablet by mouth daily with lunch.      Multiple Vitamins-Minerals (MULTI-VITAMIN/MINERALS) Oral Tab Take 1 tablet by mouth daily with lunch.      magnesium oxide 400 MG Oral Tab Take 1 tablet (400 mg total) by mouth every evening. 30 tablet 0    Omega-3 Fatty Acids (FISH OIL CONCENTRATE OR) Take 1 capsule by mouth every evening.        HISTORICAL INFORMATION   Past Medical History:    Abdominal pain    Occasional    Acute bilateral low back pain with bilateral sciatica    Axillary mass, bilateral    Back pain    Back problem    Bloating    Occasional    Calculus of kidney    Constipation    Occasional    Depression    Diarrhea, unspecified    Occasional    Dizziness    In conjunction with PVCs    Enlarged lymph node    Currently under care    Epididymitis    Essential hypertension    Fatigue    Daily    Flatulence/gas pain/belching    Occasional    Headache disorder    Couple times a week    Heart palpitations    Idiopathic diagnosis    Heartburn    Occasional    Hemorrhoids    High blood pressure    Indigestion    Occasioal    Irregular bowel habits    Kidney stones    Loss of appetite    Occasional    Mass of left thigh    Migraines    Nausea    Occasional    Pain in joints    Pain with bowel movements    Occasional    Pneumonia due to organism    Sleep apnea    Possible- sleep study scheduled 4/19/19    Sleep disturbance    Daily    Stress    Visual impairment    Wears glasses    Since late teens      Past Surgical History:   Procedure Laterality Date    Colonoscopy      Colonoscopy      Sinus surgery          Family History   Problem Relation Age of Onset    Cancer Father 76        melanoma    Depression Father     Cancer Mother 62        lymphoma    Heart Disorder Mother     Depression Mother     Asthma Mother     Asthma  Sister     Kidney Disease Maternal Grandmother     Other (Fernanda) Paternal Grandfather       SOCIAL HISTORY   Social History     Socioeconomic History    Marital status:    Tobacco Use    Smoking status: Never    Smokeless tobacco: Never   Vaping Use    Vaping status: Never Used   Substance and Sexual Activity    Alcohol use: Yes     Comment: Rarely    Drug use: No   Other Topics Concern    Caffeine Concern Yes     Comment: 2 cups decaf daily    Exercise Yes     Comment: walks- daily    Seat Belt Yes     Social Determinants of Health      [facility]    Social Connections    Received from [facility], [facility]    Housing Stability     Social History     Social History Narrative    Not on file        REVIEW OF SYSTEMS:     Constitutional: negative  Eyes: negative  ENT: negative  Respiratory: negative  Cardiovascular: negative  Gastrointestinal: negative  Integument/Breast: negative  Genitourinary: negative  Heme/Lymph: negative  Musculoskeletal: negative  Neurological: negative  Psych: negative  Endocrine: negative  Allergic/Immune: negative    EXAM:   /80   Pulse 82   Resp 16   Ht 6' (1.829 m)   Wt 271 lb (122.9 kg)   SpO2 98%   BMI 36.75 kg/m²    Wt Readings from Last 6 Encounters:   05/31/24 271 lb (122.9 kg)   05/15/24 276 lb (125.2 kg)   11/17/23 286 lb (129.7 kg)   11/02/23 290 lb (131.5 kg)   10/31/23 293 lb (132.9 kg)   10/03/23 280 lb (127 kg)     Body mass index is 36.75 kg/m².    General: alert, appears stated age, and cooperative  Head: Normocephalic, without obvious abnormality, atraumatic  Eyes: conjunctivae/corneas clear. PERRL, EOM's intact. Fundi benign.  Ears: normal TM's and external ear canals both ears  Nose: Nares normal. Septum midline. Mucosa normal. No drainage or sinus tenderness.  Throat: lips, mucosa, and tongue normal; teeth and gums normal  Neck: no adenopathy,  no carotid bruit, no JVD, supple, symmetrical, trachea midline, and thyroid not enlarged, symmetric, no tenderness/mass/nodules  Heart: S1, S2 normal, no murmur, click, rub or gallop, regular rate and rhythm  Lungs: clear to auscultation bilaterally  Chest wall: no tenderness  Abdomen: soft, non-tender; bowel sounds normal; no masses,  no organomegaly  : deferred  Back: symmetric, no curvature. ROM normal. No CVA tenderness.  Extremities: extremities normal, atraumatic, no cyanosis or edema  Pulses: 2+ and symmetric  Skin: Skin color, texture, turgor normal. No rashes or lesions  Lymph Nodes: Cervical, supraclavicular, and axillary nodes normal.  Neurologic: Grossly normal    ASSESSMENT AND PLAN:   Donato was seen today for physical.    Diagnoses and all orders for this visit:    Wellness examination    PVCs (premature ventricular contractions)    Ascending aorta dilatation (HCC)  -     CTA GATED THORACIC AORTA (CPT=71275); Future    Will get imaging to monitor aorta as possible increase by US 2 years back.    Reviewed labs and plan on following up after imaging  There are no Patient Instructions on file for this visit.    The patient indicates understanding of these issues and agrees to the plan.    Problem List:  Patient Active Problem List   Diagnosis    Allergic rhinitis    ADAM (obstructive sleep apnea)    Fatty liver    Benign essential HTN    Obesity (BMI 35.0-39.9 without comorbidity)    Asbestos exposure    Chest pain, atypical    Hyperglycemia    Palpitations    Snoring    GERD (gastroesophageal reflux disease)    PVC (premature ventricular contraction)    Nonintractable headache, unspecified chronicity pattern, unspecified headache type    History of adenomatous polyp of colon    Diaphoresis    Ascending aorta dilatation (HCC)       Avi Pratt MD  5/31/2024  11:04 AM

## 2024-06-26 NOTE — IMAGING NOTE
Pt called and instructed to arrive 15 min prior to CTA gated study on 6/28/24. Park in the south parking garage, eat light breakfast/lunch, hydrate, hold caffeine/decaf/chocolate x 12 hrs prior, hold long acting nitrates, but may take all other meds. Pt verbalized understanding and denies further questions.

## 2024-06-27 ENCOUNTER — HOSPITAL ENCOUNTER (OUTPATIENT)
Dept: MRI IMAGING | Age: 59
Discharge: HOME | End: 2024-06-27
Attending: FAMILY MEDICINE

## 2024-06-27 DIAGNOSIS — R20.0 LEFT UPPER EXTREMITY NUMBNESS: ICD-10-CM

## 2024-06-27 DIAGNOSIS — R20.0 FACIAL NUMBNESS: ICD-10-CM

## 2024-06-27 DIAGNOSIS — H53.9 VISION DISTURBANCE: ICD-10-CM

## 2024-06-27 PROCEDURE — 70551 MRI BRAIN STEM W/O DYE: CPT | Performed by: FAMILY MEDICINE

## 2024-06-27 PROCEDURE — 72141 MRI NECK SPINE W/O DYE: CPT | Performed by: FAMILY MEDICINE

## 2024-06-28 ENCOUNTER — HOSPITAL ENCOUNTER (OUTPATIENT)
Dept: CT IMAGING | Facility: HOSPITAL | Age: 59
Discharge: HOME | End: 2024-06-28
Attending: FAMILY MEDICINE
Payer: COMMERCIAL

## 2024-06-28 VITALS — DIASTOLIC BLOOD PRESSURE: 65 MMHG | SYSTOLIC BLOOD PRESSURE: 103 MMHG | HEART RATE: 76 BPM

## 2024-06-28 DIAGNOSIS — I77.810 ASCENDING AORTA DILATATION (HCC): ICD-10-CM

## 2024-06-28 LAB
CREAT BLD-MCNC: 0.8 MG/DL
EGFRCR SERPLBLD CKD-EPI 2021: 103 ML/MIN/1.73M2 (ref 60–?)

## 2024-06-28 PROCEDURE — 82565 ASSAY OF CREATININE: CPT

## 2024-06-28 PROCEDURE — 71275 CT ANGIOGRAPHY CHEST: CPT | Performed by: FAMILY MEDICINE

## 2024-06-28 NOTE — IMAGING NOTE
0945- Pt A+O x4. Procedure explained and questions answered.  Pt ambulatory and assisted to CT table.  .  See flowsheet for VS.    CT tech Tarazo     Contrast= 120  Saline= 160  Average HR= 67    1006- Pt tolerated scan well.  Denies S/S of contrast reaction.  IV d/c'd at 1007.  Cath intact, bleeding controlled.  Pt ambulatory and escorted to changing room for discharge home.

## 2024-07-12 ENCOUNTER — OFFICE VISIT (OUTPATIENT)
Dept: FAMILY MEDICINE CLINIC | Facility: CLINIC | Age: 59
End: 2024-07-12
Payer: COMMERCIAL

## 2024-07-12 VITALS
RESPIRATION RATE: 16 BRPM | OXYGEN SATURATION: 98 % | WEIGHT: 268 LBS | SYSTOLIC BLOOD PRESSURE: 128 MMHG | DIASTOLIC BLOOD PRESSURE: 80 MMHG | BODY MASS INDEX: 36.3 KG/M2 | HEIGHT: 72 IN | HEART RATE: 66 BPM

## 2024-07-12 DIAGNOSIS — I10 BENIGN ESSENTIAL HTN: ICD-10-CM

## 2024-07-12 DIAGNOSIS — R42 VERTIGO: Primary | ICD-10-CM

## 2024-07-12 DIAGNOSIS — I49.3 PVCS (PREMATURE VENTRICULAR CONTRACTIONS): ICD-10-CM

## 2024-07-12 DIAGNOSIS — G44.86 CERVICOGENIC HEADACHE: ICD-10-CM

## 2024-07-12 DIAGNOSIS — G43.809 VESTIBULAR MIGRAINE: ICD-10-CM

## 2024-07-12 PROCEDURE — 99214 OFFICE O/P EST MOD 30 MIN: CPT | Performed by: FAMILY MEDICINE

## 2024-07-12 RX ORDER — DILTIAZEM HYDROCHLORIDE 180 MG/1
180 CAPSULE, COATED, EXTENDED RELEASE ORAL DAILY
Qty: 90 CAPSULE | Refills: 1 | Status: SHIPPED | OUTPATIENT
Start: 2024-07-12

## 2024-07-12 RX ORDER — KETOROLAC TROMETHAMINE 10 MG/1
10 TABLET, FILM COATED ORAL EVERY 6 HOURS PRN
Qty: 16 TABLET | Refills: 0 | Status: SHIPPED | OUTPATIENT
Start: 2024-07-12

## 2024-07-12 RX ORDER — METOPROLOL SUCCINATE 25 MG/1
TABLET, EXTENDED RELEASE ORAL
Qty: 135 TABLET | Refills: 2 | Status: SHIPPED | OUTPATIENT
Start: 2024-07-12

## 2024-07-12 NOTE — PROGRESS NOTES
[facility] Progress Note    SUBJECTIVE: Donato Baca 59 year old male is here today for   Chief Complaint   Patient presents with    Medication Request    Test Results       Here to follow up on imaging results and needs refill of medication    MRI of cervical spine did show some narrowed nerve outlets    Has an appointment with neurology next week, to talk about mgiraines and dizziness.    Migrains have become more common    Did see Dr. Bateman, physiatry, for lower back, and will see him in 2 weeks, to discuss neck    Has ongoing dizziness/vertigo    CTA shows stable aortic dilation    Toradol is very helpful, finished last pill 2 weeks ago, 16 tabelts over 6 weeks        PMH  Past Medical History:    Abdominal pain    Occasional    Acute bilateral low back pain with bilateral sciatica    Axillary mass, bilateral    Back pain    Back problem    Bloating    Occasional    Calculus of kidney    Constipation    Occasional    Depression    Diarrhea, unspecified    Occasional    Dizziness    In conjunction with PVCs    Enlarged lymph node    Currently under care    Epididymitis    Essential hypertension    Fatigue    Daily    Flatulence/gas pain/belching    Occasional    Headache disorder    Couple times a week    Heart palpitations    Idiopathic diagnosis    Heartburn    Occasional    Hemorrhoids    High blood pressure    Indigestion    Occasioal    Irregular bowel habits    Kidney stones    Loss of appetite    Occasional    Mass of left thigh    Migraines    Nausea    Occasional    Pain in joints    Pain with bowel movements    Occasional    Pneumonia due to organism    Sleep apnea    Possible- sleep study scheduled 4/19/19    Sleep disturbance    Daily    Stress    Visual impairment    Wears glasses    Since late teens        PSH  Past Surgical History:   Procedure Laterality Date    Colonoscopy      Colonoscopy      Sinus surgery            Social Hx:  No changes    ROS  See HPI    OBJECTIVE:  /80    Pulse 66   Resp 16   Ht 6' (1.829 m)   Wt 268 lb (121.6 kg)   SpO2 98%   BMI 36.35 kg/m²         Labs:          Meds:   Current Outpatient Medications   Medication Sig Dispense Refill    dilTIAZem HCl ER Coated Beads 180 MG Oral Capsule SR 24 Hr Take 1 capsule (180 mg total) by mouth daily. 90 capsule 1    Ketorolac Tromethamine 10 MG Oral Tab Take 1 tablet (10 mg total) by mouth every 6 (six) hours as needed for Pain. 16 tablet 0    metoprolol succinate ER 25 MG Oral Tablet 24 Hr TAKE 1 TABLET BY MOUTH EVERY MORNING AND 1/2 TABLET BY MOUTH AT NIGHT 135 tablet 2    Triamcinolone Acetonide (NASACORT AQ NA) by Nasal route.      Omeprazole Magnesium (PRILOSEC OR) Take 20 mg by mouth.      Omega-3 Fatty Acids (FISH OIL CONCENTRATE OR) Take 1 capsule by mouth every evening.      Lactobacillus (PROBIOTIC ACIDOPHILUS OR) Take 1 tablet by mouth daily with lunch.      Multiple Vitamins-Minerals (MULTI-VITAMIN/MINERALS) Oral Tab Take 1 tablet by mouth daily with lunch.      magnesium oxide 400 MG Oral Tab Take 1 tablet (400 mg total) by mouth every evening. 30 tablet 0         Assessment/Plan  Donato was seen today for medication request and test results.    Diagnoses and all orders for this visit:    PVCs (premature ventricular contractions)  -     dilTIAZem HCl ER Coated Beads 180 MG Oral Capsule SR 24 Hr; Take 1 capsule (180 mg total) by mouth daily.  -     metoprolol succinate ER 25 MG Oral Tablet 24 Hr; TAKE 1 TABLET BY MOUTH EVERY MORNING AND 1/2 TABLET BY MOUTH AT NIGHT    Benign essential HTN  -     dilTIAZem HCl ER Coated Beads 180 MG Oral Capsule SR 24 Hr; Take 1 capsule (180 mg total) by mouth daily.  -     metoprolol succinate ER 25 MG Oral Tablet 24 Hr; TAKE 1 TABLET BY MOUTH EVERY MORNING AND 1/2 TABLET BY MOUTH AT NIGHT    Cervicogenic headache  -     Ketorolac Tromethamine 10 MG Oral Tab; Take 1 tablet (10 mg total) by mouth every 6 (six) hours as needed for Pain.    Vestibular migraine  -     Ketorolac  Tromethamine 10 MG Oral Tab; Take 1 tablet (10 mg total) by mouth every 6 (six) hours as needed for Pain.         Will plan on vestibular therapy, continue follow up neurology.     The 10-year ASCVD risk score (Mary Kay CHATTERJEE, et al., 2019) is: 6.7%    Values used to calculate the score:      Age: 59 years      Sex: Male      Is Non- : No      Diabetic: No      Tobacco smoker: No      Systolic Blood Pressure: 128 mmHg      Is BP treated: Yes      HDL Cholesterol: 60 mg/dL      Total Cholesterol: 200 mg/dL        Total Time spent with patient and coordinating care:  15 minutes.    Follow up: 6 months      Avi Pratt MD

## 2024-07-16 ENCOUNTER — OFFICE VISIT (OUTPATIENT)
Dept: NEUROLOGY | Facility: CLINIC | Age: 59
End: 2024-07-16
Payer: COMMERCIAL

## 2024-07-16 ENCOUNTER — HOSPITAL ENCOUNTER (OUTPATIENT)
Dept: GENERAL RADIOLOGY | Age: 59
Discharge: HOME | End: 2024-07-16
Attending: UROLOGY
Payer: COMMERCIAL

## 2024-07-16 VITALS
DIASTOLIC BLOOD PRESSURE: 80 MMHG | HEIGHT: 72 IN | WEIGHT: 273.19 LBS | RESPIRATION RATE: 16 BRPM | SYSTOLIC BLOOD PRESSURE: 122 MMHG | BODY MASS INDEX: 37 KG/M2 | HEART RATE: 77 BPM

## 2024-07-16 DIAGNOSIS — M54.2 CHRONIC NECK PAIN: ICD-10-CM

## 2024-07-16 DIAGNOSIS — G43.909 EPISODIC MIGRAINE: Primary | ICD-10-CM

## 2024-07-16 DIAGNOSIS — N20.0 RECURRENT KIDNEY STONES: ICD-10-CM

## 2024-07-16 DIAGNOSIS — G43.809 VESTIBULAR MIGRAINE: ICD-10-CM

## 2024-07-16 DIAGNOSIS — G89.29 CHRONIC NECK PAIN: ICD-10-CM

## 2024-07-16 DIAGNOSIS — G43.119 INTRACTABLE MIGRAINE WITH AURA WITHOUT STATUS MIGRAINOSUS: ICD-10-CM

## 2024-07-16 PROCEDURE — 74018 RADEX ABDOMEN 1 VIEW: CPT | Performed by: UROLOGY

## 2024-07-16 PROCEDURE — 99214 OFFICE O/P EST MOD 30 MIN: CPT | Performed by: OTHER

## 2024-07-16 RX ORDER — VENLAFAXINE HYDROCHLORIDE 37.5 MG/1
CAPSULE, EXTENDED RELEASE ORAL
Qty: 40 CAPSULE | Refills: 1 | Status: SHIPPED | OUTPATIENT
Start: 2024-07-16

## 2024-07-16 RX ORDER — SUMATRIPTAN 50 MG/1
TABLET, FILM COATED ORAL
Qty: 9 TABLET | Refills: 1 | Status: SHIPPED | OUTPATIENT
Start: 2024-07-16

## 2024-07-16 NOTE — PROGRESS NOTES
[facility]   Neurology    Donato Baca Patient Status:  No patient class for patient encounter    1965 MRN [id number]   Location [facility], [facility] PCP Avi Pratt MD              HPI:   Donato Baca is a(n) 59 year old male with history of HTN, viral meningitis as a small child, who presents at the request of Avi Pratt MD for evaluation of chronic migraines.  Headaches are throbbing, usually unilateral, worsened with activity and light, and associated with nausea. Sees flickering light on the left, and has left facial and arm numbness. Migraines come in clusters, occurs daily for several days, and can go weeks without on. Also has dizziness with the migraines. Is having daily ocular auras and migraines currently. Has been on amitriptyline for lumbar radiculopathy, but had an increase in PVC's with it. Stress triggers them. Has 2 weeks of migraines 3-4 times a year. Has tried ibuprofen, excedrin, sensitive to caffeine so can't do fiorcet.   Interim  Since last visit, patient reports migraines are more frequent, now monthly, migraine episode will usually last a whole week. Tried toradol tablet from PCP, which would lighten the headache. During the episode he will have vertigo. Vertigo occurs a few times a week. Has neck pain a few times a week. Sumatriptan not helpful.           Meds tried  Propranolol as a child   Cymbalta- fatigue, nightmares  Flexeril- reports palpitations    Pertinent imaging and laboratory work-up:   MRI brain 20  CONCLUSION:       1. No acute intracranial abnormality identified.      2. Minimal chronic microvascular ischemic changes in the cerebral white matter.       MRI cervical and thoracic 2019  Impression   CONCLUSION:    MR imaging of the cervical spine and thoracic spine are within normal limits.  Normal appearance of the spinal cord.  No significant disc disease or evidence of stenosis  identified.  No significant osseous abnormalities.  The paraspinal soft tissues are  also within normal limits.         Past Medical History:  Past Medical History:    Abdominal pain    Occasional    Acute bilateral low back pain with bilateral sciatica    Axillary mass, bilateral    Back pain    Back problem    Bloating    Occasional    Calculus of kidney    Constipation    Occasional    Depression    Diarrhea, unspecified    Occasional    Dizziness    In conjunction with PVCs    Enlarged lymph node    Currently under care    Epididymitis    Essential hypertension    Fatigue    Daily    Flatulence/gas pain/belching    Occasional    Headache disorder    Couple times a week    Heart palpitations    Idiopathic diagnosis    Heartburn    Occasional    Hemorrhoids    High blood pressure    Indigestion    Occasioal    Irregular bowel habits    Kidney stones    Loss of appetite    Occasional    Mass of left thigh    Migraines    Nausea    Occasional    Pain in joints    Pain with bowel movements    Occasional    Pneumonia due to organism    Sleep apnea    Possible- sleep study scheduled 4/19/19    Sleep disturbance    Daily    Stress    Visual impairment    Wears glasses    Since late teens        Past Surgical History:  Past Surgical History:   Procedure Laterality Date    Colonoscopy      Colonoscopy      Sinus surgery           Family History:  family history includes Asthma in his mother and sister; Cancer (age of onset: 62) in his mother; Cancer (age of onset: 76) in his father; Depression in his father and mother; Heart Disorder in his mother; Kidney Disease in his maternal grandmother; Parkensons in his paternal grandfather.    Social History:   reports that he has never smoked. He has never used smokeless tobacco. He reports current alcohol use. He reports that he does not use drugs.    Allergies:  Allergies   Allergen Reactions    Bactrim Ds HIVES    Sulfamethoxazole UNKNOWN     Cerner Allergy Text Annotation:  sulfamethoxazole    Gabapentin RASH and OTHER (SEE COMMENTS)     Tingle        MEDICATIONS:    Current Outpatient Medications:     venlafaxine ER (EFFEXOR XR) 37.5 MG Oral Capsule SR 24 Hr, Week 1-2: take 1 tablet daily (or nightly if sleepy or having other side effects), Week 3 and on: if tolerating, can try increasing to 2 tablets daily, Disp: 40 capsule, Rfl: 1    SUMAtriptan 50 MG Oral Tab, Take 1 tab at start of headache, may repeat 1 more tab in 2 hours, no more than 2 in 24 hours or 3 in 1 week, Disp: 9 tablet, Rfl: 1    dilTIAZem HCl ER Coated Beads 180 MG Oral Capsule SR 24 Hr, Take 1 capsule (180 mg total) by mouth daily., Disp: 90 capsule, Rfl: 1    Ketorolac Tromethamine 10 MG Oral Tab, Take 1 tablet (10 mg total) by mouth every 6 (six) hours as needed for Pain., Disp: 16 tablet, Rfl: 0    metoprolol succinate ER 25 MG Oral Tablet 24 Hr, TAKE 1 TABLET BY MOUTH EVERY MORNING AND 1/2 TABLET BY MOUTH AT NIGHT, Disp: 135 tablet, Rfl: 2    Triamcinolone Acetonide (NASACORT AQ NA), by Nasal route., Disp: , Rfl:     Omeprazole Magnesium (PRILOSEC OR), Take 20 mg by mouth., Disp: , Rfl:     Omega-3 Fatty Acids (FISH OIL CONCENTRATE OR), Take 1 capsule by mouth every evening., Disp: , Rfl:     Multiple Vitamins-Minerals (MULTI-VITAMIN/MINERALS) Oral Tab, Take 1 tablet by mouth daily with lunch., Disp: , Rfl:     magnesium oxide 400 MG Oral Tab, Take 1 tablet (400 mg total) by mouth every evening., Disp: 30 tablet, Rfl: 0    Lactobacillus (PROBIOTIC ACIDOPHILUS OR), Take 1 tablet by mouth daily with lunch., Disp: , Rfl:       Review of Systems:   A comprehensive 10 point review of systems was completed.     Pertinent positives and negatives noted in the HPI.         PHYSICAL EXAM:   Neurologic Exam  Vitals  Vitals:    07/16/24 0907   BP: 122/80   Pulse: 77   Resp: 16       General Appearance: Patient is a 59 year old male in no acute distress  Cardiac: Normal rate & regular rhythm  Skin: There are no rashes or  other skin lesions.  Musculoskeletal: There is no scoliosis, or joint deformities  Neurologic examination:  Mental status: Patient is alert, attentive, and oriented x 3. Language is coherent and fluent without aphasia. Memory, comprehension and ability to follow commands were intact.   Cranial nerves II-XII: Optic discs were sharp. Pupils were round and reacted to light. Extraocular movements were full. There was no face, jaw, palate or tongue weakness or atrophy. Facial sensation was normal. Hearing was grossly intact. Shoulder shrug was normal.   Motor exam revealed normal muscle bulk and tone. No atrophy or fasciculations. Manual muscle testing revealed MRC grade 5/5 strength throughout including proximal and distal muscles of the arms and legs.  Deep tendon reflexes were 2 at the biceps, brachioradialis, triceps, knee jerk, and ankle jerk. Plantar responses were flexor bilaterally.   Sensory exam revealed normal light touch perception. Vibratory perception and proprioception were intact at the toes. Pinprick and temperature were normal. Romberg sign was absent.  Complex motor skills revealed normal coordination. Finger-nose-finger intact.   Gait was narrow and stable, was able to walk on heels, toes and tandem without any difficulty.     ASSESSMENT/ACTIVE PROBLEM LIST:     Encounter Diagnoses   Name Primary?    Episodic migraine Yes    Intractable migraine with aura without status migrainosus     Vestibular migraine     Chronic neck pain        Discussion/Plan:  Severe episodic vestibular migraines with aura, last several days- worsening, increase in frequency  Cannot have medications with caffeine, due to PVC's  Start Effexor for migraines and depression. Did not tolerate Cymbalta when given in the past for something else. Verapamil will avoid as patient is on diltiazem and beta blocker.  If Effexor not effective or tolerated, would recommend CGRP antagonist, has tried propranolol as a child as well for  migraines  Trial sumatriptan as abortive 50mg from 25mg, and if not effective, go back to toradol and take with benadryl, for abortive treatment  Start PT for chronic neck pain    Requested Prescriptions     Signed Prescriptions Disp Refills    venlafaxine ER (EFFEXOR XR) 37.5 MG Oral Capsule SR 24 Hr 40 capsule 1     Sig: Week 1-2: take 1 tablet daily (or nightly if sleepy or having other side effects), Week 3 and on: if tolerating, can try increasing to 2 tablets daily    SUMAtriptan 50 MG Oral Tab 9 tablet 1     Sig: Take 1 tab at start of headache, may repeat 1 more tab in 2 hours, no more than 2 in 24 hours or 3 in 1 week          We discussed in depth regarding the diagnosis, prognosis, treatment. The patient was given ample opportunity to ask questions. All questions and concerns were addressed.     Ebony Cintron DO  Neuromuscular and General Neurology  [facility]

## 2024-07-16 NOTE — PATIENT INSTRUCTIONS
Refill policies:    Allow 2-3 business days for refills; controlled substances may take longer.  Contact your pharmacy at least 5 days prior to running out of medication and have them send an electronic request or submit request through the “request refill” option in your [employer] account.  Refills are not addressed on weekends; covering physicians do not authorize routine medications on weekends.  No narcotics or controlled substances are refilled after noon on Fridays or by on call physicians.  By law, narcotics must be electronically prescribed.  A 30 day supply with no refills is the maximum allowed.  If your prescription is due for a refill, you may be due for a follow up appointment.  To best provide you care, patients receiving routine medications need to be seen at least once a year.  Patients receiving narcotic/controlled substance medications need to be seen at least once every 3 months.  In the event that your preferred pharmacy does not have the requested medication in stock (e.g. Backordered), it is your responsibility to find another pharmacy that has the requested medication available.  We will gladly send a new prescription to that pharmacy at your request.    Scheduling Tests:    If your physician has ordered radiology tests such as MRI or CT scans, please contact Central Scheduling at [phone] right away to schedule the test.  Once scheduled, the [facility] Centralized Referral Team will work with your insurance carrier to obtain pre-certification or prior authorization.  Depending on your insurance carrier, approval may take 3-10 days.  It is highly recommended patients assure they have received an authorization before having a test performed.  If test is done without insurance authorization, patient may be responsible for the entire amount billed.      Precertification and Prior Authorizations:  If your physician has recommended that you have a procedure or additional testing performed the [facility]  Centralized Referral Team will contact your insurance carrier to obtain pre-certification or prior authorization.    You are strongly encouraged to contact your insurance carrier to verify that your procedure/test has been approved and is a COVERED benefit.  Although the [facility] Centralized Referral Team does its due diligence, the insurance carrier gives the disclaimer that \"Although the procedure is authorized, this does not guarantee payment.\"    Ultimately the patient is responsible for payment.   Thank you for your understanding in this matter.  Paperwork Completion:  If you require FMLA or disability paperwork for your recovery, please make sure to either drop it off or have it faxed to our office at [phone]. Be sure the form has your name and date of birth on it.  The form will be faxed to our Forms Department and they will complete it for you.  There is a 25$ fee for all forms that need to be filled out.  Please be aware there is a 10-14 day turnaround time.  You will need to sign a release of information (AIDEN) form if your paperwork does not come with one.  You may call the Forms Department with any questions at [phone].  Their fax number is [phone].          Send message on [employer] by Aug 5th with update on dizziness and migraines, medication

## 2024-07-16 NOTE — PROGRESS NOTES
Pt stated he is following up on migraines and dizziness  PT stated the dizziness has been going on for a while. He does take a medication for the migraines and it does help.

## 2024-07-25 ENCOUNTER — TELEPHONE (OUTPATIENT)
Dept: PHYSICAL MEDICINE AND REHAB | Facility: CLINIC | Age: 59
End: 2024-07-25

## 2024-07-25 ENCOUNTER — OFFICE VISIT (OUTPATIENT)
Dept: PHYSICAL MEDICINE AND REHAB | Facility: CLINIC | Age: 59
End: 2024-07-25
Payer: COMMERCIAL

## 2024-07-25 VITALS — BODY MASS INDEX: 36.98 KG/M2 | HEIGHT: 72 IN | WEIGHT: 273 LBS

## 2024-07-25 DIAGNOSIS — M54.12 CERVICAL RADICULOPATHY: Primary | ICD-10-CM

## 2024-07-25 PROCEDURE — 99214 OFFICE O/P EST MOD 30 MIN: CPT | Performed by: PHYSICAL MEDICINE & REHABILITATION

## 2024-07-25 NOTE — TELEPHONE ENCOUNTER
Initiated authorization for C7/T1 ILESI With fluoroscopic guidance, local anesthesia. CPT/HCPCS 06461, dx:M54.12 to be done at [facility] with [facility]    Status: Approved - no auth required

## 2024-07-25 NOTE — PROGRESS NOTES
RETURN PATIENT VISIT    CHIEF COMPLAINT  Axial neck pain with left upper extremity radicular pain    INTERVAL HISTORY  Donato Baca is a 59 year old who was last seen in clinic on 10/7/2022 for axial low back pain, it has been a while since have seen this gentleman but he comes in today with left-sided neck left shoulder blade and left shoulder and left arm aching pain.  He endorses numbness and tingling down the left arm and left hand.  He denies weakness.  Currently rates his level pain 2/10.  He also has some migraines associated with his pain.  Currently uses ibuprofen as needed, Toradol or sumatriptan as needed.  MRI of the cervical spine has been completed.    He has migraines which he feels may be associated with his neck, he has had imaging of his neck.     He states that certain neck positions will exacerbate radicular pain emanating down the left upper extremity reaching the hand involving most all of his fingers.  Numbness and tingling in the palm.  This is very bothersome for this gentleman he denies red flags however denies any progressive weakness, progressive numbness, bowel or bladder dysfunction, difficulty with fine motor tasks or gait instability.    REVIEW OF SYSTEMS  Review of systems was completed with the patient today as pertinent to today's visit    PHYSICAL EXAMINATION  CONSTITUTIONAL: Well-appearing, in no apparent distress  EYES: No scleral icterus or conjunctival hemorrhage  CARDIOVASCULAR: Skin warm and well-perfused, no peripheral edema  RESPIRATORY: Breathing unlabored without accessory muscle use  PSYCHIATRIC: Alert, cooperative, appropriate mood and affect  SKIN: No lesions or rashes on exposed skin  MUSCULOSKELETAL: Well-maintained strength in the upper extremities.  Reflexes are intact.  He has good range of motion of the axial neck however he has pain with extension as well as rotation to the left   NEUROLOGIC: Spurling's maneuver is positive on the left with reproduction of  radicular pain into the left upper extremity    REVIEW OF PRIOR X-RAYS/STUDIES  Independently reviewed the MRI of the cervical spine dated 6/27/2024 which reveals left paracentral osteophytes at C5-6 and C6-7 which resulted in moderate left foraminal stenosis at both of those levels may be contributing to this gentlemen's pain complaints.    IMPRESSION/DIAGNOSIS  1.    Encounter Diagnosis   Name Primary?    Cervical radiculopathy Yes         TREATMENT/PLAN  High suspicion for cervical radiculopathy with compressive pathology noted on MRI of the cervical spine.  We discussed interventional options with the patient's tried gabapentin in the past.  He is interested.  We will schedule the patient for a C7-T1 interlaminar epidural steroid injection fluoroscopic guidance and local anesthesia.    Additionally have adjusted his physical therapy prescription to include more axial neck stabilization and postural retraining.    He will follow-up with me for the above injection and then 2 weeks after.    Education was provided regarding the above impression/diagnosis and treatment options/plan were discussed.  All questions were answered during today's visit.  Patient will contact clinic if any other questions or concerns.          Eugenio Bateman DO  Interventional Spine and Sports Medicine Specialist   Physical Medicine and Rehabilitation  [address] [address]    [address] [place] [address]

## 2024-07-26 ENCOUNTER — PATIENT MESSAGE (OUTPATIENT)
Dept: NEUROLOGY | Facility: CLINIC | Age: 59
End: 2024-07-26

## 2024-07-26 DIAGNOSIS — G43.119 INTRACTABLE MIGRAINE WITH AURA WITHOUT STATUS MIGRAINOSUS: Primary | ICD-10-CM

## 2024-07-26 NOTE — TELEPHONE ENCOUNTER
From: Donato Baca  To: Pratima Cintron  Sent: 7/26/2024 11:23 AM CDT  Subject: Effexor prescription     Following up regarding the Effexor as discussed   It has affected my sleek and caused nausea so I stopped taking  Let me know if you believe we should try another medication   Thanks

## 2024-07-29 NOTE — TELEPHONE ENCOUNTER
Patient has been scheduled for C7-T1 interlaminar epidural steroid injection on 8/6/24 at the [facility] with Dr. Bateman.   Anesthesia type:  Local  Please note: The [facility] will call the business day prior to discuss the exact time/arrival and additional instructions for your appointment.  Patient was advised that if he/she does receive the covid vaccine it needs to be at least 2 weeks before or after the injection.  Medications and allergies reviewed.  Educated to hold NSAIDS (Aleve, Ibuprofen, Motrin, Advil) and anti-inflammatories (Meloxicam, Naproxen, Diclofenac, Celebrex) and for cervical injections must hold Multi-Vitamins, Vitamin E, Fish Oil/Omega-3.  If patient is receiving MAC/IVCS, weight loss oral/injectable medications will need to be held for 7 days prior to injection.  Patient informed to fast 8 hours prior to procedure and 10-12 hours prior to procedure with IVCS/MAC if patient is on a weight loss medication.   If on blood thinner, clearance has been received and approved to hold this medication by provider.   Patient informed of [facility]'s  policy:  he/she will need a  to and from procedure and must be on site for their entirety of their visit, if their ride is unable to the procedure will be cancelled.   [facility] is located in the [facility] 1st floor [address], [place] [address].   may park in the yellow/purple parking lot.  Patient verbalized understanding and agrees with plan.  Scheduled in Epic: Yes  Scheduled in Surgical Case: Yes  Follow up appointment made: NOV: Visit date not found

## 2024-08-02 RX ORDER — ERENUMAB-AOOE 70 MG/ML
70 INJECTION SUBCUTANEOUS
Qty: 1 ML | Refills: 2 | Status: SHIPPED | OUTPATIENT
Start: 2024-08-02 | End: 2024-10-31

## 2024-08-07 RX ORDER — VENLAFAXINE HYDROCHLORIDE 37.5 MG/1
CAPSULE, EXTENDED RELEASE ORAL
Qty: 40 CAPSULE | Refills: 1
Start: 2024-08-07

## 2024-08-07 NOTE — TELEPHONE ENCOUNTER
Received request for 90 day supply of Venlafaxine- denied as patient is no longer taking Venlafaxine.

## 2024-09-06 ENCOUNTER — OFFICE VISIT (OUTPATIENT)
Dept: FAMILY MEDICINE CLINIC | Facility: CLINIC | Age: 59
End: 2024-09-06
Payer: COMMERCIAL

## 2024-09-06 ENCOUNTER — LAB ENCOUNTER (OUTPATIENT)
Dept: LAB | Age: 59
End: 2024-09-06
Attending: FAMILY MEDICINE
Payer: COMMERCIAL

## 2024-09-06 VITALS
HEIGHT: 72 IN | OXYGEN SATURATION: 98 % | RESPIRATION RATE: 16 BRPM | HEART RATE: 76 BPM | DIASTOLIC BLOOD PRESSURE: 84 MMHG | WEIGHT: 272 LBS | SYSTOLIC BLOOD PRESSURE: 126 MMHG | BODY MASS INDEX: 36.84 KG/M2

## 2024-09-06 DIAGNOSIS — G44.86 CERVICOGENIC HEADACHE: ICD-10-CM

## 2024-09-06 DIAGNOSIS — R22.32 MASS OF LEFT AXILLA: ICD-10-CM

## 2024-09-06 DIAGNOSIS — R22.32 MASS OF LEFT AXILLA: Primary | ICD-10-CM

## 2024-09-06 DIAGNOSIS — G43.809 VESTIBULAR MIGRAINE: ICD-10-CM

## 2024-09-06 LAB
BASOPHILS # BLD AUTO: 0.03 X10(3) UL (ref 0–0.2)
BASOPHILS NFR BLD AUTO: 0.4 %
EOSINOPHIL # BLD AUTO: 0.06 X10(3) UL (ref 0–0.7)
EOSINOPHIL NFR BLD AUTO: 0.7 %
ERYTHROCYTE [DISTWIDTH] IN BLOOD BY AUTOMATED COUNT: 12.3 %
HCT VFR BLD AUTO: 45.5 %
HGB BLD-MCNC: 15.4 G/DL
IMM GRANULOCYTES # BLD AUTO: 0.02 X10(3) UL (ref 0–1)
IMM GRANULOCYTES NFR BLD: 0.2 %
LYMPHOCYTES # BLD AUTO: 2.71 X10(3) UL (ref 1–4)
LYMPHOCYTES NFR BLD AUTO: 32.4 %
MCH RBC QN AUTO: 31.3 PG (ref 26–34)
MCHC RBC AUTO-ENTMCNC: 33.8 G/DL (ref 31–37)
MCV RBC AUTO: 92.5 FL
MONOCYTES # BLD AUTO: 0.68 X10(3) UL (ref 0.1–1)
MONOCYTES NFR BLD AUTO: 8.1 %
NEUTROPHILS # BLD AUTO: 4.87 X10 (3) UL (ref 1.5–7.7)
NEUTROPHILS # BLD AUTO: 4.87 X10(3) UL (ref 1.5–7.7)
NEUTROPHILS NFR BLD AUTO: 58.2 %
PLATELET # BLD AUTO: 211 10(3)UL (ref 150–450)
RBC # BLD AUTO: 4.92 X10(6)UL
WBC # BLD AUTO: 8.4 X10(3) UL (ref 4–11)

## 2024-09-06 PROCEDURE — 85025 COMPLETE CBC W/AUTO DIFF WBC: CPT

## 2024-09-06 PROCEDURE — 36415 COLL VENOUS BLD VENIPUNCTURE: CPT

## 2024-09-06 PROCEDURE — 99213 OFFICE O/P EST LOW 20 MIN: CPT | Performed by: FAMILY MEDICINE

## 2024-09-06 RX ORDER — KETOROLAC TROMETHAMINE 10 MG/1
10 TABLET, FILM COATED ORAL EVERY 6 HOURS PRN
Qty: 16 TABLET | Refills: 0 | Status: SHIPPED | OUTPATIENT
Start: 2024-09-06

## 2024-09-06 NOTE — PROGRESS NOTES
[facility] Progress Note    SUBJECTIVE: Donato Baca 59 year old male is here today for   Chief Complaint   Patient presents with    Bump     In left sara pit and in the groin area B/L. Bumps were noticed a week ago. There had been tenderness in the areas for 4 weeks       Donato has for the past 4 weeks or so, noted some tenderness in his armpit, and then last week felt a bump and then potentially in groin area.    Having a red rash in the groin.    Sees neurology    Had shot in his neck and doing mmuch better since that this was with Dr. Bateman with PM&R    Was given one a month medication aimovig. Sicne the injection in neck has had two migraines, lasting only hours, much improved. So hasn't stopped the aimovig yet due to improvement.        PMH  Past Medical History:    Abdominal pain    Occasional    Acute bilateral low back pain with bilateral sciatica    Axillary mass, bilateral    Back pain    Back problem    Bloating    Occasional    Calculus of kidney    Constipation    Occasional    Depression    Diarrhea, unspecified    Occasional    Dizziness    In conjunction with PVCs    Enlarged lymph node    Currently under care    Epididymitis    Essential hypertension    Fatigue    Daily    Flatulence/gas pain/belching    Occasional    Headache disorder    Couple times a week    Heart palpitations    Idiopathic diagnosis    Heartburn    Occasional    Hemorrhoids    High blood pressure    Indigestion    Occasioal    Irregular bowel habits    Kidney stones    Loss of appetite    Occasional    Mass of left thigh    Migraines    Nausea    Occasional    Pain in joints    Pain with bowel movements    Occasional    Pneumonia due to organism    Sleep apnea    Possible- sleep study scheduled 4/19/19    Sleep disturbance    Daily    Stress    Visual impairment    Wears glasses    Since late teens        PSH  Past Surgical History:   Procedure Laterality Date    Colonoscopy      Colonoscopy      Sinus surgery             Social Hx:  No changes    ROS  See HPI    OBJECTIVE:  /84   Pulse 76   Resp 16   Ht 6' (1.829 m)   Wt 272 lb (123.4 kg)   SpO2 98%   BMI 36.89 kg/m²     Exam  Ext: left axilla mobile lump, feels 2-3 cm deep in subcutaneous      Labs:          Meds:   Current Outpatient Medications   Medication Sig Dispense Refill    erenumab-aooe (AIMOVIG) 70 MG/ML Subcutaneous Inject 1 mL (70 mg total) into the skin every 30 (thirty) days. 1 mL 2    SUMAtriptan 50 MG Oral Tab Take 1 tab at start of headache, may repeat 1 more tab in 2 hours, no more than 2 in 24 hours or 3 in 1 week 9 tablet 1    dilTIAZem HCl ER Coated Beads 180 MG Oral Capsule SR 24 Hr Take 1 capsule (180 mg total) by mouth daily. 90 capsule 1    Ketorolac Tromethamine 10 MG Oral Tab Take 1 tablet (10 mg total) by mouth every 6 (six) hours as needed for Pain. 16 tablet 0    metoprolol succinate ER 25 MG Oral Tablet 24 Hr TAKE 1 TABLET BY MOUTH EVERY MORNING AND 1/2 TABLET BY MOUTH AT NIGHT 135 tablet 2    Triamcinolone Acetonide (NASACORT AQ NA) by Nasal route.      Omeprazole Magnesium (PRILOSEC OR) Take 20 mg by mouth.      Omega-3 Fatty Acids (FISH OIL CONCENTRATE OR) Take 1 capsule by mouth every evening.      Lactobacillus (PROBIOTIC ACIDOPHILUS OR) Take 1 tablet by mouth daily with lunch.      Multiple Vitamins-Minerals (MULTI-VITAMIN/MINERALS) Oral Tab Take 1 tablet by mouth daily with lunch.      magnesium oxide 400 MG Oral Tab Take 1 tablet (400 mg total) by mouth every evening. 30 tablet 0         Assessment/Plan  Donato was seen today for bump.    Diagnoses and all orders for this visit:    Mass of left axilla  -     US AXILLARY LEFT (CPT=76882); Future  -     CBC W Differential W Platelet [E]; Future         Will get US to evaluate, lumps on thighs less concerning, doesn't feel like a lymph node.    CBC to evaluate for possible infection raised WBC     Has seen substantial improvement with migraines/headaches    Total Time spent with  patient and coordinating care:  15 minutes.    Follow up: as needed      Avi Pratt MD

## 2024-09-15 DIAGNOSIS — G43.119 INTRACTABLE MIGRAINE WITH AURA WITHOUT STATUS MIGRAINOSUS: Primary | ICD-10-CM

## 2024-09-16 RX ORDER — SUMATRIPTAN 50 MG/1
TABLET, FILM COATED ORAL
Qty: 9 TABLET | Refills: 1 | Status: SHIPPED | OUTPATIENT
Start: 2024-09-16

## 2024-09-16 NOTE — TELEPHONE ENCOUNTER
Medication: SUMATRIPTAN 50 MG Oral Tab      Date of last refill: 07/16/2024 (#9/1)  [place]     Last office visit: 07/16/2024  Due back to clinic per last office note:  Around 01/16/2025  Date next office visit scheduled:    Future Appointments   Date Time Provider Department Center   12/13/2024  7:30 AM Wilver Olmstead MD [facility]   1/8/2025  2:35 PM Pratima Cintron DO [facility] [place]           Last OV note recommendation:    ASSESSMENT/ACTIVE PROBLEM LIST:           Encounter Diagnoses   Name Primary?    Episodic migraine Yes    Intractable migraine with aura without status migrainosus      Vestibular migraine      Chronic neck pain           Discussion/Plan:  Severe episodic vestibular migraines with aura, last several days- worsening, increase in frequency  Cannot have medications with caffeine, due to PVC's  Start Effexor for migraines and depression. Did not tolerate Cymbalta when given in the past for something else. Verapamil will avoid as patient is on diltiazem and beta blocker.  If Effexor not effective or tolerated, would recommend CGRP antagonist, has tried propranolol as a child as well for migraines  Trial sumatriptan as abortive 50mg from 25mg, and if not effective, go back to toradol and take with benadryl, for abortive treatment  Start PT for chronic neck pain

## 2024-10-05 DIAGNOSIS — I49.3 PVCS (PREMATURE VENTRICULAR CONTRACTIONS): ICD-10-CM

## 2024-10-05 DIAGNOSIS — I10 BENIGN ESSENTIAL HTN: ICD-10-CM

## 2024-10-07 RX ORDER — DILTIAZEM HYDROCHLORIDE 180 MG/1
180 CAPSULE, COATED, EXTENDED RELEASE ORAL DAILY
Qty: 90 CAPSULE | Refills: 1 | Status: SHIPPED | OUTPATIENT
Start: 2024-10-07

## 2024-10-09 ENCOUNTER — HOSPITAL ENCOUNTER (OUTPATIENT)
Dept: ULTRASOUND IMAGING | Facility: HOSPITAL | Age: 59
Discharge: HOME | End: 2024-10-09
Attending: FAMILY MEDICINE
Payer: COMMERCIAL

## 2024-10-09 DIAGNOSIS — R22.32 MASS OF LEFT AXILLA: ICD-10-CM

## 2024-10-09 PROCEDURE — 76882 US LMTD JT/FCL EVL NVASC XTR: CPT | Performed by: FAMILY MEDICINE

## 2024-10-10 ENCOUNTER — PATIENT MESSAGE (OUTPATIENT)
Dept: FAMILY MEDICINE CLINIC | Facility: CLINIC | Age: 59
End: 2024-10-10

## 2024-10-10 DIAGNOSIS — R59.0 AXILLARY LYMPHADENOPATHY: Primary | ICD-10-CM

## 2024-10-11 NOTE — TELEPHONE ENCOUNTER
Lets start with a chest xray due to presence of lymph nodes bilaterally    Then follow up  Avi Pratt MD

## 2024-10-14 ENCOUNTER — HOSPITAL ENCOUNTER (OUTPATIENT)
Dept: GENERAL RADIOLOGY | Age: 59
Discharge: HOME | End: 2024-10-14
Attending: FAMILY MEDICINE
Payer: COMMERCIAL

## 2024-10-14 DIAGNOSIS — R59.0 AXILLARY LYMPHADENOPATHY: ICD-10-CM

## 2024-10-14 PROCEDURE — 71046 X-RAY EXAM CHEST 2 VIEWS: CPT | Performed by: FAMILY MEDICINE

## 2024-10-23 ENCOUNTER — ORDER TRANSCRIPTION (OUTPATIENT)
Dept: ADMINISTRATIVE | Facility: HOSPITAL | Age: 59
End: 2024-10-23

## 2024-10-23 ENCOUNTER — LAB ENCOUNTER (OUTPATIENT)
Dept: LAB | Age: 59
End: 2024-10-23
Attending: FAMILY MEDICINE
Payer: COMMERCIAL

## 2024-10-23 ENCOUNTER — OFFICE VISIT (OUTPATIENT)
Dept: FAMILY MEDICINE CLINIC | Facility: CLINIC | Age: 59
End: 2024-10-23
Payer: COMMERCIAL

## 2024-10-23 VITALS
RESPIRATION RATE: 18 BRPM | OXYGEN SATURATION: 96 % | WEIGHT: 278 LBS | DIASTOLIC BLOOD PRESSURE: 74 MMHG | BODY MASS INDEX: 37.65 KG/M2 | SYSTOLIC BLOOD PRESSURE: 116 MMHG | HEIGHT: 72 IN | HEART RATE: 75 BPM

## 2024-10-23 DIAGNOSIS — R59.1 LYMPHADENOPATHY: Primary | ICD-10-CM

## 2024-10-23 DIAGNOSIS — R59.0 AXILLARY LYMPHADENOPATHY: ICD-10-CM

## 2024-10-23 DIAGNOSIS — R59.0 AXILLARY LYMPHADENOPATHY: Primary | ICD-10-CM

## 2024-10-23 DIAGNOSIS — R59.9 ENLARGED LYMPH NODES: ICD-10-CM

## 2024-10-23 DIAGNOSIS — R59.1 LYMPHADENOPATHY: ICD-10-CM

## 2024-10-23 DIAGNOSIS — R59.9 ENLARGED LYMPH NODE: ICD-10-CM

## 2024-10-23 LAB
ALBUMIN SERPL-MCNC: 4.9 G/DL (ref 3.2–4.8)
ALBUMIN/GLOB SERPL: 1.7 {RATIO} (ref 1–2)
ALP LIVER SERPL-CCNC: 61 U/L
ALT SERPL-CCNC: 31 U/L
ANION GAP SERPL CALC-SCNC: 6 MMOL/L (ref 0–18)
AST SERPL-CCNC: 27 U/L (ref ?–34)
BILIRUB SERPL-MCNC: 0.8 MG/DL (ref 0.3–1.2)
BUN BLD-MCNC: 16 MG/DL (ref 9–23)
CALCIUM BLD-MCNC: 10.1 MG/DL (ref 8.7–10.4)
CHLORIDE SERPL-SCNC: 105 MMOL/L (ref 98–112)
CO2 SERPL-SCNC: 28 MMOL/L (ref 21–32)
CREAT BLD-MCNC: 0.86 MG/DL
CRP SERPL-MCNC: <0.4 MG/DL (ref ?–0.5)
EGFRCR SERPLBLD CKD-EPI 2021: 101 ML/MIN/1.73M2 (ref 60–?)
FASTING STATUS PATIENT QL REPORTED: NO
GLOBULIN PLAS-MCNC: 2.9 G/DL (ref 2–3.5)
GLUCOSE BLD-MCNC: 102 MG/DL (ref 70–99)
OSMOLALITY SERPL CALC.SUM OF ELEC: 289 MOSM/KG (ref 275–295)
POTASSIUM SERPL-SCNC: 4.2 MMOL/L (ref 3.5–5.1)
PROT SERPL-MCNC: 7.8 G/DL (ref 5.7–8.2)
SODIUM SERPL-SCNC: 139 MMOL/L (ref 136–145)

## 2024-10-23 PROCEDURE — 86140 C-REACTIVE PROTEIN: CPT

## 2024-10-23 PROCEDURE — 86038 ANTINUCLEAR ANTIBODIES: CPT

## 2024-10-23 PROCEDURE — 86225 DNA ANTIBODY NATIVE: CPT

## 2024-10-23 PROCEDURE — 99213 OFFICE O/P EST LOW 20 MIN: CPT | Performed by: FAMILY MEDICINE

## 2024-10-23 PROCEDURE — 36415 COLL VENOUS BLD VENIPUNCTURE: CPT

## 2024-10-23 PROCEDURE — 80053 COMPREHEN METABOLIC PANEL: CPT

## 2024-10-23 NOTE — PROGRESS NOTES
[facility] Progress Note    SUBJECTIVE: Donato Baca 59 year old male is here today for   Chief Complaint   Patient presents with    Lymph Node     Pt reports swollen lymph nodes in armpits for three months. Pt has hx of lymphoma on mother's side.       He is feeling lymph nodes on both sides.    His mom had lymphoma, and started with normal work up, other than swollen lymph nodes    PMH  Past Medical History:    Abdominal pain    Occasional    Acute bilateral low back pain with bilateral sciatica    Axillary mass, bilateral    Back pain    Back problem    Bloating    Occasional    Calculus of kidney    Constipation    Occasional    Depression    Diarrhea, unspecified    Occasional    Dizziness    In conjunction with PVCs    Enlarged lymph node    Currently under care    Epididymitis    Essential hypertension    Fatigue    Daily    Flatulence/gas pain/belching    Occasional    Headache disorder    Couple times a week    Heart palpitations    Idiopathic diagnosis    Heartburn    Occasional    Hemorrhoids    High blood pressure    Indigestion    Occasioal    Irregular bowel habits    Kidney stones    Loss of appetite    Occasional    Mass of left thigh    Migraines    Nausea    Occasional    Pain in joints    Pain with bowel movements    Occasional    Pneumonia due to organism    Sleep apnea    Possible- sleep study scheduled 4/19/19    Sleep disturbance    Daily    Stress    Visual impairment    Wears glasses    Since late teens        PSH  Past Surgical History:   Procedure Laterality Date    Colonoscopy      Colonoscopy      Sinus surgery            Social Hx:  No changes    ROS  See HPI    OBJECTIVE:  /74   Pulse 75   Resp 18   Ht 6' (1.829 m)   Wt 278 lb (126.1 kg)   SpO2 96%   BMI 37.70 kg/m²     Exam  Skin: palpable lymph nodes x 2, one large on left axilla      Labs:          Meds:   Current Outpatient Medications   Medication Sig Dispense Refill    DILTIAZEM HCL ER COATED BEADS 180 MG  Oral Capsule SR 24 Hr TAKE 1 CAPSULE BY MOUTH EVERY DAY 90 capsule 1    SUMATRIPTAN 50 MG Oral Tab TAKE 1 TAB AT START OF HEADACHE, MAY REPEAT 1 MORE TAB IN 2 HOURS, NO MORE THAN 2 IN 24 HOURS OR 3 IN 1 WEEK 9 tablet 1    Ketorolac Tromethamine 10 MG Oral Tab Take 1 tablet (10 mg total) by mouth every 6 (six) hours as needed for Pain. 16 tablet 0    metoprolol succinate ER 25 MG Oral Tablet 24 Hr TAKE 1 TABLET BY MOUTH EVERY MORNING AND 1/2 TABLET BY MOUTH AT NIGHT 135 tablet 2    Triamcinolone Acetonide (NASACORT AQ NA) by Nasal route.      Omeprazole Magnesium (PRILOSEC OR) Take 20 mg by mouth.      Omega-3 Fatty Acids (FISH OIL CONCENTRATE OR) Take 1 capsule by mouth every evening.      Lactobacillus (PROBIOTIC ACIDOPHILUS OR) Take 1 tablet by mouth daily with lunch.      Multiple Vitamins-Minerals (MULTI-VITAMIN/MINERALS) Oral Tab Take 1 tablet by mouth daily with lunch.      magnesium oxide 400 MG Oral Tab Take 1 tablet (400 mg total) by mouth every evening. 30 tablet 0    erenumab-aooe (AIMOVIG) 70 MG/ML Subcutaneous Inject 1 mL (70 mg total) into the skin every 30 (thirty) days. 1 mL 2         Assessment/Plan  Donato was seen today for lymph node.    Diagnoses and all orders for this visit:    Lymphadenopathy  -     Comp Metabolic Panel (14) [E]; Future  -     C-Reactive Protein [E]; Future  -     Connective Tissue Disease (MARSHA) Screen, Reflex Specific Antibody; Future    Axillary lymphadenopathy  -     US BIOPSY CORE LYMPH NODE AXILLARY LEFT (CPT=76942/48403); Future    Enlarged lymph node  -     US BIOPSY CORE LYMPH NODE AXILLARY LEFT (CPT=76942/62839); Future         Plan on biopsy due to persistent enlarging lymph node         Total Time spent with patient and coordinating care:  25 minutes.    Follow up: after rsults      Avi Pratt MD

## 2024-10-24 LAB
DSDNA IGG SERPL IA-ACNC: 1 IU/ML
ENA AB SER QL IA: <0.09 UG/L
ENA AB SER QL IA: NEGATIVE

## 2024-11-05 ENCOUNTER — HOSPITAL ENCOUNTER (OUTPATIENT)
Dept: ULTRASOUND IMAGING | Facility: HOSPITAL | Age: 59
Discharge: HOME | End: 2024-11-05
Attending: FAMILY MEDICINE
Payer: COMMERCIAL

## 2024-11-05 ENCOUNTER — NURSE ONLY (OUTPATIENT)
Dept: LAB | Facility: HOSPITAL | Age: 59
End: 2024-11-05
Attending: FAMILY MEDICINE
Payer: COMMERCIAL

## 2024-11-05 DIAGNOSIS — R59.0 AXILLARY LYMPHADENOPATHY: ICD-10-CM

## 2024-11-05 DIAGNOSIS — R59.9 ENLARGED LYMPH NODE: ICD-10-CM

## 2024-11-05 PROCEDURE — 76942 ECHO GUIDE FOR BIOPSY: CPT | Performed by: FAMILY MEDICINE

## 2024-11-05 PROCEDURE — 88185 FLOWCYTOMETRY/TC ADD-ON: CPT | Performed by: FAMILY MEDICINE

## 2024-11-05 PROCEDURE — 88341 IMHCHEM/IMCYTCHM EA ADD ANTB: CPT | Performed by: FAMILY MEDICINE

## 2024-11-05 PROCEDURE — 88184 FLOWCYTOMETRY/ TC 1 MARKER: CPT | Performed by: FAMILY MEDICINE

## 2024-11-05 PROCEDURE — 88307 TISSUE EXAM BY PATHOLOGIST: CPT | Performed by: FAMILY MEDICINE

## 2024-11-05 PROCEDURE — 88342 IMHCHEM/IMCYTCHM 1ST ANTB: CPT | Performed by: FAMILY MEDICINE

## 2024-11-05 PROCEDURE — 38505 NEEDLE BIOPSY LYMPH NODES: CPT | Performed by: FAMILY MEDICINE

## 2024-11-05 NOTE — PROCEDURES
[facility]   part of [facility]  Procedure Note    Donato Baca Patient Status:  Outpatient    1965 MRN [id number]   Location [facility] ULTRASOUND Attending Avi Pratt MD    Day # 0 PCP Avi Pratt MD     Procedure: US guided lymph node biopsy L axilla    Pre-Procedure Diagnosis:  Lymph node    Post-Procedure Diagnosis: Same    Anesthesia:  Local    Findings:  8 x 20g core of L axillary node    Specimens: As above    Blood Loss:  Minimal    Tourniquet Time: None  Complications:  None  Drains:  None    Secondary Diagnosis:  None    Khadar Morris MD  2024

## 2024-11-07 LAB
CD10 CELLS NFR SPEC: <1 %
CD10/CD19: <1 %
CD19 CELLS NFR SPEC: 18 %
CD19+/CD200+: 3 %
CD2 CELLS NFR SPEC: 78 %
CD20 CELLS NFR SPEC: 17 %
CD200 CELLS: 6 %
CD3 CELLS NFR SPEC: 72 %
CD3+/TCRGD+: 4 %
CD3+CD4+ CELLS NFR SPEC: 54 %
CD3+CD4+ CELLS/CD3+CD8+ CLL SPEC: 3.4
CD3+CD8+ CELLS NFR SPEC: 16 %
CD3-/CD56+: 7 %
CD34 CELLS NFR SPEC: <1 %
CD38 CELLS NFR SPEC: 2 %
CD38+/CD19+: <1 %
CD45 CELLS NFR SPEC: 100 %
CD5 CELLS NFR SPEC: 74 %
CD5/CD19 CELLS: 2 %
CD7 CELLS NFR SPEC: 70 %
CELL SURF KAPPA/LAMBDA RATIO: 1.6
CELL SURF LAMBDA LIGHT CHAIN: 7 %
CELL SURFACE KAPPA LIGHT CHAIN: 11 %
TCR G-D CELLS NFR SPEC: 4 %

## 2025-03-17 NOTE — IMAGING NOTE
"Daily Note     Today's date: 3/18/2025  Patient name: Becky Hong  : 2000  MRN: [id number]  Referring provider: Teddy Valadez DO  Dx:   Encounter Diagnosis     ICD-10-CM    1. Cervicalgia  M54.2                      Subjective: \"I had some pain last week, it was going down into my arm. I do the retractions with extensions and it helps for about an hour or two but then it comes back.\"      Objective: See treatment diary below      Assessment: Educated patient on performing retraction extension before symptoms come on, ideally every 1 to 2 hours. Discussed this may be difficult at work, but definitely perform at home or when she is available to achieve end range more frequently. Will continue to address nerve with central gliders in future visit, and progress scap stability and strengthening as tolerated. Rhythmic stab nv. RTC lift and CKC exercises nv.         Plan: Continue per plan of care.      Precautions: anxiety  [id number]  POC expires Unit limit Auth Expiration date PT/OT + Visit Limit?   3/24/25 BOMN N/a BOMN PCY4                             Manuals 9 - 12/17 10 - 12/30  FOTO: 69 11 -  12 - 2/3 13-3/4 14 - 3/17   STM EW cervical paraspinals, B upper traps KA cervical paraspinals, B upper traps   KA UT KA cervical paraspinals, B upper traps and biceps tendon KA cervical paraspinals, B upper traps and biceps tendon, anterior scalenes   UT / LS stretch  KA R       Cervical traction retraction extension KA 10x        Cervical joint mobs          P-A mob   Thoracic P-A grIII-IV  Thoracic P-A  Gr V Thoracic P-A  Gr V   Central glider   L median n KA central 10x      Cupping         Nerve glides     KA L ulnar and median 20x ea  KA L median n 20x  KA L median and ulnar n 20x    Neuro Re-Ed         No monies  With chin retraction BTB 5\"x20   With chin retraction OTB 5\"x20    Cervical SNAG         Cervical AROM         Cervical retraction      10x with extension and wiggle   JUAN PABLO      " Instructions given to patient's nurse Raudel El to call Dr. Kalyan Borrego regarding need for betablocker as his HR has been between 80-91 (needs to be under 61 for CTA gated coronary study).  Instructed that it is ok to eat breakfast, but hold all caffeine includin "10\"x10    Diaphragmatic breathing         Nerve glides   B median n tensioner 15x    B ulnar n tensioner 15x       FR protocol    1' ea 1' ea     Ball on wall    Red 20 circles ea, ABCx1 B Red 20 circles ea, ABCx1 B     Body blade    Elbow flex at side 20\"x3 B Elbow flex at side, shoulder flex to 90 elbow straight, 20\"x3 B     Rows / LPD  3x10 GTB        Repeated cervical retraction extension          Wall slides  10\"x10 10\"x10 OTB   10\"x10     Prone YTI   nv    Pball 10x ea    Cervical retraction with lift  Supine 2x10x5\"       Ther Ex         UBE Retro 5' Retro standing 6' Retro standing 6' Retro standing 6' Retro standing  6' Retro standing  6'   RTC lift  OTB 2x10 B  nv       Wall walks OTB 10x B nv                                                    Ther Activity         education                  Gait Training                           Modalities                                      "

## 2025-04-01 DIAGNOSIS — I49.3 PVCS (PREMATURE VENTRICULAR CONTRACTIONS): ICD-10-CM

## 2025-04-01 DIAGNOSIS — I10 BENIGN ESSENTIAL HTN: ICD-10-CM

## 2025-04-01 RX ORDER — METOPROLOL SUCCINATE 25 MG/1
TABLET, EXTENDED RELEASE ORAL
Qty: 135 TABLET | Refills: 2 | Status: SHIPPED | OUTPATIENT
Start: 2025-04-01

## 2025-05-16 ENCOUNTER — LAB ENCOUNTER (OUTPATIENT)
Dept: LAB | Age: 60
End: 2025-05-16
Attending: FAMILY MEDICINE
Payer: COMMERCIAL

## 2025-05-16 DIAGNOSIS — R73.03 PREDIABETES: ICD-10-CM

## 2025-05-16 DIAGNOSIS — Z12.5 SCREENING FOR PROSTATE CANCER: ICD-10-CM

## 2025-05-16 DIAGNOSIS — I10 BENIGN ESSENTIAL HTN: ICD-10-CM

## 2025-05-16 DIAGNOSIS — Z00.00 ROUTINE HEALTH MAINTENANCE: ICD-10-CM

## 2025-05-16 DIAGNOSIS — Z00.00 WELLNESS EXAMINATION: ICD-10-CM

## 2025-05-16 DIAGNOSIS — Z13.220 LIPID SCREENING: ICD-10-CM

## 2025-05-16 LAB
ALBUMIN SERPL-MCNC: 4.5 G/DL (ref 3.2–4.8)
ALBUMIN/GLOB SERPL: 1.7 {RATIO} (ref 1–2)
ALP LIVER SERPL-CCNC: 51 U/L (ref 45–117)
ALT SERPL-CCNC: 33 U/L (ref 10–49)
ANION GAP SERPL CALC-SCNC: 9 MMOL/L (ref 0–18)
AST SERPL-CCNC: 26 U/L (ref ?–34)
BASOPHILS # BLD AUTO: 0.03 X10(3) UL (ref 0–0.2)
BASOPHILS NFR BLD AUTO: 0.4 %
BILIRUB SERPL-MCNC: 0.8 MG/DL (ref 0.3–1.2)
BUN BLD-MCNC: 14 MG/DL (ref 9–23)
CALCIUM BLD-MCNC: 9.4 MG/DL (ref 8.7–10.6)
CHLORIDE SERPL-SCNC: 104 MMOL/L (ref 98–112)
CHOLEST SERPL-MCNC: 165 MG/DL (ref ?–200)
CO2 SERPL-SCNC: 26 MMOL/L (ref 21–32)
COMPLEXED PSA SERPL-MCNC: 2.18 NG/ML (ref ?–4)
CREAT BLD-MCNC: 0.87 MG/DL (ref 0.7–1.3)
EGFRCR SERPLBLD CKD-EPI 2021: 100 ML/MIN/1.73M2 (ref 60–?)
EOSINOPHIL # BLD AUTO: 0.08 X10(3) UL (ref 0–0.7)
EOSINOPHIL NFR BLD AUTO: 1.2 %
ERYTHROCYTE [DISTWIDTH] IN BLOOD BY AUTOMATED COUNT: 12.2 %
EST. AVERAGE GLUCOSE BLD GHB EST-MCNC: 117 MG/DL (ref 68–126)
FASTING PATIENT LIPID ANSWER: YES
FASTING STATUS PATIENT QL REPORTED: YES
GLOBULIN PLAS-MCNC: 2.7 G/DL (ref 2–3.5)
GLUCOSE BLD-MCNC: 92 MG/DL (ref 70–99)
HBA1C MFR BLD: 5.7 % (ref ?–5.7)
HCT VFR BLD AUTO: 43.5 % (ref 39–53)
HDLC SERPL-MCNC: 56 MG/DL (ref 40–59)
HGB BLD-MCNC: 14.7 G/DL (ref 13–17.5)
IMM GRANULOCYTES # BLD AUTO: 0.01 X10(3) UL (ref 0–1)
IMM GRANULOCYTES NFR BLD: 0.1 %
LDLC SERPL CALC-MCNC: 91 MG/DL (ref ?–100)
LYMPHOCYTES # BLD AUTO: 2.31 X10(3) UL (ref 1–4)
LYMPHOCYTES NFR BLD AUTO: 33.3 %
MCH RBC QN AUTO: 31.1 PG (ref 26–34)
MCHC RBC AUTO-ENTMCNC: 33.8 G/DL (ref 31–37)
MCV RBC AUTO: 92 FL (ref 80–100)
MONOCYTES # BLD AUTO: 0.72 X10(3) UL (ref 0.1–1)
MONOCYTES NFR BLD AUTO: 10.4 %
NEUTROPHILS # BLD AUTO: 3.79 X10 (3) UL (ref 1.5–7.7)
NEUTROPHILS # BLD AUTO: 3.79 X10(3) UL (ref 1.5–7.7)
NEUTROPHILS NFR BLD AUTO: 54.6 %
NONHDLC SERPL-MCNC: 109 MG/DL (ref ?–130)
OSMOLALITY SERPL CALC.SUM OF ELEC: 288 MOSM/KG (ref 275–295)
PLATELET # BLD AUTO: 187 10(3)UL (ref 150–450)
POTASSIUM SERPL-SCNC: 4.3 MMOL/L (ref 3.5–5.1)
PROT SERPL-MCNC: 7.2 G/DL (ref 5.7–8.2)
RBC # BLD AUTO: 4.73 X10(6)UL (ref 4.3–5.7)
SODIUM SERPL-SCNC: 139 MMOL/L (ref 136–145)
TRIGL SERPL-MCNC: 96 MG/DL (ref 30–149)
VLDLC SERPL CALC-MCNC: 16 MG/DL (ref 0–30)
WBC # BLD AUTO: 6.9 X10(3) UL (ref 4–11)

## 2025-05-16 PROCEDURE — 80053 COMPREHEN METABOLIC PANEL: CPT

## 2025-05-16 PROCEDURE — 80061 LIPID PANEL: CPT

## 2025-05-16 PROCEDURE — 85025 COMPLETE CBC W/AUTO DIFF WBC: CPT

## 2025-05-16 PROCEDURE — 36415 COLL VENOUS BLD VENIPUNCTURE: CPT

## 2025-05-16 PROCEDURE — 83036 HEMOGLOBIN GLYCOSYLATED A1C: CPT

## 2025-05-30 ENCOUNTER — OFFICE VISIT (OUTPATIENT)
Dept: FAMILY MEDICINE CLINIC | Facility: CLINIC | Age: 60
End: 2025-05-30
Payer: COMMERCIAL

## 2025-05-30 VITALS
OXYGEN SATURATION: 97 % | HEART RATE: 70 BPM | DIASTOLIC BLOOD PRESSURE: 76 MMHG | WEIGHT: 280 LBS | SYSTOLIC BLOOD PRESSURE: 122 MMHG | BODY MASS INDEX: 37.93 KG/M2 | RESPIRATION RATE: 16 BRPM | HEIGHT: 72 IN

## 2025-05-30 DIAGNOSIS — I10 BENIGN ESSENTIAL HTN: ICD-10-CM

## 2025-05-30 DIAGNOSIS — G47.33 OSA (OBSTRUCTIVE SLEEP APNEA): ICD-10-CM

## 2025-05-30 DIAGNOSIS — F32.A DEPRESSION, UNSPECIFIED DEPRESSION TYPE: ICD-10-CM

## 2025-05-30 DIAGNOSIS — B35.6 JOCK ITCH: ICD-10-CM

## 2025-05-30 DIAGNOSIS — R59.1 LYMPHADENOPATHY: ICD-10-CM

## 2025-05-30 DIAGNOSIS — G47.00 INSOMNIA, UNSPECIFIED TYPE: ICD-10-CM

## 2025-05-30 DIAGNOSIS — G44.86 CERVICOGENIC HEADACHE: ICD-10-CM

## 2025-05-30 DIAGNOSIS — G43.809 VESTIBULAR MIGRAINE: ICD-10-CM

## 2025-05-30 DIAGNOSIS — Z00.00 WELLNESS EXAMINATION: Primary | ICD-10-CM

## 2025-05-30 DIAGNOSIS — E66.812 CLASS 2 SEVERE OBESITY WITH SERIOUS COMORBIDITY AND BODY MASS INDEX (BMI) OF 37.0 TO 37.9 IN ADULT, UNSPECIFIED OBESITY TYPE (HCC): ICD-10-CM

## 2025-05-30 DIAGNOSIS — E66.01 CLASS 2 SEVERE OBESITY WITH SERIOUS COMORBIDITY AND BODY MASS INDEX (BMI) OF 37.0 TO 37.9 IN ADULT, UNSPECIFIED OBESITY TYPE (HCC): ICD-10-CM

## 2025-05-30 PROCEDURE — 90471 IMMUNIZATION ADMIN: CPT | Performed by: FAMILY MEDICINE

## 2025-05-30 PROCEDURE — 90715 TDAP VACCINE 7 YRS/> IM: CPT | Performed by: FAMILY MEDICINE

## 2025-05-30 PROCEDURE — 99396 PREV VISIT EST AGE 40-64: CPT | Performed by: FAMILY MEDICINE

## 2025-05-30 RX ORDER — TIRZEPATIDE 2.5 MG/.5ML
2.5 INJECTION, SOLUTION SUBCUTANEOUS WEEKLY
Qty: 2 ML | Refills: 0 | Status: SHIPPED | OUTPATIENT
Start: 2025-05-30 | End: 2025-06-21

## 2025-05-30 RX ORDER — DOXYCYCLINE HYCLATE 100 MG
100 TABLET ORAL 2 TIMES DAILY
Qty: 20 TABLET | Refills: 0 | Status: SHIPPED | OUTPATIENT
Start: 2025-05-30

## 2025-05-30 RX ORDER — TRAZODONE HYDROCHLORIDE 50 MG/1
TABLET ORAL NIGHTLY PRN
Qty: 60 TABLET | Refills: 1 | Status: SHIPPED | OUTPATIENT
Start: 2025-05-30

## 2025-05-30 RX ORDER — KETOROLAC TROMETHAMINE 10 MG/1
10 TABLET, FILM COATED ORAL EVERY 6 HOURS PRN
Qty: 16 TABLET | Refills: 1 | Status: SHIPPED | OUTPATIENT
Start: 2025-05-30

## 2025-05-30 RX ORDER — FLUCONAZOLE 150 MG/1
150 TABLET ORAL WEEKLY
Qty: 3 TABLET | Refills: 0 | Status: SHIPPED | OUTPATIENT
Start: 2025-05-30

## 2025-05-30 NOTE — PROGRESS NOTES
HPI:   Donato Baca is a 60 year old male who presents for an Annual Health Visit.     Hre for wellness visit.    Continues to have swollen lymph nodes,    Ongoing, negative biopsy    Jock itch will come and go. Has used creams and powders and will relieve it and then returns.    Migraines have been better, start ocular, then strikes    Depression is up, has zero interest in activities Has had trouble sleeping and had blamed on that. Has had side effects on mdications, had severe nightmares, or impacted his sleep, had tried celexa, paxil, and effexor.    No significant anxiety, no trouble falling asleep, but struggles to stay asleep    No thoughts about self harm    Allergies:     Allergies   Allergen Reactions    Bactrim Ds HIVES    Sulfamethoxazole UNKNOWN     Cerner Allergy Text Annotation: sulfamethoxazole    Gabapentin RASH and OTHER (SEE COMMENTS)     Tingle        CURRENT MEDICATIONS   Current Outpatient Medications   Medication Sig Dispense Refill    Ketorolac Tromethamine 10 MG Oral Tab Take 1 tablet (10 mg total) by mouth every 6 (six) hours as needed for Pain. 16 tablet 1    fluconazole (DIFLUCAN) 150 MG Oral Tab Take 1 tablet (150 mg total) by mouth once a week. 3 tablet 0    Tirzepatide-Weight Management (ZEPBOUND) 2.5 MG/0.5ML Subcutaneous Solution Auto-injector Inject 2.5 mg into the skin once a week for 4 doses. 2 mL 0    traZODone 50 MG Oral Tab Take 1-2 tablets ( mg total) by mouth nightly as needed for Sleep. 60 tablet 1    Doxycycline Hyclate 100 MG Oral Tab Take 1 tablet (100 mg total) by mouth 2 (two) times daily. 20 tablet 0    metoprolol succinate ER 25 MG Oral Tablet 24 Hr TAKE 1 TABLET BY MOUTH EVERY MORNING AND 1/2 TABLET EVERY EVENING 135 tablet 2    DILTIAZEM HCL ER COATED BEADS 180 MG Oral Capsule SR 24 Hr TAKE 1 CAPSULE BY MOUTH EVERY DAY 90 capsule 1    SUMATRIPTAN 50 MG Oral Tab TAKE 1 TAB AT START OF HEADACHE, MAY REPEAT 1 MORE TAB IN 2 HOURS, NO MORE THAN 2 IN 24 HOURS  OR 3 IN 1 WEEK 9 tablet 1    Triamcinolone Acetonide (NASACORT AQ NA) by Nasal route.      Omeprazole Magnesium (PRILOSEC OR) Take 20 mg by mouth.      Omega-3 Fatty Acids (FISH OIL CONCENTRATE OR) Take 1 capsule by mouth every evening.      Lactobacillus (PROBIOTIC ACIDOPHILUS OR) Take 1 tablet by mouth daily with lunch.      Multiple Vitamins-Minerals (MULTI-VITAMIN/MINERALS) Oral Tab Take 1 tablet by mouth daily with lunch.      magnesium oxide 400 MG Oral Tab Take 1 tablet (400 mg total) by mouth every evening. 30 tablet 0      HISTORICAL INFORMATION   Past Medical History:    Abdominal pain    Occasional    Acute bilateral low back pain with bilateral sciatica    Axillary mass, bilateral    Back pain    Back problem    Bloating    Occasional    Calculus of kidney    Constipation    Occasional    Depression    Diarrhea, unspecified    Occasional    Dizziness    In conjunction with PVCs    Enlarged lymph node    Currently under care    Epididymitis    Essential hypertension    Fatigue    Daily    Flatulence/gas pain/belching    Occasional    Headache disorder    Couple times a week    Heart palpitations    Idiopathic diagnosis    Heartburn    Occasional    Hemorrhoids    High blood pressure    Indigestion    Occasioal    Irregular bowel habits    Kidney stones    Loss of appetite    Occasional    Mass of left thigh    Migraines    Nausea    Occasional    Pain in joints    Pain with bowel movements    Occasional    Pneumonia due to organism    Sleep apnea    Possible- sleep study scheduled 4/19/19    Sleep disturbance    Daily    Stress    Visual impairment    Wears glasses    Since late teens      Past Surgical History:   Procedure Laterality Date    Colonoscopy      Colonoscopy      Sinus surgery          Family History   Problem Relation Age of Onset    Cancer Father 76        melanoma    Depression Father     Cancer Mother 62        lymphoma    Heart Disorder Mother     Depression Mother     Asthma Mother      Asthma Sister     Kidney Disease Maternal Grandmother     Other (Fernanda) Paternal Grandfather       SOCIAL HISTORY   Social History     Socioeconomic History    Marital status:    Tobacco Use    Smoking status: Never    Smokeless tobacco: Never   Vaping Use    Vaping status: Never Used   Substance and Sexual Activity    Alcohol use: Yes     Comment: Rarely    Drug use: No   Other Topics Concern    Caffeine Concern Yes     Comment: 2 cups decaf daily    Exercise Yes     Comment: walks- daily    Seat Belt Yes     Social Drivers of Health      Received from [facility]    Housing Stability     Social History     Social History Narrative    Not on file        REVIEW OF SYSTEMS:     Constitutional: negative  Eyes: negative  ENT: negative  Respiratory: negative  Cardiovascular: negative  Gastrointestinal: negative  Integument/Breast: negative  Genitourinary: negative  Heme/Lymph: negative  Musculoskeletal: negative  Neurological: negative  Psych: negative  Endocrine: negative  Allergic/Immune: negative    EXAM:   /76   Pulse 70   Resp 16   Ht 6' (1.829 m)   Wt 280 lb (127 kg)   SpO2 97%   BMI 37.97 kg/m²    Wt Readings from Last 6 Encounters:   05/30/25 280 lb (127 kg)   10/23/24 278 lb (126.1 kg)   09/06/24 272 lb (123.4 kg)   08/01/24 273 lb (123.8 kg)   07/25/24 273 lb (123.8 kg)   07/16/24 273 lb 3.2 oz (123.9 kg)     Body mass index is 37.97 kg/m².    General: alert, appears stated age, and cooperative  Head: Normocephalic, without obvious abnormality, atraumatic  Eyes: conjunctivae/corneas clear. PERRL, EOM's intact. Fundi benign.  Ears: normal TM's and external ear canals both ears  Nose: Nares normal. Septum midline. Mucosa normal. No drainage or sinus tenderness.  Throat: lips, mucosa, and tongue normal; teeth and gums normal  Neck: no adenopathy, no carotid bruit, no JVD, supple, symmetrical, trachea midline, and thyroid not enlarged, symmetric, no  tenderness/mass/nodules  Heart: S1, S2 normal, no murmur, click, rub or gallop, regular rate and rhythm  Lungs: clear to auscultation bilaterally  Chest wall: no tenderness  Abdomen: soft, non-tender; bowel sounds normal; no masses,  no organomegaly  : deferred  Back: symmetric, no curvature. ROM normal. No CVA tenderness.  Extremities: extremities normal, atraumatic, no cyanosis or edema  Pulses: 2+ and symmetric  Skin: Skin color, texture, turgor normal. No rashes or lesions  Lymph Nodes: Cervical, supraclavicular, and axillary nodes normal.  Neurologic: Grossly normal    ASSESSMENT AND PLAN:   Diagnoses and all orders for this visit:    Wellness examination    Jock itch  -     fluconazole (DIFLUCAN) 150 MG Oral Tab; Take 1 tablet (150 mg total) by mouth once a week.    Cervicogenic headache  -     Ketorolac Tromethamine 10 MG Oral Tab; Take 1 tablet (10 mg total) by mouth every 6 (six) hours as needed for Pain.    Vestibular migraine  -     Ketorolac Tromethamine 10 MG Oral Tab; Take 1 tablet (10 mg total) by mouth every 6 (six) hours as needed for Pain.    Class 2 severe obesity with serious comorbidity and body mass index (BMI) of 37.0 to 37.9 in adult, unspecified obesity type (HCC)  -     Tirzepatide-Weight Management (ZEPBOUND) 2.5 MG/0.5ML Subcutaneous Solution Auto-injector; Inject 2.5 mg into the skin once a week for 4 doses.    Benign essential HTN  -     Tirzepatide-Weight Management (ZEPBOUND) 2.5 MG/0.5ML Subcutaneous Solution Auto-injector; Inject 2.5 mg into the skin once a week for 4 doses.    ADAM (obstructive sleep apnea)  -     Tirzepatide-Weight Management (ZEPBOUND) 2.5 MG/0.5ML Subcutaneous Solution Auto-injector; Inject 2.5 mg into the skin once a week for 4 doses.    Insomnia, unspecified type  -     traZODone 50 MG Oral Tab; Take 1-2 tablets ( mg total) by mouth nightly as needed for Sleep.    Depression, unspecified depression type    Lymphadenopathy  -     Doxycycline Hyclate 100  MG Oral Tab; Take 1 tablet (100 mg total) by mouth 2 (two) times daily.  -     CBC W Differential W Platelet [E]; Future  -     Comp Metabolic Panel (14) [E]; Future    Other orders  -     TdaP (Adacel, Boostrix) [10128]    Did warn we don't do prior authorization    Will get screening labs, trial abx course for lymphadenopathy    Will treat fungal infection  There are no Patient Instructions on file for this visit.    The patient indicates understanding of these issues and agrees to the plan.    Problem List:  Patient Active Problem List   Diagnosis    Allergic rhinitis    ADAM (obstructive sleep apnea)    Fatty liver    Benign essential HTN    Obesity (BMI 35.0-39.9 without comorbidity)    Asbestos exposure    Chest pain, atypical    Hyperglycemia    Palpitations    Snoring    GERD (gastroesophageal reflux disease)    PVC (premature ventricular contraction)    Nonintractable headache, unspecified chronicity pattern, unspecified headache type    History of adenomatous polyp of colon    Diaphoresis    Ascending aorta dilatation       Avi Pratt MD  5/30/2025  7:37 AM

## 2025-07-03 DIAGNOSIS — I10 BENIGN ESSENTIAL HTN: ICD-10-CM

## 2025-07-03 DIAGNOSIS — I49.3 PVCS (PREMATURE VENTRICULAR CONTRACTIONS): ICD-10-CM

## 2025-07-03 RX ORDER — DILTIAZEM HYDROCHLORIDE 180 MG/1
180 CAPSULE, COATED, EXTENDED RELEASE ORAL DAILY
Qty: 90 CAPSULE | Refills: 1 | Status: SHIPPED | OUTPATIENT
Start: 2025-07-03

## 2025-07-29 DIAGNOSIS — G47.00 INSOMNIA, UNSPECIFIED TYPE: ICD-10-CM

## 2025-07-29 RX ORDER — TRAZODONE HYDROCHLORIDE 50 MG/1
TABLET ORAL NIGHTLY PRN
Qty: 60 TABLET | Refills: 1 | Status: SHIPPED | OUTPATIENT
Start: 2025-07-29

## (undated) DIAGNOSIS — Z00.00 ROUTINE HEALTH MAINTENANCE: Primary | ICD-10-CM

## (undated) NOTE — Clinical Note
I had the pleasure of seeing Fly Miguelina Mcghee in my office today. Please see my attached note.     Jessica Gamble

## (undated) NOTE — LETTER
AUTHORIZATION FOR SURGICAL OPERATION OR OTHER PROCEDURE    1. I hereby authorize Dr. Edith Lay, and [facility] staff assigned to my case to perform the following operation and/or procedure at the [facility]:    _______________________________________________________________________________________________    Nail Avulsion Right Hallux  _______________________________________________________________________________________________    2. My physician has explained the nature and purpose of the operation or other procedure, possible alternative methods of treatment, the risks involved, and the possibility of complication to me. I acknowledge that no guarantee has been made as to the result that may be obtained. 3.  I recognize that, during the course of this operation, or other procedure, unforseen conditions may necessitate additional or different procedure than those listed above. I, therefore, further authorize and request that the above named physician, his/her physician assistants or designees perform such procedures as are, in his/her professional opinion, necessary and desirable. 4.  Any tissue or organs removed in the operation or other procedure may be disposed of by and at the discretion of the [facility], [facility] and [facility] AT [facility]. 5.  I understand that in the event of a medical emergency, I will be transported by local paramedics to [facility] or other hospital emergency department. 6.  I certify that I have read and fully understand the above consent to operation and/or other procedure. 7.  I acknowledge that my physician has explained sedation/analgesia administration to me including the risks and benefits. I consent to the administration of sedation/analgesia as may be necessary or desirable in the judgement of my physician.     Witness signature: ___________________________________________________ Date:  ______/______/_____                    Time: ________ A. M.  P.M. Patient Name:  ______________________________________________________  (please print)      Patient signature:  ___________________________________________________             Relationship to Patient:           []  Parent    Responsible person                          []  Spouse  In case of minor or                    [] Other  _____________   Incompetent name:  __________________________________________________                               (please print)      _____________      Responsible person  In case of minor or  Incompetent signature:  _______________________________________________    Statement of Physician  My signature below affirms that prior to the time of the procedure, I have explained to the patient and/or his/her guardian, the risks and benefits involved in the proposed treatment and any reasonable alternative to the proposed treatment. I have also explained the risks and benefits involved in the refusal of the proposed treatment and have answered the patient's questions.                         Date:  ______/______/_______  Provider                      Signature:  __________________________________________________________       Time:  ___________ A.M    P.M.

## (undated) NOTE — MR AVS SNAPSHOT
[address] [facility] [id number] [phone]               Thank you for choosing us for your health care visit with Marielle Barnard DO.   We are glad to serve you and happy to provide you with this sum Albuterol Sulfate  (90 BASE) MCG/ACT Aers   Inhale 2 puffs into the lungs every 6 (six) hours as needed for Wheezing. Commonly known as:  PROAIR HFA           AmLODIPine Besylate 5 MG Tabs   Take 5 mg by mouth once daily.    Commonly known as:

## (undated) NOTE — ED AVS SNAPSHOT
Jacek Ross   MRN: [id number]    Department:  [facility] Emergency Department   Date of Visit:  9/23/2018           Disclosure     Insurance plans vary and the physician(s) referred by the ER may not be covered by your plan.  Please contact you tell this physician (or your personal doctor if your instructions are to return to your personal doctor) about any new or lasting problems. The primary care or specialist physician will see patients referred from the [facility] Emergency Department.  Bernardo Diaz

## (undated) NOTE — MR AVS SNAPSHOT
[address] [address] [phone]               Thank you for choosing us for your health care visit with Sherryle Curb, DO.   We are glad to serve you and happy to provide you with this sum schedule your appointment. Failure to obtain required authorization numbers can create reimbursement difficulties for you.     Assoc Dx:  Essential hypertension [I10], Obesity (BMI 35.0-39.9 without comorbidity) [E66.9], Routine health maintenance [Z00.00] Where to Get Your Medications      These medications were sent to [facility] [address] [facility], [address] [facility] [phone], [address], [facility] [address]     Phone:  [phone]    - lisinopril 40 MG Tabs

## (undated) NOTE — ED AVS SNAPSHOT
Ezequiel Jeffries   MRN: [id number]    Department:  [facility] Emergency Department   Date of Visit:  1/14/2019           Disclosure     Insurance plans vary and the physician(s) referred by the ER may not be covered by your plan.  Please contact you tell this physician (or your personal doctor if your instructions are to return to your personal doctor) about any new or lasting problems. The primary care or specialist physician will see patients referred from the [facility] Emergency Department.  Anatoliy Chavis

## (undated) NOTE — ED AVS SNAPSHOT
Chele Jade   MRN: [id number]    Department:  [facility] Emergency Department   Date of Visit:  10/24/2018           Disclosure     Insurance plans vary and the physician(s) referred by the ER may not be covered by your plan.  Please contact yo tell this physician (or your personal doctor if your instructions are to return to your personal doctor) about any new or lasting problems. The primary care or specialist physician will see patients referred from the [facility] Emergency Department.  Bernardo Diaz

## (undated) NOTE — ED AVS SNAPSHOT
Tariq Naylor   MRN: [id number]    Department:  [facility] Emergency Department   Date of Visit:  2/11/2018           Disclosure     Insurance plans vary and the physician(s) referred by the ER may not be covered by your plan.  Please contact you tell this physician (or your personal doctor if your instructions are to return to your personal doctor) about any new or lasting problems. The primary care or specialist physician will see patients referred from the [facility] Emergency Department.  Pretty Ferrera

## (undated) NOTE — LETTER
[address] [place] [address]   Date:   10/7/2022     Name:   Karl Tejeda    YOB: 1965   MRN:   [id number]       [facility]? Nick the areas on your body where you feel the described sensations. Use the appropriate symbol. Gamaliel Vo the areas of radiation. Include all affected areas. Just to complete the picture, please draw in the face. ACHE:  ^ ^ ^   NUMBNESS:  0000   PINS & NEEDLES:  = = = =                              ^ ^ ^                       0000              = = = =                                    ^ ^ ^                       0000            = = = =      BURNING:  XXXX   STABBING: ////                  XXXX                ////                         XXXX          ////     Please nick the line below indicating your degree of pain right now  with 0 being no pain 10 being the worst pain possible.                                          0             1             2              3             4              5              6              7             8             9             10         Patient Signature:

## (undated) NOTE — ED AVS SNAPSHOT
Flip Susana   MRN: [id number]    Department:  [facility] Emergency Department   Date of Visit:  5/5/2019           Disclosure     Insurance plans vary and the physician(s) referred by the ER may not be covered by your plan.  Please contact your tell this physician (or your personal doctor if your instructions are to return to your personal doctor) about any new or lasting problems. The primary care or specialist physician will see patients referred from the [facility] Emergency Department.  Wing Olivarez

## (undated) NOTE — MR AVS SNAPSHOT
[address] [facility] [address] [phone]               Thank you for choosing us for your health care visit with Hermelindo Mcgee DO.   We are glad to serve you and happy to provide you with this sum MyChart     Visit [employer]  You can access your MyChart to more actively manage your health care and view more details from this visit by going to [email or web address].   If you've recently had a stay at the Hospital you can access yo Don’t forget strength training with weights and resistance Set goals and track your progress   You don’t need to join a gym. Home exercises work great.  Put more priority on exercise in your life                    Visit [facility] online at

## (undated) NOTE — Clinical Note
Pt had an appt at [facility] but cancelled the appt and is set to see you on 7/2/21. Pt reported he was feeling better since discharge. Medication list reviewed. Reviewed all recommended HFU appt's with the pt. Thank you.

## (undated) NOTE — LETTER
Date: 11/17/2021    Patient Name: Laura Fraga          To Whom it may concern: This letter has been written at the patient's request. The above patient was seen at the [facility] for treatment of a medical condition.     This patient sh

## (undated) NOTE — LETTER
AUTHORIZATION FOR SURGICAL OPERATION OR OTHER PROCEDURE    1. I hereby authorize Dr. Peter dailey , and [facility] staff assigned to my case to perform the following operation and/or procedure at the [facility] site:    _______________________________________________________________________________________________    Nail Avulsion right hallux   _______________________________________________________________________________________________    2.  My physician has explained the nature and purpose of the operation or other procedure, possible alternative methods of treatment, the risks involved, and the possibility of complication to me.  I acknowledge that no guarantee has been made as to the result that may be obtained.  3.  I recognize that, during the course of this operation, or other procedure, unforseen conditions may necessitate additional or different procedure than those listed above.  I, therefore, further authorize and request that the above named physician, his/her physician assistants or designees perform such procedures as are, in his/her professional opinion, necessary and desirable.  4.  Any tissue or organs removed in the operation or other procedure may be disposed of by and at the discretion of the [facility] and [facility].  5.  I understand that in the event of a medical emergency, I will be transported by local paramedics to [facility] or other hospital emergency department.  6.  I certify that I have read and fully understand the above consent to operation and/or other procedure.    7.  I acknowledge that my physician has explained sedation/analgesia administration to me including the risks and benefits.  I consent to the administration of sedation/analgesia as may be necessary or desirable in the judgement of my physician.    Witness signature: ___________________________________________________ Date:  ______/______/_____                     Time:  ________ A.M.  P.M.       Patient Name:  ______________________________________________________  (please print)      Patient signature:  ___________________________________________________             Relationship to Patient:           []  Parent    Responsible person                          []  Spouse  In case of minor or                    [] Other  _____________   Incompetent name:  __________________________________________________                               (please print)      _____________      Responsible person  In case of minor or  Incompetent signature:  _______________________________________________    Statement of Physician  My signature below affirms that prior to the time of the procedure, I have explained to the patient and/or his/her guardian, the risks and benefits involved in the proposed treatment and any reasonable alternative to the proposed treatment.  I have also explained the risks and benefits involved in the refusal of the proposed treatment and have answered the patient's questions.                        Date:  ______/______/_______  Provider                      Signature:  __________________________________________________________       Time:  ___________ A.M    P.M.

## (undated) NOTE — LETTER
February 11, 2018    Patient: Anastasia Hernandez   Date of Visit: 2/11/2018       To Whom It May Concern:    Meli Regina was seen and treated in our emergency department on 2/11/2018. He should not return to work until Wednesday, February 14.     If you